# Patient Record
Sex: FEMALE | Race: WHITE | NOT HISPANIC OR LATINO | Employment: OTHER | URBAN - METROPOLITAN AREA
[De-identification: names, ages, dates, MRNs, and addresses within clinical notes are randomized per-mention and may not be internally consistent; named-entity substitution may affect disease eponyms.]

---

## 2017-01-30 RX ORDER — FENOFIBRATE 130 MG/1
CAPSULE ORAL
Qty: 90 CAPSULE | Refills: 1 | Status: SHIPPED | OUTPATIENT
Start: 2017-01-30 | End: 2017-04-12 | Stop reason: SDUPTHER

## 2017-01-30 RX ORDER — VILAZODONE HYDROCHLORIDE 40 MG/1
TABLET ORAL
Qty: 90 TABLET | Refills: 1 | Status: SHIPPED | OUTPATIENT
Start: 2017-01-30 | End: 2017-04-12 | Stop reason: SDUPTHER

## 2017-01-30 RX ORDER — LEVOTHYROXINE SODIUM 25 MCG
TABLET ORAL
Qty: 90 TABLET | Refills: 1 | Status: SHIPPED | OUTPATIENT
Start: 2017-01-30 | End: 2017-04-12 | Stop reason: SDUPTHER

## 2017-01-30 RX ORDER — ESOMEPRAZOLE MAGNESIUM 40 MG/1
CAPSULE, DELAYED RELEASE ORAL
Qty: 90 CAPSULE | Refills: 1 | Status: SHIPPED | OUTPATIENT
Start: 2017-01-30 | End: 2017-04-12 | Stop reason: SDUPTHER

## 2017-01-30 RX ORDER — ROSUVASTATIN CALCIUM 20 MG/1
TABLET, COATED ORAL
Qty: 90 TABLET | Refills: 1 | Status: SHIPPED | OUTPATIENT
Start: 2017-01-30 | End: 2017-04-12 | Stop reason: SDUPTHER

## 2017-02-20 ENCOUNTER — RESULTS ENCOUNTER (OUTPATIENT)
Dept: INTERNAL MEDICINE | Facility: CLINIC | Age: 67
End: 2017-02-20

## 2017-02-20 DIAGNOSIS — E78.5 HYPERLIPIDEMIA: ICD-10-CM

## 2017-02-20 DIAGNOSIS — I10 ESSENTIAL HYPERTENSION: ICD-10-CM

## 2017-02-21 LAB
ALBUMIN SERPL-MCNC: 5 G/DL (ref 3.5–5.2)
ALBUMIN/GLOB SERPL: 2.5 G/DL
ALP SERPL-CCNC: 57 U/L (ref 39–117)
ALT SERPL-CCNC: 36 U/L (ref 1–33)
AST SERPL-CCNC: 29 U/L (ref 1–32)
BILIRUB SERPL-MCNC: 0.2 MG/DL (ref 0.1–1.2)
BUN SERPL-MCNC: 22 MG/DL (ref 8–23)
BUN/CREAT SERPL: 18.5 (ref 7–25)
CALCIUM SERPL-MCNC: 9.5 MG/DL (ref 8.6–10.5)
CHLORIDE SERPL-SCNC: 105 MMOL/L (ref 98–107)
CHOLEST SERPL-MCNC: 232 MG/DL (ref 0–200)
CO2 SERPL-SCNC: 21.9 MMOL/L (ref 22–29)
CREAT SERPL-MCNC: 1.19 MG/DL (ref 0.57–1)
GLOBULIN SER CALC-MCNC: 2 GM/DL
GLUCOSE SERPL-MCNC: 157 MG/DL (ref 65–99)
HDLC SERPL-MCNC: 40 MG/DL (ref 40–60)
LDLC SERPL CALC-MCNC: 129 MG/DL (ref 0–100)
LDLC/HDLC SERPL: 3.23 {RATIO}
POTASSIUM SERPL-SCNC: 4.8 MMOL/L (ref 3.5–5.2)
PROT SERPL-MCNC: 7 G/DL (ref 6–8.5)
SODIUM SERPL-SCNC: 144 MMOL/L (ref 136–145)
TRIGL SERPL-MCNC: 315 MG/DL (ref 0–150)
TSH SERPL DL<=0.005 MIU/L-ACNC: 3.27 MIU/ML (ref 0.27–4.2)
VLDLC SERPL CALC-MCNC: 63 MG/DL (ref 5–40)

## 2017-02-23 ENCOUNTER — OFFICE VISIT (OUTPATIENT)
Dept: INTERNAL MEDICINE | Facility: CLINIC | Age: 67
End: 2017-02-23

## 2017-02-23 VITALS
DIASTOLIC BLOOD PRESSURE: 88 MMHG | SYSTOLIC BLOOD PRESSURE: 128 MMHG | WEIGHT: 207.5 LBS | BODY MASS INDEX: 34.57 KG/M2 | OXYGEN SATURATION: 96 % | HEART RATE: 73 BPM | HEIGHT: 65 IN

## 2017-02-23 DIAGNOSIS — M54.5 LOW BACK PAIN, UNSPECIFIED BACK PAIN LATERALITY, UNSPECIFIED CHRONICITY, WITH SCIATICA PRESENCE UNSPECIFIED: ICD-10-CM

## 2017-02-23 DIAGNOSIS — I10 ESSENTIAL HYPERTENSION: ICD-10-CM

## 2017-02-23 DIAGNOSIS — E03.9 ACQUIRED HYPOTHYROIDISM: Primary | ICD-10-CM

## 2017-02-23 DIAGNOSIS — E78.5 HYPERLIPIDEMIA, UNSPECIFIED HYPERLIPIDEMIA TYPE: ICD-10-CM

## 2017-02-23 LAB
BILIRUB BLD-MCNC: NEGATIVE MG/DL
CLARITY, POC: CLEAR
COLOR UR: YELLOW
GLUCOSE UR STRIP-MCNC: NEGATIVE MG/DL
KETONES UR QL: NEGATIVE
LEUKOCYTE EST, POC: NEGATIVE
NITRITE UR-MCNC: NEGATIVE MG/ML
PH UR: 5 [PH] (ref 5–8)
PROT UR STRIP-MCNC: NEGATIVE MG/DL
RBC # UR STRIP: ABNORMAL /UL
SP GR UR: 1.03 (ref 1–1.03)
UROBILINOGEN UR QL: NORMAL

## 2017-02-23 PROCEDURE — 99214 OFFICE O/P EST MOD 30 MIN: CPT | Performed by: INTERNAL MEDICINE

## 2017-02-23 PROCEDURE — 81003 URINALYSIS AUTO W/O SCOPE: CPT | Performed by: INTERNAL MEDICINE

## 2017-02-23 RX ORDER — ESTRADIOL 10 UG/1
1 TABLET VAGINAL 2 TIMES WEEKLY
Refills: 12 | COMMUNITY
Start: 2016-12-23 | End: 2017-04-12 | Stop reason: SDUPTHER

## 2017-02-23 RX ORDER — CLINDAMYCIN PHOSPHATE 10 UG/ML
1 LOTION TOPICAL 2 TIMES DAILY
Refills: 1 | COMMUNITY
Start: 2016-12-23 | End: 2017-04-12 | Stop reason: SDUPTHER

## 2017-02-23 RX ORDER — MELOXICAM 7.5 MG/1
7.5 TABLET ORAL DAILY
Qty: 30 TABLET | Refills: 2 | Status: SHIPPED | OUTPATIENT
Start: 2017-02-23 | End: 2017-10-19 | Stop reason: SDUPTHER

## 2017-02-23 RX ORDER — LISINOPRIL 10 MG/1
10 TABLET ORAL DAILY
Qty: 30 TABLET | Refills: 2 | Status: SHIPPED | OUTPATIENT
Start: 2017-02-23 | End: 2017-03-10 | Stop reason: SDUPTHER

## 2017-02-23 NOTE — PROGRESS NOTES
Subjective   Dian Jefferson is a 67 y.o. female here today to f/u on HTN, hyperlipidemia and hypothyroidism.  Pt c/o low back pain.     History of Present Illness   Pt has been doing well with thyroid meds.  Taking as perscribed without any complications  Pt has been taking BP meds as prescribed without any problems.  No HA  No episodes of orthostasis.  She needs to change meds due to cost  She has not been on anything else  Pt has been taking cholesterol meds as prescribed.  No difficulties with myalgias.   She has been LBP. No radicular sx.  On the right side.  Better lying down.  Aleve gave some relief  No dysuria or hematuria    The following portions of the patient's history were reviewed and updated as appropriate: allergies, current medications, past medical history, past social history and problem list.  No sig change in diet  She tries to eat a low fat diet and she does not exercise reg    Review of Systems   Musculoskeletal: Positive for back pain.   All other systems reviewed and are negative.      Objective   Physical Exam   Constitutional: She is oriented to person, place, and time. She appears well-developed and well-nourished.   HENT:   Head: Normocephalic and atraumatic.   Right Ear: External ear normal.   Left Ear: External ear normal.   Mouth/Throat: Oropharynx is clear and moist.   Eyes: Conjunctivae and EOM are normal. Pupils are equal, round, and reactive to light.   Neck: Normal range of motion. No tracheal deviation present. No thyromegaly present.   Cardiovascular: Normal rate, regular rhythm, normal heart sounds and intact distal pulses.    Pulmonary/Chest: Effort normal and breath sounds normal.   Abdominal: Soft. Bowel sounds are normal. She exhibits no distension. There is no tenderness.   Musculoskeletal: Normal range of motion. She exhibits no edema or deformity.   Neurological: She is alert and oriented to person, place, and time.   Skin: Skin is warm and dry.   Psychiatric: She  has a normal mood and affect. Her behavior is normal. Judgment and thought content normal.   Vitals reviewed.      Vitals:    02/23/17 1002   BP: 128/88   Pulse: 73   SpO2: 96%        Results Encounter on 02/20/2017   Component Date Value Ref Range Status   • Glucose 02/21/2017 157* 65 - 99 mg/dL Final   • BUN 02/21/2017 22  8 - 23 mg/dL Final   • Creatinine 02/21/2017 1.19* 0.57 - 1.00 mg/dL Final   • eGFR Non  Am 02/21/2017 46* >60 mL/min/1.73 Final   • eGFR African Am 02/21/2017 55* >60 mL/min/1.73 Final   • BUN/Creatinine Ratio 02/21/2017 18.5  7.0 - 25.0 Final   • Sodium 02/21/2017 144  136 - 145 mmol/L Final   • Potassium 02/21/2017 4.8  3.5 - 5.2 mmol/L Final   • Chloride 02/21/2017 105  98 - 107 mmol/L Final   • Total CO2 02/21/2017 21.9* 22.0 - 29.0 mmol/L Final   • Calcium 02/21/2017 9.5  8.6 - 10.5 mg/dL Final   • Total Protein 02/21/2017 7.0  6.0 - 8.5 g/dL Final   • Albumin 02/21/2017 5.00  3.50 - 5.20 g/dL Final   • Globulin 02/21/2017 2.0  gm/dL Final   • A/G Ratio 02/21/2017 2.5  g/dL Final   • Total Bilirubin 02/21/2017 0.2  0.1 - 1.2 mg/dL Final   • Alkaline Phosphatase 02/21/2017 57  39 - 117 U/L Final   • AST (SGOT) 02/21/2017 29  1 - 32 U/L Final   • ALT (SGPT) 02/21/2017 36* 1 - 33 U/L Final   • Total Cholesterol 02/21/2017 232* 0 - 200 mg/dL Final   • Triglycerides 02/21/2017 315* 0 - 150 mg/dL Final   • HDL Cholesterol 02/21/2017 40  40 - 60 mg/dL Final   • VLDL Cholesterol 02/21/2017 63* 5 - 40 mg/dL Final   • LDL Cholesterol  02/21/2017 129* 0 - 100 mg/dL Final   • LDL/HDL Ratio 02/21/2017 3.23   Final   • TSH 02/21/2017 3.27  0.27 - 4.2 mIU/mL Final       Current Outpatient Prescriptions:   •  aspirin 81 MG tablet, Take 1 tablet by mouth daily., Disp: , Rfl:   •  cetirizine (ZyrTEC) 10 MG tablet, Take 1 tablet by mouth daily., Disp: , Rfl:   •  Cholecalciferol (VITAMIN D3) 2000 UNITS capsule, Take 1 capsule by mouth daily., Disp: , Rfl:   •  clindamycin (CLEOCIN T) 1 % lotion, Apply  1 application topically 2 (Two) Times a Day., Disp: , Rfl: 1  •  esomeprazole (nexIUM) 40 MG capsule, Take 1 capsule by mouth  daily, Disp: 90 capsule, Rfl: 1  •  fenofibrate micronized (ANTARA) 130 MG capsule, Take 1 capsule by mouth  daily, Disp: 90 capsule, Rfl: 1  •  nebivolol (BYSTOLIC) 10 MG tablet, Take 1 tablet by mouth daily., Disp: 90 tablet, Rfl: 3  •  rosuvastatin (CRESTOR) 20 MG tablet, Take 1 tablet by mouth  daily, Disp: 90 tablet, Rfl: 1  •  SYNTHROID 25 MCG tablet, Take 1 tablet by mouth  daily, Disp: 90 tablet, Rfl: 1  •  VIIBRYD 40 MG tablet tablet, Take 1 tablet by mouth  daily, Disp: 90 tablet, Rfl: 1  •  YUVAFEM 10 MCG tablet vaginal tablet, Insert 1 tablet into the vagina 2 (Two) Times a Week., Disp: , Rfl: 12    UA- neg    Assessment/Plan   Diagnoses and all orders for this visit:    Acquired hypothyroidism    Essential hypertension    Hyperlipidemia, unspecified hyperlipidemia type    Low back pain, unspecified back pain laterality, unspecified chronicity, with sciatica presence unspecified        1.  HTN: Blood pressure relatively well controlled but she needs to change meds due to cost  WE will wean bystolic and add ace 10mg daily  2.  Hypothyroidism: Patient is doing well with current dose of Synthroid.  We will continue this  3.  Hyperlipidemia: no change in meds or diet  Recheck in 6 week  4.  Low back pain-She is going to do PT and try meloxicam daily with prn tylenol

## 2017-03-10 RX ORDER — LISINOPRIL 10 MG/1
10 TABLET ORAL DAILY
Qty: 90 TABLET | Refills: 1 | Status: SHIPPED | OUTPATIENT
Start: 2017-03-10 | End: 2017-04-12 | Stop reason: SDUPTHER

## 2017-03-27 DIAGNOSIS — E78.5 HYPERLIPIDEMIA, UNSPECIFIED HYPERLIPIDEMIA TYPE: ICD-10-CM

## 2017-03-27 DIAGNOSIS — I10 ESSENTIAL HYPERTENSION: ICD-10-CM

## 2017-04-07 ENCOUNTER — APPOINTMENT (OUTPATIENT)
Dept: WOMENS IMAGING | Facility: HOSPITAL | Age: 67
End: 2017-04-07

## 2017-04-07 PROCEDURE — 77067 SCR MAMMO BI INCL CAD: CPT | Performed by: RADIOLOGY

## 2017-04-07 PROCEDURE — 77063 BREAST TOMOSYNTHESIS BI: CPT | Performed by: RADIOLOGY

## 2017-04-07 PROCEDURE — G0202 SCR MAMMO BI INCL CAD: HCPCS | Performed by: RADIOLOGY

## 2017-04-10 LAB
ALBUMIN SERPL-MCNC: 4.6 G/DL (ref 3.5–5.2)
ALBUMIN/GLOB SERPL: 1.9 G/DL
ALP SERPL-CCNC: 50 U/L (ref 39–117)
ALT SERPL-CCNC: 39 U/L (ref 1–33)
AST SERPL-CCNC: 34 U/L (ref 1–32)
BILIRUB SERPL-MCNC: 0.2 MG/DL (ref 0.1–1.2)
BUN SERPL-MCNC: 17 MG/DL (ref 8–23)
BUN/CREAT SERPL: 15.5 (ref 7–25)
CALCIUM SERPL-MCNC: 10.1 MG/DL (ref 8.6–10.5)
CHLORIDE SERPL-SCNC: 106 MMOL/L (ref 98–107)
CHOLEST SERPL-MCNC: 229 MG/DL (ref 0–200)
CO2 SERPL-SCNC: 21 MMOL/L (ref 22–29)
CREAT SERPL-MCNC: 1.1 MG/DL (ref 0.57–1)
GLOBULIN SER CALC-MCNC: 2.4 GM/DL
GLUCOSE SERPL-MCNC: 113 MG/DL (ref 65–99)
HDLC SERPL-MCNC: 48 MG/DL (ref 40–60)
LDLC SERPL CALC-MCNC: 147 MG/DL (ref 0–100)
LDLC/HDLC SERPL: 3.07 {RATIO}
POTASSIUM SERPL-SCNC: 4.7 MMOL/L (ref 3.5–5.2)
PROT SERPL-MCNC: 7 G/DL (ref 6–8.5)
SODIUM SERPL-SCNC: 143 MMOL/L (ref 136–145)
TRIGL SERPL-MCNC: 168 MG/DL (ref 0–150)
VLDLC SERPL CALC-MCNC: 33.6 MG/DL (ref 5–40)

## 2017-04-12 ENCOUNTER — OFFICE VISIT (OUTPATIENT)
Dept: INTERNAL MEDICINE | Facility: CLINIC | Age: 67
End: 2017-04-12

## 2017-04-12 VITALS
OXYGEN SATURATION: 98 % | HEIGHT: 65 IN | BODY MASS INDEX: 34.54 KG/M2 | HEART RATE: 92 BPM | SYSTOLIC BLOOD PRESSURE: 148 MMHG | WEIGHT: 207.3 LBS | DIASTOLIC BLOOD PRESSURE: 86 MMHG

## 2017-04-12 DIAGNOSIS — I10 ESSENTIAL HYPERTENSION: Primary | ICD-10-CM

## 2017-04-12 DIAGNOSIS — E78.5 HYPERLIPIDEMIA, UNSPECIFIED HYPERLIPIDEMIA TYPE: ICD-10-CM

## 2017-04-12 DIAGNOSIS — K21.9 GASTROESOPHAGEAL REFLUX DISEASE, ESOPHAGITIS PRESENCE NOT SPECIFIED: ICD-10-CM

## 2017-04-12 DIAGNOSIS — Z12.11 SCREENING FOR COLON CANCER: ICD-10-CM

## 2017-04-12 DIAGNOSIS — E78.00 ELEVATED CHOLESTEROL: ICD-10-CM

## 2017-04-12 PROCEDURE — 99214 OFFICE O/P EST MOD 30 MIN: CPT | Performed by: INTERNAL MEDICINE

## 2017-04-12 RX ORDER — ESOMEPRAZOLE MAGNESIUM 40 MG/1
40 CAPSULE, DELAYED RELEASE ORAL
Qty: 90 CAPSULE | Refills: 3 | Status: SHIPPED | OUTPATIENT
Start: 2017-04-12 | End: 2017-09-11 | Stop reason: SDUPTHER

## 2017-04-12 RX ORDER — ROSUVASTATIN CALCIUM 20 MG/1
20 TABLET, COATED ORAL NIGHTLY
Qty: 90 TABLET | Refills: 1 | Status: SHIPPED | OUTPATIENT
Start: 2017-04-12 | End: 2017-07-13 | Stop reason: SDUPTHER

## 2017-04-12 RX ORDER — LEVOTHYROXINE SODIUM 25 MCG
25 TABLET ORAL DAILY
Qty: 90 TABLET | Refills: 1 | Status: SHIPPED | OUTPATIENT
Start: 2017-04-12 | End: 2017-09-11 | Stop reason: SDUPTHER

## 2017-04-12 RX ORDER — FENOFIBRATE 130 MG/1
130 CAPSULE ORAL
Qty: 90 CAPSULE | Refills: 3 | Status: SHIPPED | OUTPATIENT
Start: 2017-04-12 | End: 2017-09-11 | Stop reason: SDUPTHER

## 2017-04-12 RX ORDER — ESTRADIOL 10 UG/1
1 TABLET VAGINAL 2 TIMES WEEKLY
Qty: 24 TABLET | Refills: 1 | Status: SHIPPED | OUTPATIENT
Start: 2017-04-13 | End: 2019-01-22 | Stop reason: SDUPTHER

## 2017-04-12 RX ORDER — LISINOPRIL 20 MG/1
20 TABLET ORAL DAILY
Qty: 90 TABLET | Refills: 1 | Status: SHIPPED | OUTPATIENT
Start: 2017-04-12 | End: 2017-07-29 | Stop reason: SDUPTHER

## 2017-04-12 RX ORDER — VILAZODONE HYDROCHLORIDE 40 MG/1
40 TABLET ORAL DAILY
Qty: 90 TABLET | Refills: 1 | Status: SHIPPED | OUTPATIENT
Start: 2017-04-12 | End: 2018-08-13 | Stop reason: SDUPTHER

## 2017-04-12 RX ORDER — CLINDAMYCIN PHOSPHATE 10 UG/ML
1 LOTION TOPICAL 2 TIMES DAILY
Qty: 60 ML | Refills: 1 | Status: SHIPPED | OUTPATIENT
Start: 2017-04-12 | End: 2019-09-26 | Stop reason: ALTCHOICE

## 2017-04-12 NOTE — PROGRESS NOTES
Subjective   Dian Jefferson is a 67 y.o. female here today to f/u on HTN and hyperlipidemia.      History of Present Illness   Pt has been taking BP meds as prescribed without any problems.  No HA  No episodes of orthostasis  Pt has been taking cholesterol meds as prescribed.  No difficulties with myalgias.   Pt has been compliant with meds for GERD.  No sx as long as pt takes medicine as prescribed.  No epigastric pain or reflux sx  SHe has been under stress with work and not eating very well lately    The following portions of the patient's history were reviewed and updated as appropriate: allergies, current medications, past medical history, past social history and problem list.  She is retiring in 12 days and plans to start walking more and eating a healthier diet    Review of Systems   All other systems reviewed and are negative.      Objective   Physical Exam   Constitutional: She is oriented to person, place, and time. She appears well-developed and well-nourished.   HENT:   Head: Normocephalic and atraumatic.   Right Ear: External ear normal.   Left Ear: External ear normal.   Mouth/Throat: Oropharynx is clear and moist.   Eyes: Conjunctivae and EOM are normal. Pupils are equal, round, and reactive to light.   Neck: Normal range of motion. No tracheal deviation present. No thyromegaly present.   Cardiovascular: Normal rate, regular rhythm, normal heart sounds and intact distal pulses.    Pulmonary/Chest: Effort normal and breath sounds normal.   Musculoskeletal: Normal range of motion. She exhibits no edema or deformity.   Neurological: She is alert and oriented to person, place, and time.   Skin: Skin is warm and dry.   Psychiatric: She has a normal mood and affect. Her behavior is normal. Judgment and thought content normal.   Vitals reviewed.      Vitals:    04/12/17 1031   BP: 148/86   Pulse: 92   SpO2: 98%        Orders Only on 03/27/2017   Component Date Value Ref Range Status   • Glucose  04/10/2017 113* 65 - 99 mg/dL Final   • BUN 04/10/2017 17  8 - 23 mg/dL Final   • Creatinine 04/10/2017 1.10* 0.57 - 1.00 mg/dL Final   • eGFR Non African Am 04/10/2017 50* >60 mL/min/1.73 Final   • eGFR African Am 04/10/2017 60* >60 mL/min/1.73 Final   • BUN/Creatinine Ratio 04/10/2017 15.5  7.0 - 25.0 Final   • Sodium 04/10/2017 143  136 - 145 mmol/L Final   • Potassium 04/10/2017 4.7  3.5 - 5.2 mmol/L Final   • Chloride 04/10/2017 106  98 - 107 mmol/L Final   • Total CO2 04/10/2017 21.0* 22.0 - 29.0 mmol/L Final   • Calcium 04/10/2017 10.1  8.6 - 10.5 mg/dL Final   • Total Protein 04/10/2017 7.0  6.0 - 8.5 g/dL Final   • Albumin 04/10/2017 4.60  3.50 - 5.20 g/dL Final   • Globulin 04/10/2017 2.4  gm/dL Final   • A/G Ratio 04/10/2017 1.9  g/dL Final   • Total Bilirubin 04/10/2017 0.2  0.1 - 1.2 mg/dL Final   • Alkaline Phosphatase 04/10/2017 50  39 - 117 U/L Final   • AST (SGOT) 04/10/2017 34* 1 - 32 U/L Final   • ALT (SGPT) 04/10/2017 39* 1 - 33 U/L Final   • Total Cholesterol 04/10/2017 229* 0 - 200 mg/dL Final   • Triglycerides 04/10/2017 168* 0 - 150 mg/dL Final   • HDL Cholesterol 04/10/2017 48  40 - 60 mg/dL Final   • VLDL Cholesterol 04/10/2017 33.6  5 - 40 mg/dL Final   • LDL Cholesterol  04/10/2017 147* 0 - 100 mg/dL Final   • LDL/HDL Ratio 04/10/2017 3.07   Final       Current Outpatient Prescriptions:   •  aspirin 81 MG tablet, Take 1 tablet by mouth daily., Disp: , Rfl:   •  cetirizine (ZyrTEC) 10 MG tablet, Take 1 tablet by mouth daily., Disp: , Rfl:   •  Cholecalciferol (VITAMIN D3) 2000 UNITS capsule, Take 1 capsule by mouth daily., Disp: , Rfl:   •  clindamycin (CLEOCIN T) 1 % lotion, Apply 1 application topically 2 (Two) Times a Day., Disp: 60 mL, Rfl: 1  •  esomeprazole (nexIUM) 40 MG capsule, Take 1 capsule by mouth Every Morning Before Breakfast., Disp: 90 capsule, Rfl: 3  •  fenofibrate micronized (ANTARA) 130 MG capsule, Take 1 capsule by mouth Every Morning Before Breakfast., Disp: 90  capsule, Rfl: 3  •  lisinopril (PRINIVIL,ZESTRIL) 20 MG tablet, Take 1 tablet by mouth Daily., Disp: 90 tablet, Rfl: 1  •  meloxicam (MOBIC) 7.5 MG tablet, Take 1 tablet by mouth Daily. Take with food, Disp: 30 tablet, Rfl: 2  •  rosuvastatin (CRESTOR) 20 MG tablet, Take 1 tablet by mouth Every Night., Disp: 90 tablet, Rfl: 1  •  SYNTHROID 25 MCG tablet, Take 1 tablet by mouth Daily., Disp: 90 tablet, Rfl: 1  •  vilazodone (VIIBRYD) 40 MG tablet tablet, Take 1 tablet by mouth Daily., Disp: 90 tablet, Rfl: 1  •  [START ON 4/13/2017] YUVAFEM 10 MCG tablet vaginal tablet, Insert 1 tablet into the vagina 2 (Two) Times a Week., Disp: 24 tablet, Rfl: 1      Assessment/Plan   Diagnoses and all orders for this visit:    Essential hypertension  -     Comprehensive Metabolic Panel; Future  -     Hemoglobin A1c; Future  -     LP+LDL / HDL Ratio ([facility]); Future  -     TSH Rfx On Abnormal To Free T4; Future  -     Hepatitis C Antibody; Future    Hyperlipidemia, unspecified hyperlipidemia type  -     Comprehensive Metabolic Panel; Future  -     Hemoglobin A1c; Future  -     LP+LDL / HDL Ratio ([facility]); Future  -     TSH Rfx On Abnormal To Free T4; Future    Gastroesophageal reflux disease, esophagitis presence not specified    Elevated cholesterol  -     Hemoglobin A1c; Future    Screening for colon cancer  -     Ambulatory Referral For Screening Colonoscopy    Other orders  -     rosuvastatin (CRESTOR) 20 MG tablet; Take 1 tablet by mouth Every Night.  -     fenofibrate micronized (ANTARA) 130 MG capsule; Take 1 capsule by mouth Every Morning Before Breakfast.  -     lisinopril (PRINIVIL,ZESTRIL) 20 MG tablet; Take 1 tablet by mouth Daily.  -     esomeprazole (nexIUM) 40 MG capsule; Take 1 capsule by mouth Every Morning Before Breakfast.  -     clindamycin (CLEOCIN T) 1 % lotion; Apply 1 application topically 2 (Two) Times a Day.  -     SYNTHROID 25 MCG tablet; Take 1 tablet by mouth Daily.  -     vilazodone (VIIBRYD) 40 MG  tablet tablet; Take 1 tablet by mouth Daily.  -     YUVAFEM 10 MCG tablet vaginal tablet; Insert 1 tablet into the vagina 2 (Two) Times a Week.    1. HTN- Increase to 20 mg daily  2. HPL- elevated- discussed increasing meds but pt wants to work on diet and exercise  3. Elevated glucose- pt is going to work hard on diet and exercise and we will rechek in 3 months  4. GERD- ok with nexium

## 2017-04-13 ENCOUNTER — TELEPHONE (OUTPATIENT)
Dept: GASTROENTEROLOGY | Facility: CLINIC | Age: 67
End: 2017-04-13

## 2017-04-13 NOTE — TELEPHONE ENCOUNTER
Reviewed - okay to schedule.  Does she have a family history of colon cancer and colon polyps or only colon polyps.  Please confirm.

## 2017-04-24 ENCOUNTER — PREP FOR SURGERY (OUTPATIENT)
Dept: SURGERY | Facility: CLINIC | Age: 67
End: 2017-04-24

## 2017-04-24 DIAGNOSIS — Z83.71 FAMILY HISTORY OF POLYPS IN THE COLON: ICD-10-CM

## 2017-04-24 DIAGNOSIS — Z12.11 ENCOUNTER FOR SCREENING COLONOSCOPY: Primary | ICD-10-CM

## 2017-05-22 ENCOUNTER — OUTSIDE FACILITY SERVICE (OUTPATIENT)
Dept: SURGERY | Facility: CLINIC | Age: 67
End: 2017-05-22

## 2017-05-22 PROCEDURE — 45380 COLONOSCOPY AND BIOPSY: CPT | Performed by: SURGERY

## 2017-05-22 PROCEDURE — 88305 TISSUE EXAM BY PATHOLOGIST: CPT | Performed by: SURGERY

## 2017-05-23 ENCOUNTER — LAB REQUISITION (OUTPATIENT)
Dept: LAB | Facility: HOSPITAL | Age: 67
End: 2017-05-23

## 2017-05-23 DIAGNOSIS — Z12.11 ENCOUNTER FOR SCREENING FOR MALIGNANT NEOPLASM OF COLON: ICD-10-CM

## 2017-05-24 LAB
CYTO UR: NORMAL
LAB AP CASE REPORT: NORMAL
LAB AP CLINICAL INFORMATION: NORMAL
Lab: NORMAL
PATH REPORT.FINAL DX SPEC: NORMAL
PATH REPORT.GROSS SPEC: NORMAL

## 2017-06-09 PROBLEM — K64.8 INTERNAL HEMORRHOIDS: Status: ACTIVE | Noted: 2017-06-09

## 2017-06-09 PROBLEM — D12.6 TUBULAR ADENOMA OF COLON: Status: ACTIVE | Noted: 2017-06-09

## 2017-06-09 PROBLEM — K57.30 SIGMOID DIVERTICULOSIS: Status: ACTIVE | Noted: 2017-06-09

## 2017-07-12 ENCOUNTER — RESULTS ENCOUNTER (OUTPATIENT)
Dept: INTERNAL MEDICINE | Facility: CLINIC | Age: 67
End: 2017-07-12

## 2017-07-12 DIAGNOSIS — E78.00 ELEVATED CHOLESTEROL: ICD-10-CM

## 2017-07-12 DIAGNOSIS — E78.5 HYPERLIPIDEMIA, UNSPECIFIED HYPERLIPIDEMIA TYPE: ICD-10-CM

## 2017-07-12 DIAGNOSIS — I10 ESSENTIAL HYPERTENSION: ICD-10-CM

## 2017-07-12 LAB
ALBUMIN SERPL-MCNC: 5 G/DL (ref 3.5–5.2)
ALBUMIN/GLOB SERPL: 2.2 G/DL
ALP SERPL-CCNC: 51 U/L (ref 39–117)
ALT SERPL-CCNC: 33 U/L (ref 1–33)
AST SERPL-CCNC: 29 U/L (ref 1–32)
BILIRUB SERPL-MCNC: 0.3 MG/DL (ref 0.1–1.2)
BUN SERPL-MCNC: 18 MG/DL (ref 8–23)
BUN/CREAT SERPL: 15.3 (ref 7–25)
CALCIUM SERPL-MCNC: 9.9 MG/DL (ref 8.6–10.5)
CHLORIDE SERPL-SCNC: 106 MMOL/L (ref 98–107)
CHOLEST SERPL-MCNC: 226 MG/DL (ref 0–200)
CO2 SERPL-SCNC: 22.9 MMOL/L (ref 22–29)
CREAT SERPL-MCNC: 1.18 MG/DL (ref 0.57–1)
GLOBULIN SER CALC-MCNC: 2.3 GM/DL
GLUCOSE SERPL-MCNC: 105 MG/DL (ref 65–99)
HBA1C MFR BLD: 6.01 % (ref 4.8–5.6)
HCV AB S/CO SERPL IA: 0.1 S/CO RATIO (ref 0–0.9)
HDLC SERPL-MCNC: 47 MG/DL (ref 40–60)
LDLC SERPL CALC-MCNC: 152 MG/DL (ref 0–100)
LDLC/HDLC SERPL: 3.23 {RATIO}
POTASSIUM SERPL-SCNC: 5.1 MMOL/L (ref 3.5–5.2)
PROT SERPL-MCNC: 7.3 G/DL (ref 6–8.5)
SODIUM SERPL-SCNC: 145 MMOL/L (ref 136–145)
TRIGL SERPL-MCNC: 136 MG/DL (ref 0–150)
TSH SERPL DL<=0.005 MIU/L-ACNC: 2.08 MIU/ML (ref 0.27–4.2)
VLDLC SERPL CALC-MCNC: 27.2 MG/DL (ref 5–40)

## 2017-07-13 ENCOUNTER — OFFICE VISIT (OUTPATIENT)
Dept: INTERNAL MEDICINE | Facility: CLINIC | Age: 67
End: 2017-07-13

## 2017-07-13 VITALS
SYSTOLIC BLOOD PRESSURE: 126 MMHG | HEART RATE: 78 BPM | DIASTOLIC BLOOD PRESSURE: 84 MMHG | WEIGHT: 203.3 LBS | BODY MASS INDEX: 33.87 KG/M2 | HEIGHT: 65 IN | OXYGEN SATURATION: 96 %

## 2017-07-13 DIAGNOSIS — I10 ESSENTIAL HYPERTENSION: ICD-10-CM

## 2017-07-13 DIAGNOSIS — E03.9 ACQUIRED HYPOTHYROIDISM: ICD-10-CM

## 2017-07-13 DIAGNOSIS — K21.9 GASTROESOPHAGEAL REFLUX DISEASE, ESOPHAGITIS PRESENCE NOT SPECIFIED: ICD-10-CM

## 2017-07-13 DIAGNOSIS — E78.5 HYPERLIPIDEMIA, UNSPECIFIED HYPERLIPIDEMIA TYPE: Primary | ICD-10-CM

## 2017-07-13 DIAGNOSIS — Z00.00 WELCOME TO MEDICARE PREVENTIVE VISIT: ICD-10-CM

## 2017-07-13 PROCEDURE — G0402 INITIAL PREVENTIVE EXAM: HCPCS | Performed by: INTERNAL MEDICINE

## 2017-07-13 PROCEDURE — 99213 OFFICE O/P EST LOW 20 MIN: CPT | Performed by: INTERNAL MEDICINE

## 2017-07-13 RX ORDER — ROSUVASTATIN CALCIUM 40 MG/1
40 TABLET, COATED ORAL NIGHTLY
Qty: 30 TABLET | Refills: 3 | Status: SHIPPED | OUTPATIENT
Start: 2017-07-13 | End: 2017-09-11 | Stop reason: SDUPTHER

## 2017-07-13 NOTE — PROGRESS NOTES
QUICK REFERENCE INFORMATION:  The ABCs of the Annual Wellness Visit    [facility]    HEALTH RISK ASSESSMENT    1950    Recent Hospitalizations:  No hospitalization(s) within the last year..      Current Medical Providers:  Patient Care Team:  Mallika Stewart MD as PCP - General (Internal Medicine)  Britney Cole MD as Consulting Physician (Obstetrics and Gynecology)      Smoking Status:  History   Smoking Status   • Never Smoker   Smokeless Tobacco   • Never Used       Alcohol Consumption:  History   Alcohol Use   • Yes     Comment: OCC. USE       Depression Screen:   PHQ-9 Depression Screening 7/13/2017   Little interest or pleasure in doing things 0   Feeling down, depressed, or hopeless 0   Trouble falling or staying asleep, or sleeping too much 0   Feeling tired or having little energy 0   Poor appetite or overeating 0   Feeling bad about yourself - or that you are a failure or have let yourself or your family down 0   Trouble concentrating on things, such as reading the newspaper or watching television 0   Moving or speaking so slowly that other people could have noticed. Or the opposite - being so fidgety or restless that you have been moving around a lot more than usual 0   Thoughts that you would be better off dead, or of hurting yourself in some way 0   PHQ-9 Total Score 0       Health Habits and Functional and Cognitive Screening:  Functional & Cognitive Status 7/13/2017   Do you have difficulty preparing food and eating? No   Do you have difficulty bathing yourself? No   Do you have difficulty getting dressed? No   Do you have difficulty using the toilet? No   Do you have difficulty moving around from place to place? No   In the past year have you fallen or experienced a near fall? No   Do you need help using the phone?  No   Are you deaf or do you have serious difficulty hearing?  No   Do you need help with transportation? No   Do you need help shopping? No   Do you need help preparing  meals?  No   Do you need help with housework?  No   Do you need help with laundry? No   Do you need help taking your medications? No   Do you need help managing money? No   Do you have difficulty concentrating, remembering or making decisions? No                Does the patient have evidence of cognitive impairment? No    Aspirin use counseling? Does not need AS but is currently taking (discussed benefits vs risks and patient elects to stay on ASA)      Recent Lab Results:  CMP:  Lab Results   Component Value Date     (H) 07/11/2017    BUN 18 07/11/2017    CREATININE 1.18 (H) 07/11/2017    EGFRIFNONA 46 (L) 07/11/2017    EGFRIFAFRI 56 (L) 07/11/2017    BCR 15.3 07/11/2017     07/11/2017    K 5.1 07/11/2017    CO2 22.9 07/11/2017    CALCIUM 9.9 07/11/2017    PROTENTOTREF 7.3 07/11/2017    ALBUMIN 5.00 07/11/2017    LABGLOBREF 2.3 07/11/2017    LABIL2 2.2 07/11/2017    BILITOT 0.3 07/11/2017    ALKPHOS 51 07/11/2017    AST 29 07/11/2017    ALT 33 07/11/2017     Lipid Panel:  Lab Results   Component Value Date    TRIG 136 07/11/2017    HDL 47 07/11/2017    VLDL 27.2 07/11/2017    LDLHDL 3.23 07/11/2017     HbA1c:  Lab Results   Component Value Date    HGBA1C 6.01 (H) 07/11/2017       Visual Acuity:   Visual Acuity Screening    Right eye Left eye Both eyes   Without correction:      With correction: 20/20 20/5020/25    Comments: PT WEARS MONO VISION CONTACTS, LEFT EYE IS FOR READING      Age-appropriate Screening Schedule:  Refer to the list below for future screening recommendations based on patient's age, sex and/or medical conditions. Orders for these recommended tests are listed in the plan section. The patient has been provided with a written plan.    Health Maintenance   Topic Date Due   • INFLUENZA VACCINE  08/01/2017   • LIPID PANEL  07/11/2018   • PNEUMOCOCCAL VACCINES (65+ LOW/MEDIUM RISK) (2 of 2 - PPSV23) 07/13/2018   • PAP SMEAR  12/01/2018   • MAMMOGRAM  04/11/2019   • COLONOSCOPY  05/22/2020    • TDAP/TD VACCINES (2 - Td) 07/13/2027   • ZOSTER VACCINE  Addressed        Subjective   History of Present Illness    Dian Jefferson is a 67 y.o. female an established patient presenting for a Welcome to Medicare Visit.     The following portions of the patient's history were reviewed and updated as appropriate: allergies, current medications, past family history, past medical history, past social history, past surgical history and problem list.    Outpatient Medications Prior to Visit   Medication Sig Dispense Refill   • aspirin 81 MG tablet Take 1 tablet by mouth daily.     • cetirizine (ZyrTEC) 10 MG tablet Take 1 tablet by mouth daily.     • Cholecalciferol (VITAMIN D3) 2000 UNITS capsule Take 1 capsule by mouth daily.     • clindamycin (CLEOCIN T) 1 % lotion Apply 1 application topically 2 (Two) Times a Day. 60 mL 1   • esomeprazole (nexIUM) 40 MG capsule Take 1 capsule by mouth Every Morning Before Breakfast. 90 capsule 3   • fenofibrate micronized (ANTARA) 130 MG capsule Take 1 capsule by mouth Every Morning Before Breakfast. 90 capsule 3   • lisinopril (PRINIVIL,ZESTRIL) 20 MG tablet Take 1 tablet by mouth Daily. 90 tablet 1   • SYNTHROID 25 MCG tablet Take 1 tablet by mouth Daily. 90 tablet 1   • vilazodone (VIIBRYD) 40 MG tablet tablet Take 1 tablet by mouth Daily. 90 tablet 1   • YUVAFEM 10 MCG tablet vaginal tablet Insert 1 tablet into the vagina 2 (Two) Times a Week. 24 tablet 1   • rosuvastatin (CRESTOR) 20 MG tablet Take 1 tablet by mouth Every Night. 90 tablet 1   • meloxicam (MOBIC) 7.5 MG tablet Take 1 tablet by mouth Daily. Take with food 30 tablet 2     No facility-administered medications prior to visit.        Patient Active Problem List   Diagnosis   • Anxiety   • Gastroesophageal reflux disease   • Menopausal flushing   • Essential hypertension   • Hyperlipidemia   • Hypothyroidism   • Abnormal mammogram   • Muscle pain   • Breast implant rupture   • Vitamin D deficiency   • Tubular  "adenoma of colon   • Sigmoid diverticulosis   • Internal hemorrhoids       Advance Care Planning:  has NO advance directive - information provided to the patient today    Identification of Risk Factors:  Risk factors include: weight , unhealthy diet, cardiovascular risk, inactivity and increased fall risk.    Review of Systems    Compared to one year ago, the patient feels her physical health is the same.  Compared to one year ago, the patient feels her mental health is the same.    Objective    Physical Exam    Vitals:    07/13/17 1337   BP: 126/84   BP Location: Left arm   Patient Position: Sitting   Pulse: 78   SpO2: 96%   Weight: 203 lb 4.8 oz (92.2 kg)   Height: 65\" (165.1 cm)   PainSc:   1       Body mass index is 33.83 kg/(m^2).  Discussed the patient's BMI with her. The BMI is above average; BMI management plan is completed.    Procedure   Procedures       Assessment/Plan   Patient Self-Management and Personalized Health Advice  The patient has been provided with information about: diet, exercise, weight management, the relationship between weight and GERD and fall prevention and preventive services including:   · Exercise counseling provided, Fall Risk plan of care done, Nutrition counseling provided.    Visit Diagnoses:    ICD-10-CM ICD-9-CM   1. Hyperlipidemia, unspecified hyperlipidemia type E78.5 272.4   2. Essential hypertension I10 401.9   3. Gastroesophageal reflux disease, esophagitis presence not specified K21.9 530.81   4. Welcome to Medicare preventive visit Z00.00 V70.0   5. Acquired hypothyroidism E03.9 244.9       Orders Placed This Encounter   Procedures   • Comprehensive Metabolic Panel     Standing Status:   Future   • LP+LDL / HDL Ratio ([facility])     Standing Status:   Future       Outpatient Encounter Prescriptions as of 7/13/2017   Medication Sig Dispense Refill   • aspirin 81 MG tablet Take 1 tablet by mouth daily.     • cetirizine (ZyrTEC) 10 MG tablet Take 1 tablet by mouth daily.   "   • Cholecalciferol (VITAMIN D3) 2000 UNITS capsule Take 1 capsule by mouth daily.     • clindamycin (CLEOCIN T) 1 % lotion Apply 1 application topically 2 (Two) Times a Day. 60 mL 1   • esomeprazole (nexIUM) 40 MG capsule Take 1 capsule by mouth Every Morning Before Breakfast. 90 capsule 3   • fenofibrate micronized (ANTARA) 130 MG capsule Take 1 capsule by mouth Every Morning Before Breakfast. 90 capsule 3   • lisinopril (PRINIVIL,ZESTRIL) 20 MG tablet Take 1 tablet by mouth Daily. 90 tablet 1   • rosuvastatin (CRESTOR) 40 MG tablet Take 1 tablet by mouth Every Night. 30 tablet 3   • SYNTHROID 25 MCG tablet Take 1 tablet by mouth Daily. 90 tablet 1   • vilazodone (VIIBRYD) 40 MG tablet tablet Take 1 tablet by mouth Daily. 90 tablet 1   • YUVAFEM 10 MCG tablet vaginal tablet Insert 1 tablet into the vagina 2 (Two) Times a Week. 24 tablet 1   • [DISCONTINUED] rosuvastatin (CRESTOR) 20 MG tablet Take 1 tablet by mouth Every Night. 90 tablet 1   • meloxicam (MOBIC) 7.5 MG tablet Take 1 tablet by mouth Daily. Take with food 30 tablet 2     No facility-administered encounter medications on file as of 7/13/2017.        Reviewed use of high risk medication in the elderly: yes  Reviewed for potential of harmful drug interactions in the elderly: yes    Follow Up:  Return in about 3 months (around 10/13/2017) for fu hpl with [place].     An After Visit Summary and PPPS with all of these plans were given to the patient.   She does not want any vaccines  She understands the risks with this  She is cautious on steps and is working on more reg exercise  She is trying to limit eating out and working limiting sugars

## 2017-07-13 NOTE — PROGRESS NOTES
QUICK REFERENCE INFORMATION:  The ABCs of the Annual Wellness Visit    Initial Medicare Wellness Visit    HEALTH RISK ASSESSMENT    1950    Recent Hospitalizations:  No hospitalization(s) within the last year..        Current Medical Providers:  Patient Care Team:  Mallika Stewart MD as PCP - General (Internal Medicine)  Britney Cole MD as Consulting Physician (Obstetrics and Gynecology)        Smoking Status:  History   Smoking Status   • Never Smoker   Smokeless Tobacco   • Never Used       Alcohol Consumption:  History   Alcohol Use   • Yes     Comment: OCC. USE       Depression Screen:   PHQ-9 Depression Screening 7/13/2017   Little interest or pleasure in doing things 0   Feeling down, depressed, or hopeless 0   Trouble falling or staying asleep, or sleeping too much 0   Feeling tired or having little energy 0   Poor appetite or overeating 0   Feeling bad about yourself - or that you are a failure or have let yourself or your family down 0   Trouble concentrating on things, such as reading the newspaper or watching television 0   Moving or speaking so slowly that other people could have noticed. Or the opposite - being so fidgety or restless that you have been moving around a lot more than usual 0   Thoughts that you would be better off dead, or of hurting yourself in some way 0   PHQ-9 Total Score 0       Health Habits and Functional and Cognitive Screening:  Functional & Cognitive Status 7/13/2017   Do you have difficulty preparing food and eating? No   Do you have difficulty bathing yourself? No   Do you have difficulty getting dressed? No   Do you have difficulty using the toilet? No   Do you have difficulty moving around from place to place? No   In the past year have you fallen or experienced a near fall? No   Do you need help using the phone?  No   Are you deaf or do you have serious difficulty hearing?  No   Do you need help with transportation? No   Do you need help shopping? No   Do you need help  preparing meals?  No   Do you need help with housework?  No   Do you need help with laundry? No   Do you need help taking your medications? No   Do you need help managing money? No   Do you have difficulty concentrating, remembering or making decisions? No                  Does the patient have evidence of cognitive impairment? No    Asiprin use counseling: Does not need AS but is currently taking (discussed benefits vs risks and patient elects to stay on ASA)      Recent Lab Results:    Visual Acuity:  No exam data present    Age-appropriate Screening Schedule:  Refer to the list below for future screening recommendations based on patient's age, sex and/or medical conditions. Orders for these recommended tests are listed in the plan section. The patient has been provided with a written plan.    Health Maintenance   Topic Date Due   • TDAP/TD VACCINES (1 - Tdap) 01/20/1971   • ZOSTER VACCINE  02/25/2016   • PNEUMOCOCCAL VACCINES (65+ LOW/MEDIUM RISK) (1 of 2 - PCV13) 01/20/2017   • INFLUENZA VACCINE  08/01/2017   • LIPID PANEL  07/11/2018   • PAP SMEAR  12/01/2018   • MAMMOGRAM  04/11/2019   • COLONOSCOPY  05/22/2020        Subjective   History of Present Illness    Dian Jefferson is a 67 y.o. female who presents for an Annual Wellness Visit.    The following portions of the patient's history were reviewed and updated as appropriate: allergies, current medications, past family history, past medical history, past social history, past surgical history and problem list.    Outpatient Medications Prior to Visit   Medication Sig Dispense Refill   • aspirin 81 MG tablet Take 1 tablet by mouth daily.     • cetirizine (ZyrTEC) 10 MG tablet Take 1 tablet by mouth daily.     • Cholecalciferol (VITAMIN D3) 2000 UNITS capsule Take 1 capsule by mouth daily.     • clindamycin (CLEOCIN T) 1 % lotion Apply 1 application topically 2 (Two) Times a Day. 60 mL 1   • esomeprazole (nexIUM) 40 MG capsule Take 1 capsule by mouth  "Every Morning Before Breakfast. 90 capsule 3   • fenofibrate micronized (ANTARA) 130 MG capsule Take 1 capsule by mouth Every Morning Before Breakfast. 90 capsule 3   • lisinopril (PRINIVIL,ZESTRIL) 20 MG tablet Take 1 tablet by mouth Daily. 90 tablet 1   • rosuvastatin (CRESTOR) 20 MG tablet Take 1 tablet by mouth Every Night. 90 tablet 1   • SYNTHROID 25 MCG tablet Take 1 tablet by mouth Daily. 90 tablet 1   • vilazodone (VIIBRYD) 40 MG tablet tablet Take 1 tablet by mouth Daily. 90 tablet 1   • YUVAFEM 10 MCG tablet vaginal tablet Insert 1 tablet into the vagina 2 (Two) Times a Week. 24 tablet 1   • meloxicam (MOBIC) 7.5 MG tablet Take 1 tablet by mouth Daily. Take with food 30 tablet 2     No facility-administered medications prior to visit.        Patient Active Problem List   Diagnosis   • Anxiety   • Gastroesophageal reflux disease   • Menopausal flushing   • Essential hypertension   • Hyperlipidemia   • Hypothyroidism   • Abnormal mammogram   • Muscle pain   • Breast implant rupture   • Vitamin D deficiency   • Tubular adenoma of colon   • Sigmoid diverticulosis   • Internal hemorrhoids       Advance Care Planning:  has NO advance directive - information provided to the patient today    Identification of Risk Factors:  Risk factors include: {MC; WELLNESS RISK FACTORS:23904}.    Review of Systems    Compared to one year ago, the patient feels her physical health is {better worse same:35311}.  Compared to one year ago, the patient feels her mental health is {better worse same:65596}.    Objective     Physical Exam    Vitals:    07/13/17 1337   BP: 126/84   BP Location: Left arm   Patient Position: Sitting   Pulse: 78   SpO2: 96%   Weight: 203 lb 4.8 oz (92.2 kg)   Height: 65\" (165.1 cm)   PainSc:   1       Body mass index is 33.83 kg/(m^2).  Discussed the patient's BMI with her. The BMI {BMI plan (MU F measure 421):05035}.    Assessment/Plan   Patient Self-Management and Personalized Health Advice  The " patient has been provided with information about: {; PERSONALIZED HEALTH ADVICE:59212} and preventive services including:   · {plan:95241}.    Visit Diagnoses:  No diagnosis found.    No orders of the defined types were placed in this encounter.      Outpatient Encounter Prescriptions as of 7/13/2017   Medication Sig Dispense Refill   • aspirin 81 MG tablet Take 1 tablet by mouth daily.     • cetirizine (ZyrTEC) 10 MG tablet Take 1 tablet by mouth daily.     • Cholecalciferol (VITAMIN D3) 2000 UNITS capsule Take 1 capsule by mouth daily.     • clindamycin (CLEOCIN T) 1 % lotion Apply 1 application topically 2 (Two) Times a Day. 60 mL 1   • esomeprazole (nexIUM) 40 MG capsule Take 1 capsule by mouth Every Morning Before Breakfast. 90 capsule 3   • fenofibrate micronized (ANTARA) 130 MG capsule Take 1 capsule by mouth Every Morning Before Breakfast. 90 capsule 3   • lisinopril (PRINIVIL,ZESTRIL) 20 MG tablet Take 1 tablet by mouth Daily. 90 tablet 1   • rosuvastatin (CRESTOR) 20 MG tablet Take 1 tablet by mouth Every Night. 90 tablet 1   • SYNTHROID 25 MCG tablet Take 1 tablet by mouth Daily. 90 tablet 1   • vilazodone (VIIBRYD) 40 MG tablet tablet Take 1 tablet by mouth Daily. 90 tablet 1   • YUVAFEM 10 MCG tablet vaginal tablet Insert 1 tablet into the vagina 2 (Two) Times a Week. 24 tablet 1   • meloxicam (MOBIC) 7.5 MG tablet Take 1 tablet by mouth Daily. Take with food 30 tablet 2     No facility-administered encounter medications on file as of 7/13/2017.        Reviewed use of high risk medication in the elderly: {Response; yes/no/na:63}  Reviewed for potential of harmful drug interactions in the elderly: {Response; yes/no/na:63}    Follow Up:  No Follow-up on file.     An After Visit Summary and PPPS with all of these plans were given to the patient.

## 2017-07-13 NOTE — PATIENT INSTRUCTIONS
Fall Prevention in the Home   Falls can cause injuries. They can happen to people of all ages. There are many things you can do to make your home safe and to help prevent falls.   WHAT CAN I DO ON THE OUTSIDE OF MY HOME?  · Regularly fix the edges of walkways and driveways and fix any cracks.  · Remove anything that might make you trip as you walk through a door, such as a raised step or threshold.  · Trim any bushes or trees on the path to your home.  · Use bright outdoor lighting.  · Clear any walking paths of anything that might make someone trip, such as rocks or tools.  · Regularly check to see if handrails are loose or broken. Make sure that both sides of any steps have handrails.  · Any raised decks and porches should have guardrails on the edges.  · Have any leaves, snow, or ice cleared regularly.  · Use sand or salt on walking paths during winter.  · Clean up any spills in your garage right away. This includes oil or grease spills.  WHAT CAN I DO IN THE BATHROOM?   · Use night lights.  · Install grab bars by the toilet and in the tub and shower. Do not use towel bars as grab bars.  · Use non-skid mats or decals in the tub or shower.  · If you need to sit down in the shower, use a plastic, non-slip stool.  · Keep the floor dry. Clean up any water that spills on the floor as soon as it happens.  · Remove soap buildup in the tub or shower regularly.  · Attach bath mats securely with double-sided non-slip rug tape.  · Do not have throw rugs and other things on the floor that can make you trip.  WHAT CAN I DO IN THE BEDROOM?  · Use night lights.  · Make sure that you have a light by your bed that is easy to reach.  · Do not use any sheets or blankets that are too big for your bed. They should not hang down onto the floor.  · Have a firm chair that has side arms. You can use this for support while you get dressed.  · Do not have throw rugs and other things on the floor that can make you trip.  WHAT CAN I DO IN  THE KITCHEN?  · Clean up any spills right away.  · Avoid walking on wet floors.  · Keep items that you use a lot in easy-to-reach places.  · If you need to reach something above you, use a strong step stool that has a grab bar.  · Keep electrical cords out of the way.  · Do not use floor polish or wax that makes floors slippery. If you must use wax, use non-skid floor wax.  · Do not have throw rugs and other things on the floor that can make you trip.  WHAT CAN I DO WITH MY STAIRS?  · Do not leave any items on the stairs.  · Make sure that there are handrails on both sides of the stairs and use them. Fix handrails that are broken or loose. Make sure that handrails are as long as the stairways.  · Check any carpeting to make sure that it is firmly attached to the stairs. Fix any carpet that is loose or worn.  · Avoid having throw rugs at the top or bottom of the stairs. If you do have throw rugs, attach them to the floor with carpet tape.  · Make sure that you have a light switch at the top of the stairs and the bottom of the stairs. If you do not have them, ask someone to add them for you.  WHAT ELSE CAN I DO TO HELP PREVENT FALLS?  · Wear shoes that:    Do not have high heels.    Have rubber bottoms.    Are comfortable and fit you well.    Are closed at the toe. Do not wear sandals.  · If you use a stepladder:    Make sure that it is fully opened. Do not climb a closed stepladder.    Make sure that both sides of the stepladder are locked into place.    Ask someone to hold it for you, if possible.  · Clearly mason and make sure that you can see:    Any grab bars or handrails.    First and last steps.    Where the edge of each step is.  · Use tools that help you move around (mobility aids) if they are needed. These include:    Canes.    Walkers.    Scooters.    Crutches.  · Turn on the lights when you go into a dark area. Replace any light bulbs as soon as they burn out.  · Set up your furniture so you have a clear  path. Avoid moving your furniture around.  · If any of your floors are uneven, fix them.  · If there are any pets around you, be aware of where they are.  · Review your medicines with your doctor. Some medicines can make you feel dizzy. This can increase your chance of falling.  Ask your doctor what other things that you can do to help prevent falls.     This information is not intended to replace advice given to you by your health care provider. Make sure you discuss any questions you have with your health care provider.     Document Released: 10/14/2010 Document Revised: 05/03/2016 Document Reviewed: 01/22/2016  [employer] Interactive Patient Education ©2017 [employer]  Exercising to Lose Weight  Exercising can help you to lose weight. In order to lose weight through exercise, you need to do vigorous-intensity exercise. You can tell that you are exercising with vigorous intensity if you are breathing very hard and fast and cannot hold a conversation while exercising.  Moderate-intensity exercise helps to maintain your current weight. You can tell that you are exercising at a moderate level if you have a higher heart rate and faster breathing, but you are still able to hold a conversation.  HOW OFTEN SHOULD I EXERCISE?  Choose an activity that you enjoy and set realistic goals. Your health care provider can help you to make an activity plan that works for you. Exercise regularly as directed by your health care provider. This may include:  · Doing resistance training twice each week, such as:    Push-ups.    Sit-ups.    Lifting weights.    Using resistance bands.  · Doing a given intensity of exercise for a given amount of time. Choose from these options:    150 minutes of moderate-intensity exercise every week.    75 minutes of vigorous-intensity exercise every week.    A mix of moderate-intensity and vigorous-intensity exercise every week.  Children, pregnant women, people who are out of shape, people who are  overweight, and older adults may need to consult a health care provider for individual recommendations. If you have any sort of medical condition, be sure to consult your health care provider before starting a new exercise program.  WHAT ARE SOME ACTIVITIES THAT CAN HELP ME TO LOSE WEIGHT?   · Walking at a rate of at least 4.5 miles an hour.  · Jogging or running at a rate of 5 miles per hour.  · Biking at a rate of at least 10 miles per hour.  · Lap swimming.  · Roller-skating or in-line skating.  · Cross-country skiing.  · Vigorous competitive sports, such as football, basketball, and soccer.  · Jumping rope.  · Aerobic dancing.  HOW CAN I BE MORE ACTIVE IN MY DAY-TO-DAY ACTIVITIES?  · Use the stairs instead of the elevator.  · Take a walk during your lunch break.  · If you drive, park your car farther away from work or school.  · If you take public transportation, get off one stop early and walk the rest of the way.  · Make all of your phone calls while standing up and walking around.  · Get up, stretch, and walk around every 30 minutes throughout the day.  WHAT GUIDELINES SHOULD I FOLLOW WHILE EXERCISING?  · Do not exercise so much that you hurt yourself, feel dizzy, or get very short of breath.  · Consult your health care provider prior to starting a new exercise program.  · Wear comfortable clothes and shoes with good support.  · Drink plenty of water while you exercise to prevent dehydration or heat stroke. Body water is lost during exercise and must be replaced.  · Work out until you breathe faster and your heart beats faster.     This information is not intended to replace advice given to you by your health care provider. Make sure you discuss any questions you have with your health care provider.     Document Released: 01/20/2012 Document Revised: 01/08/2016 Document Reviewed: 05/21/2015  [employer] Interactive Patient Education ©2017 [employer]  Fat and Cholesterol Restricted Diet  Getting too much fat  "and cholesterol in your diet may cause health problems. Following this diet helps keep your fat and cholesterol at normal levels. This can keep you from getting sick.  WHAT TYPES OF FAT SHOULD I CHOOSE?  · Choose monosaturated and polyunsaturated fats. These are found in foods such as olive oil, canola oil, flaxseeds, walnuts, almonds, and seeds.  · Eat more omega-3 fats. Good choices include salmon, mackerel, sardines, tuna, flaxseed oil, and ground flaxseeds.  · Limit saturated fats. These are in animal products such as meats, butter, and cream. They can also be in plant products such as palm oil, palm kernel oil, and coconut oil.  ·  Avoid foods with partially hydrogenated oils in them. These contain trans fats. Examples of foods that have trans fats are stick margarine, some tub margarines, cookies, crackers, and other baked goods.  WHAT GENERAL GUIDELINES DO I NEED TO FOLLOW?   · Check food labels. Look for the words \"trans fat\" and \"saturated fat.\"  · When preparing a meal:    Fill half of your plate with vegetables and green salads.    Fill one fourth of your plate with whole grains. Look for the word \"whole\" as the first word in the ingredient list.    Fill one fourth of your plate with lean protein foods.  · Limit fruit to two servings a day. Choose fruit instead of juice.  · Eat more foods with soluble fiber. Examples of foods with this type of fiber are apples, broccoli, carrots, beans, peas, and barley. Try to get 20-30 g (grams) of fiber per day.  · Eat more home-cooked foods. Eat less at restaurants and buffets.  · Limit or avoid alcohol.  · Limit foods high in starch and sugar.  · Limit fried foods.  · Cook foods without frying them. Baking, boiling, grilling, and broiling are all great options.  · Lose weight if you are overweight. Losing even a small amount of weight can help your overall health. It can also help prevent diseases such as diabetes and heart disease.  WHAT FOODS CAN I " EAT?  Grains  Whole grains, such as whole wheat or whole grain breads, crackers, cereals, and pasta. Unsweetened oatmeal, bulgur, barley, quinoa, or brown rice. Corn or whole wheat flour tortillas.  Vegetables  Fresh or frozen vegetables (raw, steamed, roasted, or grilled). Green salads.  Fruits  All fresh, canned (in natural juice), or frozen fruits.  Meat and Other Protein Products  Ground beef (85% or leaner), grass-fed beef, or beef trimmed of fat. Skinless chicken or turkey. Ground chicken or turkey. Pork trimmed of fat. All fish and seafood. Eggs. Dried beans, peas, or lentils. Unsalted nuts or seeds. Unsalted canned or dry beans.  Dairy  Low-fat dairy products, such as skim or 1% milk, 2% or reduced-fat cheeses, low-fat ricotta or cottage cheese, or plain low-fat yogurt.  Fats and Oils  Tub margarines without trans fats. Light or reduced-fat mayonnaise and salad dressings. Avocado. Olive, canola, sesame, or safflower oils. Natural peanut or almond butter (choose ones without added sugar and oil).  The items listed above may not be a complete list of recommended foods or beverages. Contact your dietitian for more options.  WHAT FOODS ARE NOT RECOMMENDED?  Grains  White bread. White pasta. White rice. Cornbread. Bagels, pastries, and croissants. Crackers that contain trans fat.  Vegetables  White potatoes. Corn. Creamed or fried vegetables. Vegetables in a cheese sauce.  Fruits  Dried fruits. Canned fruit in light or heavy syrup. Fruit juice.  Meat and Other Protein Products  Fatty cuts of meat. Ribs, chicken wings, petersen, sausage, bologna, salami, chitterlings, fatback, hot dogs, bratwurst, and packaged luncheon meats. Liver and organ meats.  Dairy  Whole or 2% milk, cream, half-and-half, and cream cheese. Whole milk cheeses. Whole-fat or sweetened yogurt. Full-fat cheeses. Nondairy creamers and whipped toppings. Processed cheese, cheese spreads, or cheese curds.  Sweets and Desserts  Corn syrup, sugars,  honey, and molasses. Candy. Jam and jelly. Syrup. Sweetened cereals. Cookies, pies, cakes, donuts, muffins, and ice cream.  Fats and Oils  Butter, stick margarine, lard, shortening, ghee, or pteersen fat. Coconut, palm kernel, or palm oils.  Beverages  Alcohol. Sweetened drinks (such as sodas, lemonade, and fruit drinks or punches).  The items listed above may not be a complete list of foods and beverages to avoid. Contact your dietitian for more information.     This information is not intended to replace advice given to you by your health care provider. Make sure you discuss any questions you have with your health care provider.     Document Released: 2013 Document Revised: 2016 Document Reviewed: 2015  [employer] Interactive Patient Education © [employer]    Medicare Wellness  Personal Prevention Plan of Service     Date of Office Visit:  2017  Encounter Provider:  Mallika Stewart MD  Place of Service:  [facility]  Patient Name: Dian Jefferson  :  1950    As part of the Medicare Wellness portion of your visit today, we are providing you with this personalized preventive plan of services (PPPS). This plan is based upon recommendations of the [place] Preventive Services Task Force (USPSTF) and the Advisory Committee on Immunization Practices (ACIP).    This lists the preventive care services that should be considered, and provides dates of when you are due. Items listed as completed are up-to-date and do not require any further intervention.    Health Maintenance   Topic Date Due   • TDAP/TD VACCINES (1 - Tdap) 1971   • ZOSTER VACCINE  2016   • PNEUMOCOCCAL VACCINES (65+ LOW/MEDIUM RISK) (1 of 2 - PCV13) 2017   • INFLUENZA VACCINE  2017   • LIPID PANEL  2018   • MEDICARE ANNUAL WELLNESS  2018   • PAP SMEAR  2018   • MAMMOGRAM  2019   • COLONOSCOPY  2020   • HEPATITIS C SCREENING   Completed       Orders Placed This Encounter   Procedures   • Comprehensive Metabolic Panel     Standing Status:   Future   • LP+LDL / HDL Ratio ([facility])     Standing Status:   Future       No Follow-up on file.

## 2017-07-13 NOTE — PROGRESS NOTES
Subjective   Dian Jefferson is a 67 y.o. female here today to f/u on HTN, hyperlipidemia, elevated glucose and hypothyroidism.      History of Present Illness   She has been having right hip pain on and off nd has been seeing a chiro  It is worse with a lot of walking    Pt has been taking BP meds as prescribed without any problems.  No HA  No episodes of orthostasis  Pt has been taking cholesterol meds as prescribed.  No difficulties with myalgias.   Pt has been compliant with meds for GERD.  No sx as long as pt takes medicine as prescribed.  No epigastric pain or reflux sx    The following portions of the patient's history were reviewed and updated as appropriate: allergies, current medications, past medical history, past social history and problem list.  She has been trying to exercise  She is limiting PO intake    Review of Systems   All other systems reviewed and are negative.      Objective   Physical Exam   Constitutional: She is oriented to person, place, and time. She appears well-developed and well-nourished.   HENT:   Head: Normocephalic and atraumatic.   Right Ear: External ear normal.   Left Ear: External ear normal.   Mouth/Throat: Oropharynx is clear and moist.   Eyes: Conjunctivae and EOM are normal. Pupils are equal, round, and reactive to light.   Neck: Normal range of motion. No tracheal deviation present. No thyromegaly present.   Cardiovascular: Normal rate, regular rhythm, normal heart sounds and intact distal pulses.    Pulmonary/Chest: Effort normal and breath sounds normal.   Abdominal: Soft. Bowel sounds are normal. She exhibits no distension. There is no tenderness.   Musculoskeletal: Normal range of motion. She exhibits no edema or deformity.   Neurological: She is alert and oriented to person, place, and time.   Skin: Skin is warm and dry.   Psychiatric: She has a normal mood and affect. Her behavior is normal. Judgment and thought content normal.   Vitals reviewed.      Vitals:     07/13/17 1337   BP: 126/84   Pulse: 78   SpO2: 96%     Results Encounter on 07/12/2017   Component Date Value Ref Range Status   • Glucose 07/11/2017 105* 65 - 99 mg/dL Final   • BUN 07/11/2017 18  8 - 23 mg/dL Final   • Creatinine 07/11/2017 1.18* 0.57 - 1.00 mg/dL Final   • eGFR Non African Am 07/11/2017 46* >60 mL/min/1.73 Final   • eGFR African Am 07/11/2017 56* >60 mL/min/1.73 Final   • BUN/Creatinine Ratio 07/11/2017 15.3  7.0 - 25.0 Final   • Sodium 07/11/2017 145  136 - 145 mmol/L Final   • Potassium 07/11/2017 5.1  3.5 - 5.2 mmol/L Final   • Chloride 07/11/2017 106  98 - 107 mmol/L Final   • Total CO2 07/11/2017 22.9  22.0 - 29.0 mmol/L Final   • Calcium 07/11/2017 9.9  8.6 - 10.5 mg/dL Final   • Total Protein 07/11/2017 7.3  6.0 - 8.5 g/dL Final   • Albumin 07/11/2017 5.00  3.50 - 5.20 g/dL Final   • Globulin 07/11/2017 2.3  gm/dL Final   • A/G Ratio 07/11/2017 2.2  g/dL Final   • Total Bilirubin 07/11/2017 0.3  0.1 - 1.2 mg/dL Final   • Alkaline Phosphatase 07/11/2017 51  39 - 117 U/L Final   • AST (SGOT) 07/11/2017 29  1 - 32 U/L Final   • ALT (SGPT) 07/11/2017 33  1 - 33 U/L Final   • Hemoglobin A1C 07/11/2017 6.01* 4.80 - 5.60 % Final    Comment: Hemoglobin A1C Ranges:  Increased Risk for Diabetes  5.7% to 6.4%  Diabetes                     >= 6.5%  Diabetic Goal                < 7.0%     • Total Cholesterol 07/11/2017 226* 0 - 200 mg/dL Final   • Triglycerides 07/11/2017 136  0 - 150 mg/dL Final   • HDL Cholesterol 07/11/2017 47  40 - 60 mg/dL Final   • VLDL Cholesterol 07/11/2017 27.2  5 - 40 mg/dL Final   • LDL Cholesterol  07/11/2017 152* 0 - 100 mg/dL Final   • LDL/HDL Ratio 07/11/2017 3.23   Final   • TSH 07/11/2017 2.08  0.27 - 4.2 mIU/mL Final   • Hep C Virus Ab 07/11/2017 0.1  0.0 - 0.9 s/co ratio Final    Comment:                                   Negative:     < 0.8                               Indeterminate: 0.8 - 0.9                                    Positive:     > 0.9   The CDC  recommends that a positive HCV antibody result   be followed up with a HCV Nucleic Acid Amplification   test ([id number]).          Current Outpatient Prescriptions:   •  aspirin 81 MG tablet, Take 1 tablet by mouth daily., Disp: , Rfl:   •  cetirizine (ZyrTEC) 10 MG tablet, Take 1 tablet by mouth daily., Disp: , Rfl:   •  Cholecalciferol (VITAMIN D3) 2000 UNITS capsule, Take 1 capsule by mouth daily., Disp: , Rfl:   •  clindamycin (CLEOCIN T) 1 % lotion, Apply 1 application topically 2 (Two) Times a Day., Disp: 60 mL, Rfl: 1  •  esomeprazole (nexIUM) 40 MG capsule, Take 1 capsule by mouth Every Morning Before Breakfast., Disp: 90 capsule, Rfl: 3  •  fenofibrate micronized (ANTARA) 130 MG capsule, Take 1 capsule by mouth Every Morning Before Breakfast., Disp: 90 capsule, Rfl: 3  •  lisinopril (PRINIVIL,ZESTRIL) 20 MG tablet, Take 1 tablet by mouth Daily., Disp: 90 tablet, Rfl: 1  •  rosuvastatin (CRESTOR) 40 MG tablet, Take 1 tablet by mouth Every Night., Disp: 30 tablet, Rfl: 3  •  SYNTHROID 25 MCG tablet, Take 1 tablet by mouth Daily., Disp: 90 tablet, Rfl: 1  •  vilazodone (VIIBRYD) 40 MG tablet tablet, Take 1 tablet by mouth Daily., Disp: 90 tablet, Rfl: 1  •  YUVAFEM 10 MCG tablet vaginal tablet, Insert 1 tablet into the vagina 2 (Two) Times a Week., Disp: 24 tablet, Rfl: 1  •  meloxicam (MOBIC) 7.5 MG tablet, Take 1 tablet by mouth Daily. Take with food, Disp: 30 tablet, Rfl: 2      Assessment/Plan   Diagnoses and all orders for this visit:    Hyperlipidemia, unspecified hyperlipidemia type  -     rosuvastatin (CRESTOR) 40 MG tablet; Take 1 tablet by mouth Every Night.  -     Comprehensive Metabolic Panel; Future  -     LP+LDL / HDL Ratio ([facility]); Future    Essential hypertension    Gastroesophageal reflux disease, esophagitis presence not specified    Welcome to Medicare preventive visit    Acquired hypothyroidism      1. Right hip pain-  She will cont with chiro  I have rec PT  She does not want any meds right  now  2. Hypothyroidism-  She is stable with synthroid  3. GERD- she is doing well with meds  4. HPL- ok with crestor but she is still running high.  I want to increase her to 40mg  5. HTN- She is doing well with lisinopril

## 2017-07-31 RX ORDER — LISINOPRIL 20 MG/1
TABLET ORAL
Qty: 90 TABLET | Refills: 1 | Status: SHIPPED | OUTPATIENT
Start: 2017-07-31 | End: 2017-09-11 | Stop reason: SDUPTHER

## 2017-08-08 ENCOUNTER — TELEPHONE (OUTPATIENT)
Dept: INTERNAL MEDICINE | Facility: CLINIC | Age: 67
End: 2017-08-08

## 2017-08-08 NOTE — TELEPHONE ENCOUNTER
----- Message from LIZABETH Sales sent at 8/8/2017  1:28 PM EDT -----  Regarding: RE: Patient Call  Contact: [phone]  She can try some oral benadryl to help with itching, but if the rash gets worse, she will need to be seen.    ----- Message -----     From: Karmen Solitario MA     Sent: 8/8/2017   1:19 PM       To: LIZABETH Sales  Subject: FW: Patient Call                                 Please advise if there is anything else she can do before scheduling an appt to come in.     ----- Message -----     From: Terry Santiago Rep     Sent: 8/8/2017  12:59 PM       To: Karmen Solitario MA  Subject: FW: Patient Call                                 Below is the patient we just talked about.    ----- Message -----     From: Terry Santiago Rep     Sent: 8/8/2017  12:52 PM       To: Charo Ibarra MA  Subject: Patient Call                                     I offered patient appointment, but she wanted to talk to you first.  On Saturday, she sustained a bug bite (doesn't know what kind of bite), to the right side of her right eye.  It now is becoming more and more itchy.  She got some hydrocortisone cream and Children's Benadryl for that.  The area is slightly red and swollen, but not warm to the touch.  She was wondering if Dr. Stewart could recommend anything else to help with the intense itching.

## 2017-08-08 NOTE — TELEPHONE ENCOUNTER
Pt aware that Brianne recommended oral Benadryl. Pt asked if it were beneficial to just schedule an appt. Dr. Stewart is booked for tomorrow but Felisha did have an opening. Pt is scheduled for 1:20pm tomorrow.

## 2017-08-09 ENCOUNTER — OFFICE VISIT (OUTPATIENT)
Dept: INTERNAL MEDICINE | Facility: CLINIC | Age: 67
End: 2017-08-09

## 2017-08-09 VITALS
HEART RATE: 86 BPM | OXYGEN SATURATION: 97 % | DIASTOLIC BLOOD PRESSURE: 80 MMHG | WEIGHT: 200.5 LBS | SYSTOLIC BLOOD PRESSURE: 130 MMHG | HEIGHT: 65 IN | BODY MASS INDEX: 33.41 KG/M2

## 2017-08-09 DIAGNOSIS — L03.211 CELLULITIS OF FACE: ICD-10-CM
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
## 2017-08-09 PROCEDURE — 99213 OFFICE O/P EST LOW 20 MIN: CPT | Performed by: NURSE PRACTITIONER

## 2017-08-09 RX ORDER — METHYLPREDNISOLONE 4 MG/1
TABLET ORAL
Qty: 21 TABLET | Refills: 0 | Status: SHIPPED | OUTPATIENT
Start: 2017-08-09 | End: 2017-10-19

## 2017-08-09 RX ORDER — CEPHALEXIN 500 MG/1
500 CAPSULE ORAL 2 TIMES DAILY
Qty: 20 CAPSULE | Refills: 0 | Status: SHIPPED | OUTPATIENT
Start: 2017-08-09 | End: 2017-10-19

## 2017-08-09 NOTE — PROGRESS NOTES
Subjective   Dian Jefferson is a 67 y.o. female who states that she she thinks she has a bug bite on her face beside her right eye. The area is itchy, has been slightly swollen, and started on Sat.     History of Present Illness   The patient is here today with c/o a bug bite by her right eye. She states the area appeared Saturday, since then it has gotten bigger and is very itchy. She feels that she also has pressure around her eye as well. She tried hydrocortisone cream, zyrtec and benadryl with some relief.   She is very sensitive to mosquito bites.   The following portions of the patient's history were reviewed and updated as appropriate: allergies, current medications, past family history, past medical history, past social history, past surgical history and problem list.    Review of Systems   Constitutional: Negative.    Respiratory: Negative.    Cardiovascular: Negative.    All other systems reviewed and are negative.      Objective   Physical Exam   Constitutional: She appears well-developed and well-nourished.   Neck: Normal range of motion. Neck supple. No thyromegaly present.   Cardiovascular: Normal rate, regular rhythm, normal heart sounds and intact distal pulses.    Pulmonary/Chest: Effort normal and breath sounds normal.   Skin: Skin is warm and dry.   Multiple pink papules present right forehead, corner of right eye with pink papule, 2 scabs present  Soreness when eyebrow palpated  Area is not hot and surrounding with out erythema   Psychiatric: She has a normal mood and affect. Her behavior is normal. Judgment and thought content normal.       Assessment/Plan   There are no diagnoses linked to this encounter.    1. Insect bite/cellulitis- keflex BID for 10 days and also will give medrol dose pack for itching, yogurt daily

## 2017-09-11 ENCOUNTER — TELEPHONE (OUTPATIENT)
Dept: INTERNAL MEDICINE | Facility: CLINIC | Age: 67
End: 2017-09-11

## 2017-09-11 DIAGNOSIS — E78.5 HYPERLIPIDEMIA, UNSPECIFIED HYPERLIPIDEMIA TYPE: ICD-10-CM

## 2017-09-11 RX ORDER — LEVOTHYROXINE SODIUM 25 MCG
25 TABLET ORAL DAILY
Qty: 90 TABLET | Refills: 3 | Status: SHIPPED | OUTPATIENT
Start: 2017-09-11 | End: 2018-10-02 | Stop reason: SDUPTHER

## 2017-09-11 RX ORDER — LISINOPRIL 20 MG/1
20 TABLET ORAL DAILY
Qty: 90 TABLET | Refills: 3 | Status: SHIPPED | OUTPATIENT
Start: 2017-09-11 | End: 2018-10-12 | Stop reason: SDUPTHER

## 2017-09-11 RX ORDER — FENOFIBRATE 130 MG/1
130 CAPSULE ORAL
Qty: 90 CAPSULE | Refills: 3 | Status: SHIPPED | OUTPATIENT
Start: 2017-09-11 | End: 2018-09-23 | Stop reason: SDUPTHER

## 2017-09-11 RX ORDER — ESOMEPRAZOLE MAGNESIUM 40 MG/1
40 CAPSULE, DELAYED RELEASE ORAL
Qty: 90 CAPSULE | Refills: 3 | Status: SHIPPED | OUTPATIENT
Start: 2017-09-11 | End: 2017-12-04

## 2017-09-11 RX ORDER — ROSUVASTATIN CALCIUM 40 MG/1
40 TABLET, COATED ORAL NIGHTLY
Qty: 30 TABLET | Refills: 3 | Status: SHIPPED | OUTPATIENT
Start: 2017-09-11 | End: 2017-11-07 | Stop reason: SDUPTHER

## 2017-09-11 NOTE — TELEPHONE ENCOUNTER
RX SENT TO PHARMACY    ----- Message from Fely Moya sent at 9/11/2017  3:17 PM EDT -----  PT NEEDS RF ON ESOMEPRAZOLE AND FENOFIBRATE 90 SUPPLY TO [facility]. SHE WILL ALSO NEED LISINOPRIL, SYNTHROID AND ROSUVASTATIN SENT TO [facility] FOR 90 DAYS IN A COUPLE OF WEEKS AND THEY DONT HAVE IT ON FILE AT THE PHARMACY

## 2017-10-13 ENCOUNTER — RESULTS ENCOUNTER (OUTPATIENT)
Dept: INTERNAL MEDICINE | Facility: CLINIC | Age: 67
End: 2017-10-13

## 2017-10-13 DIAGNOSIS — E78.5 HYPERLIPIDEMIA, UNSPECIFIED HYPERLIPIDEMIA TYPE: ICD-10-CM

## 2017-10-18 LAB
ALBUMIN SERPL-MCNC: 5.1 G/DL (ref 3.5–5.2)
ALBUMIN/GLOB SERPL: 2.3 G/DL
ALP SERPL-CCNC: 50 U/L (ref 39–117)
ALT SERPL-CCNC: 27 U/L (ref 1–33)
AST SERPL-CCNC: 26 U/L (ref 1–32)
BILIRUB SERPL-MCNC: 0.4 MG/DL (ref 0.1–1.2)
BUN SERPL-MCNC: 18 MG/DL (ref 8–23)
BUN/CREAT SERPL: 16.1 (ref 7–25)
CALCIUM SERPL-MCNC: 10.4 MG/DL (ref 8.6–10.5)
CHLORIDE SERPL-SCNC: 102 MMOL/L (ref 98–107)
CHOLEST SERPL-MCNC: 213 MG/DL (ref 0–200)
CO2 SERPL-SCNC: 25.6 MMOL/L (ref 22–29)
CREAT SERPL-MCNC: 1.12 MG/DL (ref 0.57–1)
GLOBULIN SER CALC-MCNC: 2.2 GM/DL
GLUCOSE SERPL-MCNC: 107 MG/DL (ref 65–99)
HDLC SERPL-MCNC: 48 MG/DL (ref 40–60)
LDLC SERPL CALC-MCNC: 133 MG/DL (ref 0–100)
LDLC/HDLC SERPL: 2.78 {RATIO}
POTASSIUM SERPL-SCNC: 4.8 MMOL/L (ref 3.5–5.2)
PROT SERPL-MCNC: 7.3 G/DL (ref 6–8.5)
SODIUM SERPL-SCNC: 142 MMOL/L (ref 136–145)
TRIGL SERPL-MCNC: 158 MG/DL (ref 0–150)
VLDLC SERPL CALC-MCNC: 31.6 MG/DL (ref 5–40)

## 2017-10-19 ENCOUNTER — HOSPITAL ENCOUNTER (OUTPATIENT)
Dept: GENERAL RADIOLOGY | Facility: HOSPITAL | Age: 67
Discharge: HOME | End: 2017-10-19
Admitting: INTERNAL MEDICINE

## 2017-10-19 ENCOUNTER — OFFICE VISIT (OUTPATIENT)
Dept: INTERNAL MEDICINE | Facility: CLINIC | Age: 67
End: 2017-10-19

## 2017-10-19 VITALS
WEIGHT: 195 LBS | HEART RATE: 83 BPM | HEIGHT: 65 IN | SYSTOLIC BLOOD PRESSURE: 120 MMHG | BODY MASS INDEX: 32.49 KG/M2 | DIASTOLIC BLOOD PRESSURE: 68 MMHG | OXYGEN SATURATION: 97 %

## 2017-10-19 DIAGNOSIS — R06.09 DOE (DYSPNEA ON EXERTION): ICD-10-CM

## 2017-10-19 DIAGNOSIS — I10 ESSENTIAL HYPERTENSION: ICD-10-CM

## 2017-10-19 DIAGNOSIS — M25.552 BILATERAL HIP PAIN: ICD-10-CM

## 2017-10-19 DIAGNOSIS — E78.5 HYPERLIPIDEMIA, UNSPECIFIED HYPERLIPIDEMIA TYPE: Primary | ICD-10-CM

## 2017-10-19 DIAGNOSIS — M25.551 BILATERAL HIP PAIN: ICD-10-CM

## 2017-10-19 PROCEDURE — 93000 ELECTROCARDIOGRAM COMPLETE: CPT | Performed by: INTERNAL MEDICINE

## 2017-10-19 PROCEDURE — 99214 OFFICE O/P EST MOD 30 MIN: CPT | Performed by: INTERNAL MEDICINE

## 2017-10-19 PROCEDURE — 72170 X-RAY EXAM OF PELVIS: CPT

## 2017-10-19 RX ORDER — MELOXICAM 7.5 MG/1
7.5 TABLET ORAL DAILY
Qty: 30 TABLET | Refills: 2 | Status: SHIPPED | OUTPATIENT
Start: 2017-10-19 | End: 2018-01-22 | Stop reason: SDUPTHER

## 2017-10-19 NOTE — PROGRESS NOTES
Subjective   Dian Jefferson is a 67 y.o. female who is here to follow up on Hypothyroidism, HTN, and HPL. She has bilat. Hip pain.     History of Present Illness   Pt has been taking cholesterol meds as prescribed.  No difficulties with myalgias.   Pt has been taking BP meds as prescribed without any problems.  No HA  No episodes of orthostasis  Pt has lost some weight  She has been walking a lot more  She has to stop walking after about 1/2 mile.  She says it has been bothering her for awhile but is getting worse now that she is walking more.   She denies any trauma  She has also been having some worsening VASUQEZ with her new exercise.  She denies any chest pain recently  But about 1  Month ago she had an episode of chest tightness that radiated in her back  She says it lasted about 10 minutes and then resolved    The following portions of the patient's history were reviewed and updated as appropriate: allergies, current medications, past medical history, past social history and problem list.  Father with CAD 50s    Review of Systems   All other systems reviewed and are negative.      Objective   Physical Exam   Constitutional: She is oriented to person, place, and time. She appears well-developed and well-nourished.   HENT:   Head: Normocephalic and atraumatic.   Right Ear: External ear normal.   Left Ear: External ear normal.   Mouth/Throat: Oropharynx is clear and moist.   Eyes: Conjunctivae and EOM are normal. Pupils are equal, round, and reactive to light.   Neck: Normal range of motion. No tracheal deviation present. No thyromegaly present.   Cardiovascular: Normal rate, regular rhythm, normal heart sounds and intact distal pulses.    Pulmonary/Chest: Effort normal and breath sounds normal.   Abdominal: Soft. Bowel sounds are normal. She exhibits no distension. There is no tenderness.   Musculoskeletal: Normal range of motion. She exhibits no edema or deformity.   Neurological: She is alert and oriented  to person, place, and time.   Skin: Skin is warm and dry.   Psychiatric: She has a normal mood and affect. Her behavior is normal. Judgment and thought content normal.   Vitals reviewed.    Vitals:    10/19/17 1419   BP: 120/68   Pulse: 83   SpO2: 97%     Results Encounter on 10/13/2017   Component Date Value Ref Range Status   • Glucose 10/17/2017 107* 65 - 99 mg/dL Final   • BUN 10/17/2017 18  8 - 23 mg/dL Final   • Creatinine 10/17/2017 1.12* 0.57 - 1.00 mg/dL Final   • eGFR Non African Am 10/17/2017 49* >60 mL/min/1.73 Final   • eGFR African Am 10/17/2017 59* >60 mL/min/1.73 Final   • BUN/Creatinine Ratio 10/17/2017 16.1  7.0 - 25.0 Final   • Sodium 10/17/2017 142  136 - 145 mmol/L Final   • Potassium 10/17/2017 4.8  3.5 - 5.2 mmol/L Final   • Chloride 10/17/2017 102  98 - 107 mmol/L Final   • Total CO2 10/17/2017 25.6  22.0 - 29.0 mmol/L Final   • Calcium 10/17/2017 10.4  8.6 - 10.5 mg/dL Final   • Total Protein 10/17/2017 7.3  6.0 - 8.5 g/dL Final   • Albumin 10/17/2017 5.10  3.50 - 5.20 g/dL Final   • Globulin 10/17/2017 2.2  gm/dL Final   • A/G Ratio 10/17/2017 2.3  g/dL Final   • Total Bilirubin 10/17/2017 0.4  0.1 - 1.2 mg/dL Final   • Alkaline Phosphatase 10/17/2017 50  39 - 117 U/L Final   • AST (SGOT) 10/17/2017 26  1 - 32 U/L Final   • ALT (SGPT) 10/17/2017 27  1 - 33 U/L Final   • Total Cholesterol 10/17/2017 213* 0 - 200 mg/dL Final   • Triglycerides 10/17/2017 158* 0 - 150 mg/dL Final   • HDL Cholesterol 10/17/2017 48  40 - 60 mg/dL Final   • VLDL Cholesterol 10/17/2017 31.6  5 - 40 mg/dL Final   • LDL Cholesterol  10/17/2017 133* 0 - 100 mg/dL Final   • LDL/HDL Ratio 10/17/2017 2.78   Final     Current Outpatient Prescriptions:   •  aspirin 81 MG tablet, Take 1 tablet by mouth daily., Disp: , Rfl:   •  cetirizine (ZyrTEC) 10 MG tablet, Take 1 tablet by mouth daily., Disp: , Rfl:   •  Cholecalciferol (VITAMIN D3) 2000 UNITS capsule, Take 1 capsule by mouth daily., Disp: , Rfl:   •  clindamycin  (CLEOCIN T) 1 % lotion, Apply 1 application topically 2 (Two) Times a Day., Disp: 60 mL, Rfl: 1  •  esomeprazole (nexIUM) 40 MG capsule, Take 1 capsule by mouth Every Morning Before Breakfast., Disp: 90 capsule, Rfl: 3  •  fenofibrate micronized (ANTARA) 130 MG capsule, Take 1 capsule by mouth Every Morning Before Breakfast., Disp: 90 capsule, Rfl: 3  •  lisinopril (PRINIVIL,ZESTRIL) 20 MG tablet, Take 1 tablet by mouth Daily., Disp: 90 tablet, Rfl: 3  •  meloxicam (MOBIC) 7.5 MG tablet, Take 1 tablet by mouth Daily. Take with food, Disp: 30 tablet, Rfl: 2  •  rosuvastatin (CRESTOR) 40 MG tablet, Take 1 tablet by mouth Every Night., Disp: 30 tablet, Rfl: 3  •  SYNTHROID 25 MCG tablet, Take 1 tablet by mouth Daily., Disp: 90 tablet, Rfl: 3  •  vilazodone (VIIBRYD) 40 MG tablet tablet, Take 1 tablet by mouth Daily., Disp: 90 tablet, Rfl: 1  •  YUVAFEM 10 MCG tablet vaginal tablet, Insert 1 tablet into the vagina 2 (Two) Times a Week., Disp: 24 tablet, Rfl: 1     Assessment/Plan   Dian Mendoza was seen today for hypertension and hypothyroidism.    Diagnoses and all orders for this visit:    Hyperlipidemia, unspecified hyperlipidemia type    Essential hypertension    VASQUEZ (dyspnea on exertion)    Bilateral hip pain      1.  Dyspnea on exertion: Patient also had an episode of chest tightness earlier this month.  Her EKG appears normal today but I do not have a baseline for comparison.  Given the fact that she does have some classical symptoms and a strong family history I am going to send her to see the cardiologist.  She likely needs a stress test.  2.  Hyperlipidemia: Patient's cholesterol is still a little bit high but much better on the Crestor  3.  Hypertension: Blood pressure is well-controlled with the lisinopril  4.  Bilateral hip pain: I'm going to go ahead and get some x-rays on her hips.  She may need to see the orthopedist but we will have to have her heart evaluated first

## 2017-11-07 DIAGNOSIS — E78.5 HYPERLIPIDEMIA, UNSPECIFIED HYPERLIPIDEMIA TYPE: ICD-10-CM

## 2017-11-07 RX ORDER — ROSUVASTATIN CALCIUM 40 MG/1
TABLET, COATED ORAL
Qty: 30 TABLET | Refills: 5 | Status: SHIPPED | OUTPATIENT
Start: 2017-11-07 | End: 2018-04-20 | Stop reason: SDUPTHER

## 2017-11-16 ENCOUNTER — OFFICE VISIT (OUTPATIENT)
Dept: CARDIOLOGY | Facility: CLINIC | Age: 67
End: 2017-11-16

## 2017-11-16 VITALS
HEIGHT: 65 IN | HEART RATE: 75 BPM | WEIGHT: 199 LBS | DIASTOLIC BLOOD PRESSURE: 80 MMHG | BODY MASS INDEX: 33.15 KG/M2 | SYSTOLIC BLOOD PRESSURE: 118 MMHG

## 2017-11-16 DIAGNOSIS — R07.2 PRECORDIAL PAIN: Primary | ICD-10-CM

## 2017-11-16 DIAGNOSIS — E78.5 HYPERLIPIDEMIA, UNSPECIFIED HYPERLIPIDEMIA TYPE: ICD-10-CM

## 2017-11-16 DIAGNOSIS — I10 ESSENTIAL HYPERTENSION: ICD-10-CM

## 2017-11-16 PROCEDURE — 99203 OFFICE O/P NEW LOW 30 MIN: CPT | Performed by: INTERNAL MEDICINE

## 2017-11-16 PROCEDURE — 93000 ELECTROCARDIOGRAM COMPLETE: CPT | Performed by: INTERNAL MEDICINE

## 2017-11-16 NOTE — PROGRESS NOTES
Date of Office Visit: 2017  Encounter Provider: Bernard Oh MD  Place of Service: [facility] CARDIOLOGY  Patient Name: Dian Jefferson  :1950  Mallika Stewart MD    Chief Complaint   Patient presents with   • Chest Pain     History of Present Illness    The patient is a 67-year-old white female that was referred by Dr. Stewart for evaluation of chest discomfort.    The patient reports one episode of chest discomfort in October.  She was on vacation and just lounging around the hotel when she described an aching sensation that was associated with shortness of breath and radiation to the back.  The patient had not been doing any physical activity prior to the episode.  It lasted for about 5-6 minutes and then dissipated.  Since that time there have not been any further episodes.    The patient has been evaluated over the past few years for chest pain.  More than 10 years ago she had a stress test for similar episode and it was normal.  She was actually seen at [facility] in  with a similar episode and again had a follow-up stress test that was normal.    With respect to her daily activities she reports no chest discomfort at all.  She does have some shortness of breath with exertion but her exertion has only increased since April.  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  Since April and her group home she has actually lost about 16 pounds and is now starting to walk on a regular basis.  It is toward the end of her walk that she has had chest discomfort.    Past Medical History:   Diagnosis Date   • Anxiety    • Colon polyp    • GERD (gastroesophageal reflux disease)    • HLD (hyperlipidemia)    • Hot flash, menopausal    • HTN (hypertension)    • Hypothyroidism    • Mammogram abnormal    • Myalgia    • Vitamin D deficiency          Past Surgical History:   Procedure Laterality Date   • BREAST SURGERY   1980    ENLARGEMENT   • COLONOSCOPY     • TONSILLECTOMY     • TUBAL ABDOMINAL LIGATION             Current Outpatient Prescriptions:   •  aspirin 81 MG tablet, Take 1 tablet by mouth daily., Disp: , Rfl:   •  cetirizine (ZyrTEC) 10 MG tablet, Take 1 tablet by mouth daily., Disp: , Rfl:   •  Cholecalciferol (VITAMIN D3) 2000 UNITS capsule, Take 1 capsule by mouth daily., Disp: , Rfl:   •  clindamycin (CLEOCIN T) 1 % lotion, Apply 1 application topically 2 (Two) Times a Day., Disp: 60 mL, Rfl: 1  •  esomeprazole (nexIUM) 40 MG capsule, Take 1 capsule by mouth Every Morning Before Breakfast., Disp: 90 capsule, Rfl: 3  •  fenofibrate micronized (ANTARA) 130 MG capsule, Take 1 capsule by mouth Every Morning Before Breakfast., Disp: 90 capsule, Rfl: 3  •  lisinopril (PRINIVIL,ZESTRIL) 20 MG tablet, Take 1 tablet by mouth Daily., Disp: 90 tablet, Rfl: 3  •  meloxicam (MOBIC) 7.5 MG tablet, Take 1 tablet by mouth Daily. Take with food, Disp: 30 tablet, Rfl: 2  •  rosuvastatin (CRESTOR) 40 MG tablet, TAKE 1 TABLET BY MOUTH EVERY NIGHT, Disp: 30 tablet, Rfl: 5  •  SYNTHROID 25 MCG tablet, Take 1 tablet by mouth Daily., Disp: 90 tablet, Rfl: 3  •  vilazodone (VIIBRYD) 40 MG tablet tablet, Take 1 tablet by mouth Daily., Disp: 90 tablet, Rfl: 1  •  YUVAFEM 10 MCG tablet vaginal tablet, Insert 1 tablet into the vagina 2 (Two) Times a Week., Disp: 24 tablet, Rfl: 1      Social History     Social History   • Marital status:      Spouse name: Modesto Jefferson   • Number of children: 2   • Years of education: N/A     Occupational History   • , retired      Social History Main Topics   • Smoking status: Never Smoker   • Smokeless tobacco: Never Used   • Alcohol use Yes      Comment: OCC. USE   • Drug use: No   • Sexual activity: Not on file     Other Topics Concern   • Not on file     Social History Narrative         Review of Systems   Constitution: Negative.   HENT: Negative.    Eyes: Negative.    Cardiovascular:  "Positive for chest pain and dyspnea on exertion.   Respiratory: Negative.    Endocrine: Negative.    Skin: Negative.    Musculoskeletal: Negative.    Gastrointestinal: Negative.    Neurological: Negative.    Psychiatric/Behavioral: Negative.        Procedures      ECG 12 Lead  Date/Time: 11/16/2017 2:32 PM  Performed by: BOOGIE UNGER  Authorized by: BOOGIE UNGER   Comparison: compared with previous ECG from 6/1/2011  Similar to previous ECG  Rhythm: sinus rhythm  Rate: normal  QRS axis: normal  Comments: Nonspecific ST-T wave flattening               Objective:    /80  Pulse 75  Ht 65\" (165.1 cm)  Wt 199 lb (90.3 kg)  BMI 33.12 kg/m2        Physical Exam   Constitutional: She is oriented to person, place, and time. She appears well-developed and well-nourished.   HENT:   Head: Normocephalic.   Eyes: Pupils are equal, round, and reactive to light.   Neck: Normal range of motion. No JVD present. Carotid bruit is not present. No thyromegaly present.   Cardiovascular: Normal rate, regular rhythm, S1 normal, S2 normal, normal heart sounds and intact distal pulses.  Exam reveals no gallop and no friction rub.    No murmur heard.  Pulmonary/Chest: Effort normal and breath sounds normal.   Abdominal: Soft. Bowel sounds are normal.   Musculoskeletal: She exhibits no edema.   Neurological: She is alert and oriented to person, place, and time.   Skin: Skin is warm, dry and intact. No erythema.   Psychiatric: She has a normal mood and affect.   Vitals reviewed.          Assessment:       Diagnosis Plan   1. Precordial pain     2. Essential hypertension     3. Hyperlipidemia, unspecified hyperlipidemia type         The precordial pain certainly has some features of angina pectoris.  However the patient had only one episode and it was at rest.  She has been evaluated on several occasions over the past 10-12 years for similar events that have proven to be unremarkable.  At this point since she has not had " any recurrence and is walking on a regular basis without symptoms other than some shortness of breath at the end of her activity I don't think any further testing is indicated.  There is no change in her electrocardiogram from 2011.  I did remark to her that if her symptoms occur with walking or she starts to have more episodes and of course she should not ignore this and bring it to our attention.     Plan:       I appreciate the opportunity to evaluate this patient in consultation.

## 2017-12-04 ENCOUNTER — TELEPHONE (OUTPATIENT)
Dept: INTERNAL MEDICINE | Facility: CLINIC | Age: 67
End: 2017-12-04

## 2017-12-04 RX ORDER — OMEPRAZOLE 40 MG/1
40 CAPSULE, DELAYED RELEASE ORAL DAILY
Qty: 90 CAPSULE | Refills: 0 | Status: SHIPPED | OUTPATIENT
Start: 2017-12-04 | End: 2018-01-31 | Stop reason: SDUPTHER

## 2017-12-04 NOTE — TELEPHONE ENCOUNTER
----- Message from Mallika Stewart MD sent at 12/4/2017  7:21 AM EST -----  Ok  omeprazole 40  ----- Message -----     From: Charo Ibarra MA     Sent: 12/1/2017   2:02 PM       To: Mallika Stewart MD        ----- Message -----     From: Fely Moya     Sent: 12/1/2017   1:58 PM       To: Charo Ibarra MA    Patients new insurance will not pay for nexium starting at the first of the year and she wants to know if Dr. Stewart would change it to omeprazole. Patient would like for you to call her when a decision is made

## 2018-01-22 RX ORDER — MELOXICAM 7.5 MG/1
7.5 TABLET ORAL DAILY
Qty: 30 TABLET | Refills: 0 | Status: SHIPPED | OUTPATIENT
Start: 2018-01-22 | End: 2018-02-15 | Stop reason: SDUPTHER

## 2018-01-25 ENCOUNTER — OFFICE VISIT (OUTPATIENT)
Dept: INTERNAL MEDICINE | Facility: CLINIC | Age: 68
End: 2018-01-25

## 2018-01-25 VITALS
DIASTOLIC BLOOD PRESSURE: 82 MMHG | HEIGHT: 66 IN | BODY MASS INDEX: 31.5 KG/M2 | SYSTOLIC BLOOD PRESSURE: 130 MMHG | WEIGHT: 196 LBS | OXYGEN SATURATION: 97 % | TEMPERATURE: 98.9 F | HEART RATE: 80 BPM

## 2018-01-25 DIAGNOSIS — N64.4 BREAST PAIN, LEFT: Primary | ICD-10-CM

## 2018-01-25 PROCEDURE — 99213 OFFICE O/P EST LOW 20 MIN: CPT | Performed by: INTERNAL MEDICINE

## 2018-01-25 NOTE — PROGRESS NOTES
Subjective   Dian Jefferson is a 68 y.o. female. Patient complains of numbness and tingling in left breast.     History of Present Illness   Pt has been having some left breast tenderness for the past few months.  She has not felt any masses    The following portions of the patient's history were reviewed and updated as appropriate: allergies, current medications, past medical history, past social history and problem list.    Review of Systems   All other systems reviewed and are negative.      Objective   Physical Exam   Constitutional: She is oriented to person, place, and time. She appears well-developed and well-nourished.   HENT:   Head: Normocephalic and atraumatic.   Right Ear: External ear normal.   Left Ear: External ear normal.   Mouth/Throat: Oropharynx is clear and moist.   Eyes: Conjunctivae and EOM are normal. Pupils are equal, round, and reactive to light.   Neck: Normal range of motion. No tracheal deviation present. No thyromegaly present.   Cardiovascular: Normal rate, regular rhythm, normal heart sounds and intact distal pulses.    Pulmonary/Chest: Effort normal and breath sounds normal.   Abdominal: Soft. Bowel sounds are normal. She exhibits no distension. There is no tenderness.   Musculoskeletal: Normal range of motion. She exhibits no edema or deformity.   Neurological: She is alert and oriented to person, place, and time.   Skin: Skin is warm and dry.   Psychiatric: She has a normal mood and affect. Her behavior is normal. Judgment and thought content normal.   Vitals reviewed.      Assessment/Plan   Dian Mendoza was seen today for breast pain.    Diagnoses and all orders for this visit:    Breast pain, left  -     US breast left complete    1.  Left breast pain-  Pt is concerned she has a rupture.  We will get an US and further eval then likely refer to breast/plastic surgeon

## 2018-01-29 ENCOUNTER — TELEPHONE (OUTPATIENT)
Dept: INTERNAL MEDICINE | Facility: CLINIC | Age: 68
End: 2018-01-29

## 2018-01-29 DIAGNOSIS — N64.4 PAIN OF LEFT BREAST: Primary | ICD-10-CM

## 2018-01-29 PROCEDURE — 77065 DX MAMMO INCL CAD UNI: CPT | Performed by: INTERNAL MEDICINE

## 2018-01-29 NOTE — TELEPHONE ENCOUNTER
----- Message from Fely Moya sent at 1/26/2018  9:31 AM EST -----  HOSPITAL IS CALLING STATING PATIENT NEEDS AN ORDER FOR A LEFT SIDE DIAGNOSTIC MAMMOGRAM

## 2018-01-31 RX ORDER — OMEPRAZOLE 40 MG/1
40 CAPSULE, DELAYED RELEASE ORAL DAILY
Qty: 90 CAPSULE | Refills: 0 | Status: SHIPPED | OUTPATIENT
Start: 2018-01-31 | End: 2018-06-25 | Stop reason: SDUPTHER

## 2018-02-01 ENCOUNTER — APPOINTMENT (OUTPATIENT)
Dept: WOMENS IMAGING | Facility: HOSPITAL | Age: 68
End: 2018-02-01

## 2018-02-01 PROCEDURE — MDREVIEWSP: Performed by: RADIOLOGY

## 2018-02-01 PROCEDURE — 76641 ULTRASOUND BREAST COMPLETE: CPT | Performed by: RADIOLOGY

## 2018-02-01 PROCEDURE — G0279 TOMOSYNTHESIS, MAMMO: HCPCS | Performed by: RADIOLOGY

## 2018-02-01 PROCEDURE — 77065 DX MAMMO INCL CAD UNI: CPT | Performed by: RADIOLOGY

## 2018-02-15 RX ORDER — MELOXICAM 7.5 MG/1
TABLET ORAL
Qty: 30 TABLET | Refills: 0 | Status: SHIPPED | OUTPATIENT
Start: 2018-02-15 | End: 2018-04-20 | Stop reason: SDUPTHER

## 2018-04-18 LAB
ALBUMIN SERPL-MCNC: 4.8 G/DL (ref 3.5–5.2)
ALBUMIN/GLOB SERPL: 1.9 G/DL
ALP SERPL-CCNC: 43 U/L (ref 39–117)
ALT SERPL-CCNC: 29 U/L (ref 1–33)
AST SERPL-CCNC: 25 U/L (ref 1–32)
BILIRUB SERPL-MCNC: 0.4 MG/DL (ref 0.1–1.2)
BUN SERPL-MCNC: 19 MG/DL (ref 8–23)
BUN/CREAT SERPL: 18.8 (ref 7–25)
CALCIUM SERPL-MCNC: 9.7 MG/DL (ref 8.6–10.5)
CHLORIDE SERPL-SCNC: 103 MMOL/L (ref 98–107)
CHOLEST SERPL-MCNC: 265 MG/DL (ref 0–200)
CO2 SERPL-SCNC: 23.4 MMOL/L (ref 22–29)
CREAT SERPL-MCNC: 1.01 MG/DL (ref 0.57–1)
GFR SERPLBLD CREATININE-BSD FMLA CKD-EPI: 55 ML/MIN/1.73
GFR SERPLBLD CREATININE-BSD FMLA CKD-EPI: 66 ML/MIN/1.73
GLOBULIN SER CALC-MCNC: 2.5 GM/DL
GLUCOSE SERPL-MCNC: 99 MG/DL (ref 65–99)
HDLC SERPL-MCNC: 54 MG/DL (ref 40–60)
LDLC SERPL CALC-MCNC: 179 MG/DL (ref 0–100)
LDLC/HDLC SERPL: 3.32 {RATIO}
POTASSIUM SERPL-SCNC: 4.3 MMOL/L (ref 3.5–5.2)
PROT SERPL-MCNC: 7.3 G/DL (ref 6–8.5)
SODIUM SERPL-SCNC: 142 MMOL/L (ref 136–145)
TRIGL SERPL-MCNC: 159 MG/DL (ref 0–150)
TSH SERPL DL<=0.005 MIU/L-ACNC: 2.53 MIU/ML (ref 0.27–4.2)
VLDLC SERPL CALC-MCNC: 31.8 MG/DL (ref 5–40)

## 2018-04-19 ENCOUNTER — OFFICE VISIT (OUTPATIENT)
Dept: INTERNAL MEDICINE | Facility: CLINIC | Age: 68
End: 2018-04-19

## 2018-04-19 ENCOUNTER — RESULTS ENCOUNTER (OUTPATIENT)
Dept: INTERNAL MEDICINE | Facility: CLINIC | Age: 68
End: 2018-04-19

## 2018-04-19 VITALS
BODY MASS INDEX: 32.09 KG/M2 | OXYGEN SATURATION: 97 % | HEIGHT: 65 IN | WEIGHT: 192.6 LBS | HEART RATE: 83 BPM | SYSTOLIC BLOOD PRESSURE: 112 MMHG | DIASTOLIC BLOOD PRESSURE: 76 MMHG | TEMPERATURE: 98.1 F

## 2018-04-19 DIAGNOSIS — K21.9 GASTROESOPHAGEAL REFLUX DISEASE, ESOPHAGITIS PRESENCE NOT SPECIFIED: ICD-10-CM

## 2018-04-19 DIAGNOSIS — I10 ESSENTIAL HYPERTENSION: ICD-10-CM

## 2018-04-19 DIAGNOSIS — I10 ESSENTIAL HYPERTENSION: Primary | ICD-10-CM

## 2018-04-19 DIAGNOSIS — E78.5 HYPERLIPIDEMIA, UNSPECIFIED HYPERLIPIDEMIA TYPE: ICD-10-CM

## 2018-04-19 PROCEDURE — 99213 OFFICE O/P EST LOW 20 MIN: CPT | Performed by: INTERNAL MEDICINE

## 2018-04-19 NOTE — PROGRESS NOTES
Subjective   Dian Jefferson is a 68 y.o. female here today to f/u on hyperlipidemia and HTN.      History of Present Illness   Pt has been taking cholesterol meds as prescribed.  No difficulties with myalgias.   Pt has been taking BP meds as prescribed without any problems.  No HA  No episodes of orthostasis  Pt has been compliant with meds for GERD.  No sx as long as pt takes medicine as prescribed.  No epigastric pain or reflux sx      The following portions of the patient's history were reviewed and updated as appropriate: allergies, current medications, past medical history, past social history and problem list.    Review of Systems    Objective   Physical Exam   Constitutional: She is oriented to person, place, and time. She appears well-developed and well-nourished.   HENT:   Head: Normocephalic and atraumatic.   Right Ear: External ear normal.   Left Ear: External ear normal.   Mouth/Throat: Oropharynx is clear and moist.   Eyes: Conjunctivae and EOM are normal. Pupils are equal, round, and reactive to light.   Neck: Normal range of motion. No tracheal deviation present. No thyromegaly present.   Cardiovascular: Normal rate, regular rhythm, normal heart sounds and intact distal pulses.    Pulmonary/Chest: Effort normal and breath sounds normal.   Abdominal: Soft. Bowel sounds are normal. She exhibits no distension. There is no tenderness.   Musculoskeletal: Normal range of motion. She exhibits no edema or deformity.   Neurological: She is alert and oriented to person, place, and time.   Skin: Skin is warm and dry.   Psychiatric: She has a normal mood and affect. Her behavior is normal. Judgment and thought content normal.   Vitals reviewed.      Vitals:    04/19/18 1545   BP: 112/76   Pulse: 83   Temp: 98.1 °F (36.7 °C)   SpO2: 97%       Current Outpatient Prescriptions:   •  aspirin 81 MG tablet, Take 1 tablet by mouth daily., Disp: , Rfl:   •  cetirizine (ZyrTEC) 10 MG tablet, Take 1 tablet by mouth  daily., Disp: , Rfl:   •  Cholecalciferol (VITAMIN D3) 2000 UNITS capsule, Take 1 capsule by mouth daily., Disp: , Rfl:   •  clindamycin (CLEOCIN T) 1 % lotion, Apply 1 application topically 2 (Two) Times a Day., Disp: 60 mL, Rfl: 1  •  fenofibrate micronized (ANTARA) 130 MG capsule, Take 1 capsule by mouth Every Morning Before Breakfast., Disp: 90 capsule, Rfl: 3  •  lisinopril (PRINIVIL,ZESTRIL) 20 MG tablet, Take 1 tablet by mouth Daily., Disp: 90 tablet, Rfl: 3  •  meloxicam (MOBIC) 7.5 MG tablet, TAKE ONE TABLET BY MOUTH DAILY WITH FOOD, Disp: 30 tablet, Rfl: 0  •  omeprazole (priLOSEC) 40 MG capsule, Take 1 capsule by mouth Daily for 90 days., Disp: 90 capsule, Rfl: 0  •  rosuvastatin (CRESTOR) 40 MG tablet, TAKE 1 TABLET BY MOUTH EVERY NIGHT, Disp: 30 tablet, Rfl: 5  •  SYNTHROID 25 MCG tablet, Take 1 tablet by mouth Daily., Disp: 90 tablet, Rfl: 3  •  vilazodone (VIIBRYD) 40 MG tablet tablet, Take 1 tablet by mouth Daily., Disp: 90 tablet, Rfl: 1  •  YUVAFEM 10 MCG tablet vaginal tablet, Insert 1 tablet into the vagina 2 (Two) Times a Week., Disp: 24 tablet, Rfl: 1       Assessment/Plan   Diagnoses and all orders for this visit:    Essential hypertension  -     Comprehensive Metabolic Panel; Future  -     LP+LDL / HDL Ratio ([facility]); Future  -     TSH Rfx On Abnormal To Free T4; Future  -     CBC & Differential; Future    Hyperlipidemia, unspecified hyperlipidemia type  -     Comprehensive Metabolic Panel; Future  -     LP+LDL / HDL Ratio ([facility]); Future  -     TSH Rfx On Abnormal To Free T4; Future  -     CBC & Differential; Future    Gastroesophageal reflux disease, esophagitis presence not specified  -     Comprehensive Metabolic Panel; Future  -     LP+LDL / HDL Ratio ([facility]); Future  -     TSH Rfx On Abnormal To Free T4; Future  -     CBC & Differential; Future      1.  HPL- she thinks the pharmacy gave her the wrong statin  She is going to call back with the medicine on her bottle  2.  HTN- ok  with current  3. GERD-  She is doing well with current meds

## 2018-04-20 DIAGNOSIS — E78.5 HYPERLIPIDEMIA, UNSPECIFIED HYPERLIPIDEMIA TYPE: ICD-10-CM

## 2018-04-20 RX ORDER — MELOXICAM 7.5 MG/1
7.5 TABLET ORAL DAILY
Qty: 90 TABLET | Refills: 1 | Status: SHIPPED | OUTPATIENT
Start: 2018-04-20 | End: 2018-11-21 | Stop reason: SDUPTHER

## 2018-04-20 RX ORDER — ROSUVASTATIN CALCIUM 40 MG/1
40 TABLET, COATED ORAL NIGHTLY
Qty: 90 TABLET | Refills: 1 | Status: SHIPPED | OUTPATIENT
Start: 2018-04-20 | End: 2018-11-21 | Stop reason: SDUPTHER

## 2018-06-25 RX ORDER — OMEPRAZOLE 40 MG/1
CAPSULE, DELAYED RELEASE ORAL
Qty: 90 CAPSULE | Refills: 1 | Status: SHIPPED | OUTPATIENT
Start: 2018-06-25 | End: 2018-10-02 | Stop reason: SDUPTHER

## 2018-07-25 ENCOUNTER — TELEPHONE (OUTPATIENT)
Dept: INTERNAL MEDICINE | Facility: CLINIC | Age: 68
End: 2018-07-25

## 2018-07-25 DIAGNOSIS — M54.30 SCIATICA, UNSPECIFIED LATERALITY: Primary | ICD-10-CM

## 2018-07-25 NOTE — TELEPHONE ENCOUNTER
REFERRAL ORDERED    ----- Message from Mallika Stewart MD sent at 7/25/2018  2:20 PM EDT -----  ok  ----- Message -----  From: Charo Ibarra MA  Sent: 7/25/2018   1:28 PM  To: Mallika Stewart MD    IS THIS OK TO ORDER  ----- Message -----  From: Maria T Chapa  Sent: 7/25/2018   1:20 PM  To: Charo Ibarra MA    Pt is having sciatica for a week now. Her  goes to [facility]. They gave her a free evaluation to day and told her that her L2 and L3 is causing the sciatica. He said he can get her in tomorrow but needs an order.   [facility] Fax [phone]  Patient would like a call back ASAP so she may inform the therapist that she will be there tomorrrow.  Pt phone: [phone]

## 2018-07-27 ENCOUNTER — TELEPHONE (OUTPATIENT)
Dept: INTERNAL MEDICINE | Facility: CLINIC | Age: 68
End: 2018-07-27

## 2018-07-27 NOTE — TELEPHONE ENCOUNTER
----- Message from Terry Santiago sent at 7/27/2018  9:27 AM EDT -----  Sorry just saw this today.  This was faxed on Wednesday.  Hopefully, everything is good.      ----- Message -----  From: Charo Ibarra MA  Sent: 7/25/2018   3:12 PM  To: Terry Santiago    PLEASE LET PT KNOW WHEN THE REFERRAL IS FAXED. THANKS

## 2018-08-13 ENCOUNTER — TELEPHONE (OUTPATIENT)
Dept: INTERNAL MEDICINE | Facility: CLINIC | Age: 68
End: 2018-08-13

## 2018-08-13 RX ORDER — VILAZODONE HYDROCHLORIDE 40 MG/1
40 TABLET ORAL DAILY
Qty: 90 TABLET | Refills: 1 | Status: SHIPPED | OUTPATIENT
Start: 2018-08-13 | End: 2018-11-21 | Stop reason: SDUPTHER

## 2018-08-13 NOTE — TELEPHONE ENCOUNTER
RX SENT TO PHARMACY    ----- Message from Terry Del Castillo Rep sent at 8/10/2018 12:48 PM EDT -----  Pt is requesting a refill on     vilazodone (VIIBRYD) 40 MG tablet tablet    [facility]:[phone] (Phone)

## 2018-09-24 RX ORDER — FENOFIBRATE 130 MG/1
CAPSULE ORAL
Qty: 90 CAPSULE | Refills: 1 | Status: SHIPPED | OUTPATIENT
Start: 2018-09-24 | End: 2018-11-21 | Stop reason: SDUPTHER

## 2018-10-03 RX ORDER — LEVOTHYROXINE SODIUM 25 MCG
TABLET ORAL
Qty: 90 TABLET | Refills: 0 | Status: SHIPPED | OUTPATIENT
Start: 2018-10-03 | End: 2018-11-21 | Stop reason: SDUPTHER

## 2018-10-03 RX ORDER — OMEPRAZOLE 40 MG/1
CAPSULE, DELAYED RELEASE ORAL
Qty: 90 CAPSULE | Refills: 0 | Status: SHIPPED | OUTPATIENT
Start: 2018-10-03 | End: 2018-11-21 | Stop reason: SDUPTHER

## 2018-10-12 ENCOUNTER — TELEPHONE (OUTPATIENT)
Dept: INTERNAL MEDICINE | Facility: CLINIC | Age: 68
End: 2018-10-12

## 2018-10-12 RX ORDER — LISINOPRIL 20 MG/1
20 TABLET ORAL DAILY
Qty: 7 TABLET | Refills: 0 | Status: SHIPPED | OUTPATIENT
Start: 2018-10-12 | End: 2018-11-12 | Stop reason: SDUPTHER

## 2018-10-12 NOTE — TELEPHONE ENCOUNTER
RX SENT TO PHARMACY    ----- Message from Terry Del Castillo Rep sent at 10/12/2018  4:20 PM EDT -----  Pt is requesting 1 week of lisinopril (PRINIVIL,ZESTRIL) 20 MG tablet  While she is on vacation.     Then she will need a new refill of it when she returns     [facility]: [phone]  Pt Phone:[phone]

## 2018-10-19 ENCOUNTER — RESULTS ENCOUNTER (OUTPATIENT)
Dept: INTERNAL MEDICINE | Facility: CLINIC | Age: 68
End: 2018-10-19

## 2018-10-19 DIAGNOSIS — E78.5 HYPERLIPIDEMIA, UNSPECIFIED HYPERLIPIDEMIA TYPE: ICD-10-CM

## 2018-10-19 DIAGNOSIS — I10 ESSENTIAL HYPERTENSION: ICD-10-CM

## 2018-10-19 DIAGNOSIS — K21.9 GASTROESOPHAGEAL REFLUX DISEASE, ESOPHAGITIS PRESENCE NOT SPECIFIED: ICD-10-CM

## 2018-10-27 LAB
ALBUMIN SERPL-MCNC: 5.1 G/DL (ref 3.5–5.2)
ALBUMIN/GLOB SERPL: 2.2 G/DL
ALP SERPL-CCNC: 45 U/L (ref 39–117)
ALT SERPL-CCNC: 20 U/L (ref 1–33)
AST SERPL-CCNC: 20 U/L (ref 1–32)
BASOPHILS # BLD AUTO: 0.02 10*3/MM3 (ref 0–0.2)
BASOPHILS NFR BLD AUTO: 0.4 % (ref 0–1.5)
BILIRUB SERPL-MCNC: 0.4 MG/DL (ref 0.1–1.2)
BUN SERPL-MCNC: 16 MG/DL (ref 8–23)
BUN/CREAT SERPL: 14.5 (ref 7–25)
CALCIUM SERPL-MCNC: 10.2 MG/DL (ref 8.6–10.5)
CHLORIDE SERPL-SCNC: 106 MMOL/L (ref 98–107)
CHOLEST SERPL-MCNC: 194 MG/DL (ref 0–200)
CO2 SERPL-SCNC: 25.6 MMOL/L (ref 22–29)
CREAT SERPL-MCNC: 1.1 MG/DL (ref 0.57–1)
EOSINOPHIL # BLD AUTO: 0.13 10*3/MM3 (ref 0–0.7)
EOSINOPHIL NFR BLD AUTO: 2.6 % (ref 0.3–6.2)
ERYTHROCYTE [DISTWIDTH] IN BLOOD BY AUTOMATED COUNT: 13.7 % (ref 11.7–13)
GLOBULIN SER CALC-MCNC: 2.3 GM/DL
GLUCOSE SERPL-MCNC: 99 MG/DL (ref 65–99)
HCT VFR BLD AUTO: 43.1 % (ref 35.6–45.5)
HDLC SERPL-MCNC: 51 MG/DL (ref 40–60)
HGB BLD-MCNC: 13.1 G/DL (ref 11.9–15.5)
IMM GRANULOCYTES # BLD: 0 10*3/MM3 (ref 0–0.03)
IMM GRANULOCYTES NFR BLD: 0 % (ref 0–0.5)
LDLC SERPL CALC-MCNC: 116 MG/DL (ref 0–100)
LDLC/HDLC SERPL: 2.27 {RATIO}
LYMPHOCYTES # BLD AUTO: 1.9 10*3/MM3 (ref 0.9–4.8)
LYMPHOCYTES NFR BLD AUTO: 37.9 % (ref 19.6–45.3)
MCH RBC QN AUTO: 28.3 PG (ref 26.9–32)
MCHC RBC AUTO-ENTMCNC: 30.4 G/DL (ref 32.4–36.3)
MCV RBC AUTO: 93.1 FL (ref 80.5–98.2)
MONOCYTES # BLD AUTO: 0.36 10*3/MM3 (ref 0.2–1.2)
MONOCYTES NFR BLD AUTO: 7.2 % (ref 5–12)
NEUTROPHILS # BLD AUTO: 2.6 10*3/MM3 (ref 1.9–8.1)
NEUTROPHILS NFR BLD AUTO: 51.9 % (ref 42.7–76)
PLATELET # BLD AUTO: 385 10*3/MM3 (ref 140–500)
POTASSIUM SERPL-SCNC: 5.3 MMOL/L (ref 3.5–5.2)
PROT SERPL-MCNC: 7.4 G/DL (ref 6–8.5)
RBC # BLD AUTO: 4.63 10*6/MM3 (ref 3.9–5.2)
SODIUM SERPL-SCNC: 144 MMOL/L (ref 136–145)
TRIGL SERPL-MCNC: 135 MG/DL (ref 0–150)
TSH SERPL DL<=0.005 MIU/L-ACNC: 3 MIU/ML (ref 0.27–4.2)
VLDLC SERPL CALC-MCNC: 27 MG/DL (ref 5–40)
WBC # BLD AUTO: 5.01 10*3/MM3 (ref 4.5–10.7)

## 2018-11-12 RX ORDER — LISINOPRIL 20 MG/1
TABLET ORAL
Qty: 90 TABLET | Refills: 1 | Status: SHIPPED | OUTPATIENT
Start: 2018-11-12 | End: 2018-11-21 | Stop reason: SDUPTHER

## 2018-11-21 ENCOUNTER — OFFICE VISIT (OUTPATIENT)
Dept: INTERNAL MEDICINE | Facility: CLINIC | Age: 68
End: 2018-11-21

## 2018-11-21 VITALS
BODY MASS INDEX: 30.44 KG/M2 | TEMPERATURE: 98 F | SYSTOLIC BLOOD PRESSURE: 110 MMHG | OXYGEN SATURATION: 97 % | HEART RATE: 71 BPM | DIASTOLIC BLOOD PRESSURE: 78 MMHG | HEIGHT: 66 IN | WEIGHT: 189.4 LBS

## 2018-11-21 DIAGNOSIS — E78.5 HYPERLIPIDEMIA, UNSPECIFIED HYPERLIPIDEMIA TYPE: Primary | ICD-10-CM

## 2018-11-21 DIAGNOSIS — E03.9 ACQUIRED HYPOTHYROIDISM: ICD-10-CM

## 2018-11-21 DIAGNOSIS — M54.50 CHRONIC MIDLINE LOW BACK PAIN WITHOUT SCIATICA: ICD-10-CM

## 2018-11-21 DIAGNOSIS — I10 ESSENTIAL HYPERTENSION: ICD-10-CM

## 2018-11-21 DIAGNOSIS — Z00.00 MEDICARE ANNUAL WELLNESS VISIT, SUBSEQUENT: ICD-10-CM

## 2018-11-21 DIAGNOSIS — G89.29 CHRONIC MIDLINE LOW BACK PAIN WITHOUT SCIATICA: ICD-10-CM

## 2018-11-21 PROCEDURE — 99213 OFFICE O/P EST LOW 20 MIN: CPT | Performed by: INTERNAL MEDICINE

## 2018-11-21 PROCEDURE — G0438 PPPS, INITIAL VISIT: HCPCS | Performed by: INTERNAL MEDICINE

## 2018-11-21 RX ORDER — LEVOTHYROXINE SODIUM 25 MCG
25 TABLET ORAL DAILY
Qty: 90 TABLET | Refills: 3 | Status: SHIPPED | OUTPATIENT
Start: 2018-11-21 | End: 2019-01-22 | Stop reason: SDUPTHER

## 2018-11-21 RX ORDER — OMEPRAZOLE 40 MG/1
40 CAPSULE, DELAYED RELEASE ORAL DAILY
Qty: 90 CAPSULE | Refills: 3 | Status: SHIPPED | OUTPATIENT
Start: 2018-11-21 | End: 2019-01-22 | Stop reason: SDUPTHER

## 2018-11-21 RX ORDER — VILAZODONE HYDROCHLORIDE 40 MG/1
40 TABLET ORAL DAILY
Qty: 90 TABLET | Refills: 1 | Status: SHIPPED | OUTPATIENT
Start: 2018-11-21 | End: 2019-01-22 | Stop reason: SDUPTHER

## 2018-11-21 RX ORDER — ROSUVASTATIN CALCIUM 40 MG/1
40 TABLET, COATED ORAL NIGHTLY
Qty: 90 TABLET | Refills: 3 | Status: SHIPPED | OUTPATIENT
Start: 2018-11-21 | End: 2019-01-22 | Stop reason: SDUPTHER

## 2018-11-21 RX ORDER — MELOXICAM 7.5 MG/1
7.5 TABLET ORAL DAILY
Qty: 90 TABLET | Refills: 1 | Status: SHIPPED | OUTPATIENT
Start: 2018-11-21 | End: 2019-01-22 | Stop reason: SDUPTHER

## 2018-11-21 RX ORDER — FENOFIBRATE 130 MG/1
130 CAPSULE ORAL
Qty: 90 CAPSULE | Refills: 3 | Status: SHIPPED | OUTPATIENT
Start: 2018-11-21 | End: 2019-01-22 | Stop reason: SDUPTHER

## 2018-11-21 RX ORDER — LISINOPRIL 20 MG/1
20 TABLET ORAL DAILY
Qty: 90 TABLET | Refills: 3 | Status: SHIPPED | OUTPATIENT
Start: 2018-11-21 | End: 2019-01-22 | Stop reason: SDUPTHER

## 2018-11-21 NOTE — PROGRESS NOTES
Subjective   Dian Jefferson is a 68 y.o. female here to follow up on labs and medicare wellness.     History of Present Illness   Pt has been taking BP meds as prescribed without any problems.  No HA  No episodes of orthostasis  Pt has been taking cholesterol meds as prescribed.  No difficulties with myalgias.   Pt has been doing well with thyroid meds.  Taking as perscribed without any complications  She does have some lower back pain  No radicuar sx.  Worse after standing for a long time.  She lalso also has bilat hip pain worse with walking      The following portions of the patient's history were reviewed and updated as appropriate: allergies, current medications, past medical history, past social history and problem list.  occas walking she does eat a healthy diet    Review of Systems   All other systems reviewed and are negative.      Objective   Physical Exam   Constitutional: She is oriented to person, place, and time. She appears well-developed and well-nourished.   HENT:   Head: Normocephalic and atraumatic.   Right Ear: External ear normal.   Left Ear: External ear normal.   Mouth/Throat: Oropharynx is clear and moist.   Eyes: Conjunctivae and EOM are normal. Pupils are equal, round, and reactive to light.   Neck: Normal range of motion. No tracheal deviation present. No thyromegaly present.   Cardiovascular: Normal rate, regular rhythm, normal heart sounds and intact distal pulses.   Pulmonary/Chest: Effort normal and breath sounds normal.   Abdominal: Soft. Bowel sounds are normal. She exhibits no distension. There is no tenderness.   Musculoskeletal: Normal range of motion. She exhibits no edema or deformity.   Neurological: She is alert and oriented to person, place, and time.   Skin: Skin is warm and dry.   Psychiatric: She has a normal mood and affect. Her behavior is normal. Judgment and thought content normal.   Vitals reviewed.      Vitals:    11/21/18 1309   BP: 110/78   Pulse: 71   Temp:  98 °F (36.7 °C)   SpO2: 97%     Results Encounter on 10/19/2018   Component Date Value Ref Range Status   • Glucose 10/26/2018 99  65 - 99 mg/dL Final   • BUN 10/26/2018 16  8 - 23 mg/dL Final   • Creatinine 10/26/2018 1.10* 0.57 - 1.00 mg/dL Final   • eGFR Non  Am 10/26/2018 50* >60 mL/min/1.73 Final   • eGFR African Am 10/26/2018 60* >60 mL/min/1.73 Final   • BUN/Creatinine Ratio 10/26/2018 14.5  7.0 - 25.0 Final   • Sodium 10/26/2018 144  136 - 145 mmol/L Final   • Potassium 10/26/2018 5.3* 3.5 - 5.2 mmol/L Final                      Client Requested Flag   • Chloride 10/26/2018 106  98 - 107 mmol/L Final   • Total CO2 10/26/2018 25.6  22.0 - 29.0 mmol/L Final   • Calcium 10/26/2018 10.2  8.6 - 10.5 mg/dL Final   • Total Protein 10/26/2018 7.4  6.0 - 8.5 g/dL Final   • Albumin 10/26/2018 5.10  3.50 - 5.20 g/dL Final   • Globulin 10/26/2018 2.3  gm/dL Final   • A/G Ratio 10/26/2018 2.2  g/dL Final   • Total Bilirubin 10/26/2018 0.4  0.1 - 1.2 mg/dL Final   • Alkaline Phosphatase 10/26/2018 45  39 - 117 U/L Final   • AST (SGOT) 10/26/2018 20  1 - 32 U/L Final   • ALT (SGPT) 10/26/2018 20  1 - 33 U/L Final   • Total Cholesterol 10/26/2018 194  0 - 200 mg/dL Final   • Triglycerides 10/26/2018 135  0 - 150 mg/dL Final   • HDL Cholesterol 10/26/2018 51  40 - 60 mg/dL Final   • VLDL Cholesterol 10/26/2018 27  5 - 40 mg/dL Final   • LDL Cholesterol  10/26/2018 116* 0 - 100 mg/dL Final   • LDL/HDL Ratio 10/26/2018 2.27   Final   • TSH 10/26/2018 3.00  0.27 - 4.2 mIU/mL Final   • WBC 10/26/2018 5.01  4.50 - 10.70 10*3/mm3 Final   • RBC 10/26/2018 4.63  3.90 - 5.20 10*6/mm3 Final   • Hemoglobin 10/26/2018 13.1  11.9 - 15.5 g/dL Final   • Hematocrit 10/26/2018 43.1  35.6 - 45.5 % Final   • MCV 10/26/2018 93.1  80.5 - 98.2 fL Final   • MCH 10/26/2018 28.3  26.9 - 32.0 pg Final   • MCHC 10/26/2018 30.4* 32.4 - 36.3 g/dL Final   • RDW 10/26/2018 13.7* 11.7 - 13.0 % Final   • Platelets 10/26/2018 385  140 - 500 10*3/mm3  Final   • Neutrophil Rel % 10/26/2018 51.9  42.7 - 76.0 % Final   • Lymphocyte Rel % 10/26/2018 37.9  19.6 - 45.3 % Final   • Monocyte Rel % 10/26/2018 7.2  5.0 - 12.0 % Final   • Eosinophil Rel % 10/26/2018 2.6  0.3 - 6.2 % Final   • Basophil Rel % 10/26/2018 0.4  0.0 - 1.5 % Final   • Neutrophils Absolute 10/26/2018 2.60  1.90 - 8.10 10*3/mm3 Final   • Lymphocytes Absolute 10/26/2018 1.90  0.90 - 4.80 10*3/mm3 Final   • Monocytes Absolute 10/26/2018 0.36  0.20 - 1.20 10*3/mm3 Final   • Eosinophils Absolute 10/26/2018 0.13  0.00 - 0.70 10*3/mm3 Final   • Basophils Absolute 10/26/2018 0.02  0.00 - 0.20 10*3/mm3 Final   • Immature Granulocyte Rel % 10/26/2018 0.0  0.0 - 0.5 % Final   • Immature Grans Absolute 10/26/2018 0.00  0.00 - 0.03 10*3/mm3 Final     Current Outpatient Medications:   •  aspirin 81 MG tablet, Take 1 tablet by mouth daily., Disp: , Rfl:   •  cetirizine (ZyrTEC) 10 MG tablet, Take 1 tablet by mouth daily., Disp: , Rfl:   •  Cholecalciferol (VITAMIN D3) 2000 UNITS capsule, Take 1 capsule by mouth daily., Disp: , Rfl:   •  clindamycin (CLEOCIN T) 1 % lotion, Apply 1 application topically 2 (Two) Times a Day., Disp: 60 mL, Rfl: 1  •  fenofibrate micronized (ANTARA) 130 MG capsule, TAKE ONE CAPSULE BY MOUTH EVERY MORNING BEFORE BREAKFAST, Disp: 90 capsule, Rfl: 1  •  lisinopril (PRINIVIL,ZESTRIL) 20 MG tablet, TAKE ONE TABLET BY MOUTH DAILY, Disp: 90 tablet, Rfl: 1  •  meloxicam (MOBIC) 7.5 MG tablet, Take 1 tablet by mouth Daily. with food., Disp: 90 tablet, Rfl: 1  •  omeprazole (priLOSEC) 40 MG capsule, TAKE ONE CAPSULE BY MOUTH DAILY, Disp: 90 capsule, Rfl: 0  •  rosuvastatin (CRESTOR) 40 MG tablet, Take 1 tablet by mouth Every Night., Disp: 90 tablet, Rfl: 1  •  SYNTHROID 25 MCG tablet, TAKE ONE TABLET BY MOUTH DAILY, Disp: 90 tablet, Rfl: 0  •  vilazodone (VIIBRYD) 40 MG tablet tablet, Take 1 tablet by mouth Daily., Disp: 90 tablet, Rfl: 1  •  YUVAFEM 10 MCG tablet vaginal tablet, Insert 1  tablet into the vagina 2 (Two) Times a Week., Disp: 24 tablet, Rfl: 1         Assessment/Plan   Diagnoses and all orders for this visit:    Hyperlipidemia, unspecified hyperlipidemia type    Essential hypertension    Acquired hypothyroidism    Chronic midline low back pain without sciatica    Medicare annual wellness visit, subsequent      1.  HPL- she is doing well with current meds  2. HTN- ok with current meds  3.  Hypothyrodism- ok with current dose  4. Bilat hip and LBP-  She has seen PT in the past she may do PT she will take mobic prn and makes sure she will drink plenty of water

## 2018-11-21 NOTE — PATIENT INSTRUCTIONS
Fall Prevention in the Home  Falls can cause injuries. They can happen to people of all ages. There are many things you can do to make your home safe and to help prevent falls.  What can I do on the outside of my home?  · Regularly fix the edges of walkways and driveways and fix any cracks.  · Remove anything that might make you trip as you walk through a door, such as a raised step or threshold.  · Trim any bushes or trees on the path to your home.  · Use bright outdoor lighting.  · Clear any walking paths of anything that might make someone trip, such as rocks or tools.  · Regularly check to see if handrails are loose or broken. Make sure that both sides of any steps have handrails.  · Any raised decks and porches should have guardrails on the edges.  · Have any leaves, snow, or ice cleared regularly.  · Use sand or salt on walking paths during winter.  · Clean up any spills in your garage right away. This includes oil or grease spills.  What can I do in the bathroom?  · Use night lights.  · Install grab bars by the toilet and in the tub and shower. Do not use towel bars as grab bars.  · Use non-skid mats or decals in the tub or shower.  · If you need to sit down in the shower, use a plastic, non-slip stool.  · Keep the floor dry. Clean up any water that spills on the floor as soon as it happens.  · Remove soap buildup in the tub or shower regularly.  · Attach bath mats securely with double-sided non-slip rug tape.  · Do not have throw rugs and other things on the floor that can make you trip.  What can I do in the bedroom?  · Use night lights.  · Make sure that you have a light by your bed that is easy to reach.  · Do not use any sheets or blankets that are too big for your bed. They should not hang down onto the floor.  · Have a firm chair that has side arms. You can use this for support while you get dressed.  · Do not have throw rugs and other things on the floor that can make you trip.  What can I do in the  kitchen?  · Clean up any spills right away.  · Avoid walking on wet floors.  · Keep items that you use a lot in easy-to-reach places.  · If you need to reach something above you, use a strong step stool that has a grab bar.  · Keep electrical cords out of the way.  · Do not use floor polish or wax that makes floors slippery. If you must use wax, use non-skid floor wax.  · Do not have throw rugs and other things on the floor that can make you trip.  What can I do with my stairs?  · Do not leave any items on the stairs.  · Make sure that there are handrails on both sides of the stairs and use them. Fix handrails that are broken or loose. Make sure that handrails are as long as the stairways.  · Check any carpeting to make sure that it is firmly attached to the stairs. Fix any carpet that is loose or worn.  · Avoid having throw rugs at the top or bottom of the stairs. If you do have throw rugs, attach them to the floor with carpet tape.  · Make sure that you have a light switch at the top of the stairs and the bottom of the stairs. If you do not have them, ask someone to add them for you.  What else can I do to help prevent falls?  · Wear shoes that:  ? Do not have high heels.  ? Have rubber bottoms.  ? Are comfortable and fit you well.  ? Are closed at the toe. Do not wear sandals.  · If you use a stepladder:  ? Make sure that it is fully opened. Do not climb a closed stepladder.  ? Make sure that both sides of the stepladder are locked into place.  ? Ask someone to hold it for you, if possible.  · Clearly mason and make sure that you can see:  ? Any grab bars or handrails.  ? First and last steps.  ? Where the edge of each step is.  · Use tools that help you move around (mobility aids) if they are needed. These include:  ? Canes.  ? Walkers.  ? Scooters.  ? Crutches.  · Turn on the lights when you go into a dark area. Replace any light bulbs as soon as they burn out.  · Set up your furniture so you have a clear path.  Avoid moving your furniture around.  · If any of your floors are uneven, fix them.  · If there are any pets around you, be aware of where they are.  · Review your medicines with your doctor. Some medicines can make you feel dizzy. This can increase your chance of falling.  Ask your doctor what other things that you can do to help prevent falls.  This information is not intended to replace advice given to you by your health care provider. Make sure you discuss any questions you have with your health care provider.  Document Released: 10/14/2010 Document Revised: 2017 Document Reviewed: 2016  [employer] Patient Education © 2018 [employer]    Medicare Wellness  Personal Prevention Plan of Service     Date of Office Visit:  2018  Encounter Provider:  Mallika Stewart MD  Place of Service:  [facility] INTERNAL MEDICINE  Patient Name: Dian Jefferson  :  1950    As part of the Medicare Wellness portion of your visit today, we are providing you with this personalized preventive plan of services (PPPS). This plan is based upon recommendations of the [place] Preventive Services Task Force (USPSTF) and the Advisory Committee on Immunization Practices (ACIP).    This lists the preventive care services that should be considered, and provides dates of when you are due. Items listed as completed are up-to-date and do not require any further intervention.    Health Maintenance   Topic Date Due   • ZOSTER VACCINE (2 of 2) 2017   • MEDICARE ANNUAL WELLNESS  2018   • PNEUMOCOCCAL VACCINES (65+ LOW/MEDIUM RISK) (2 of 2 - PPSV23) 2018   • INFLUENZA VACCINE  2018   • LIPID PANEL  10/26/2019   • MAMMOGRAM  2020   • COLONOSCOPY  2020   • TDAP/TD VACCINES (2 - Td) 2027   • HEPATITIS C SCREENING  Completed       No orders of the defined types were placed in this encounter.      No Follow-up on file.

## 2018-11-21 NOTE — PROGRESS NOTES
QUICK REFERENCE INFORMATION:  The ABCs of the Annual Wellness Visit    Subsequent Medicare Wellness Visit    HEALTH RISK ASSESSMENT    1950    Recent Hospitalizations:  No hospitalization(s) within the last year..        Current Medical Providers:  Patient Care Team:  Mallika Stewart MD as PCP - General (Internal Medicine)  Mallika Stewart MD as PCP - Claims Attributed  Britney Cole MD as Consulting Physician (Obstetrics and Gynecology)        Smoking Status:  Social History     Tobacco Use   Smoking Status Never Smoker   Smokeless Tobacco Never Used       Alcohol Consumption:  Social History     Substance and Sexual Activity   Alcohol Use Yes    Comment: OCC. USE       Depression Screen:   PHQ-2/PHQ-9 Depression Screening 11/21/2018   Little interest or pleasure in doing things 0   Feeling down, depressed, or hopeless 0   Trouble falling or staying asleep, or sleeping too much 0   Feeling tired or having little energy 0   Poor appetite or overeating 0   Feeling bad about yourself - or that you are a failure or have let yourself or your family down 0   Trouble concentrating on things, such as reading the newspaper or watching television 0   Moving or speaking so slowly that other people could have noticed. Or the opposite - being so fidgety or restless that you have been moving around a lot more than usual 0   Thoughts that you would be better off dead, or of hurting yourself in some way 0   Total Score 0   If you checked off any problems, how difficult have these problems made it for you to do your work, take care of things at home, or get along with other people? Not difficult at all       Health Habits and Functional and Cognitive Screening:  Functional & Cognitive Status 11/21/2018   Do you have difficulty preparing food and eating? No   Do you have difficulty bathing yourself, getting dressed or grooming yourself? No   Do you have difficulty using the toilet? No   Do you have difficulty moving around from  place to place? No   Do you have trouble with steps or getting out of a bed or a chair? No   In the past year have you fallen or experienced a near fall? No   Current Diet Limited Junk Food   Dental Exam Up to date   Eye Exam Up to date   Exercise (times per week) 3 times per week   Current Exercise Activities Include Walking   Do you need help using the phone?  No   Are you deaf or do you have serious difficulty hearing?  No   Do you need help with transportation? No   Do you need help shopping? No   Do you need help preparing meals?  No   Do you need help with housework?  No   Do you need help with laundry? No   Do you need help taking your medications? No   Do you need help managing money? No   Do you ever drive or ride in a car without wearing a seat belt? No   Have you felt unusual stress, anger or loneliness in the last month? No   Who do you live with? Spouse   If you need help, do you have trouble finding someone available to you? No   Have you been bothered in the last four weeks by sexual problems? No   Do you have difficulty concentrating, remembering or making decisions? No           Does the patient have evidence of cognitive impairment? No    Aspirin use counseling: Does not need AS but is currently taking (discussed benefits vs risks and patient elects to stay on ASA)      Recent Lab Results:  CMP:  Lab Results   Component Value Date    GLU 99 10/26/2018    BUN 16 10/26/2018    CREATININE 1.10 (H) 10/26/2018    EGFRIFNONA 50 (L) 10/26/2018    EGFRIFAFRI 60 (L) 10/26/2018    BCR 14.5 10/26/2018     10/26/2018    K 5.3 (H) 10/26/2018    CO2 25.6 10/26/2018    CALCIUM 10.2 10/26/2018    PROTENTOTREF 7.4 10/26/2018    ALBUMIN 5.10 10/26/2018    LABGLOBREF 2.3 10/26/2018    LABIL2 2.2 10/26/2018    BILITOT 0.4 10/26/2018    ALKPHOS 45 10/26/2018    AST 20 10/26/2018    ALT 20 10/26/2018     Lipid Panel:  Lab Results   Component Value Date    TRIG 135 10/26/2018    HDL 51 10/26/2018    VLDL 27  10/26/2018    LDLHDL 2.27 10/26/2018     HbA1c:  Lab Results   Component Value Date    HGBA1C 6.01 (H) 07/11/2017       Visual Acuity:  No exam data present    Age-appropriate Screening Schedule:  Refer to the list below for future screening recommendations based on patient's age, sex and/or medical conditions. Orders for these recommended tests are listed in the plan section. The patient has been provided with a written plan.    Health Maintenance   Topic Date Due   • ZOSTER VACCINE (2 of 2) 09/07/2017   • PNEUMOCOCCAL VACCINES (65+ LOW/MEDIUM RISK) (2 of 2 - PPSV23) 07/13/2018   • INFLUENZA VACCINE  08/01/2018   • LIPID PANEL  10/26/2019   • MAMMOGRAM  02/05/2020   • COLONOSCOPY  05/22/2020   • TDAP/TD VACCINES (2 - Td) 07/13/2027        Subjective   History of Present Illness    Dian Jefferson is a 68 y.o. female who presents for an Subsequent Wellness Visit.    The following portions of the patient's history were reviewed and updated as appropriate: allergies, current medications, past family history, past medical history, past social history, past surgical history and problem list.    Outpatient Medications Prior to Visit   Medication Sig Dispense Refill   • aspirin 81 MG tablet Take 1 tablet by mouth daily.     • cetirizine (ZyrTEC) 10 MG tablet Take 1 tablet by mouth daily.     • Cholecalciferol (VITAMIN D3) 2000 UNITS capsule Take 1 capsule by mouth daily.     • clindamycin (CLEOCIN T) 1 % lotion Apply 1 application topically 2 (Two) Times a Day. 60 mL 1   • fenofibrate micronized (ANTARA) 130 MG capsule TAKE ONE CAPSULE BY MOUTH EVERY MORNING BEFORE BREAKFAST 90 capsule 1   • lisinopril (PRINIVIL,ZESTRIL) 20 MG tablet TAKE ONE TABLET BY MOUTH DAILY 90 tablet 1   • meloxicam (MOBIC) 7.5 MG tablet Take 1 tablet by mouth Daily. with food. 90 tablet 1   • omeprazole (priLOSEC) 40 MG capsule TAKE ONE CAPSULE BY MOUTH DAILY 90 capsule 0   • rosuvastatin (CRESTOR) 40 MG tablet Take 1 tablet by mouth Every  "Night. 90 tablet 1   • SYNTHROID 25 MCG tablet TAKE ONE TABLET BY MOUTH DAILY 90 tablet 0   • vilazodone (VIIBRYD) 40 MG tablet tablet Take 1 tablet by mouth Daily. 90 tablet 1   • YUVAFEM 10 MCG tablet vaginal tablet Insert 1 tablet into the vagina 2 (Two) Times a Week. 24 tablet 1     No facility-administered medications prior to visit.        Patient Active Problem List   Diagnosis   • Anxiety   • Gastroesophageal reflux disease   • Menopausal flushing   • Essential hypertension   • Hyperlipidemia   • Hypothyroidism   • Abnormal mammogram   • Muscle pain   • Breast implant rupture   • Vitamin D deficiency   • Tubular adenoma of colon   • Sigmoid diverticulosis   • Internal hemorrhoids       Advance Care Planning:  has NO advance directive - information provided to the patient today    Identification of Risk Factors:  Risk factors include: cardiovascular risk and increased fall risk.    Review of Systems    Compared to one year ago, the patient feels her physical health is better.  Compared to one year ago, the patient feels her mental health is better.    Objective     Physical Exam    Vitals:    11/21/18 1309   BP: 110/78   BP Location: Right arm   Patient Position: Sitting   Cuff Size: Adult   Pulse: 71   Temp: 98 °F (36.7 °C)   TempSrc: Oral   SpO2: 97%   Weight: 85.9 kg (189 lb 6.4 oz)   Height: 167.6 cm (66\")   PainSc: 0-No pain       Patient's Body mass index is 30.57 kg/m². BMI is within normal parameters. No follow-up required.      Assessment/Plan   Patient Self-Management and Personalized Health Advice  The patient has been provided with information about: diet, exercise, the relationship between weight and GERD and fall prevention and preventive services including:   · Exercise counseling provided, Nutrition counseling provided.    Visit Diagnoses:    ICD-10-CM ICD-9-CM   1. Hyperlipidemia, unspecified hyperlipidemia type E78.5 272.4   2. Essential hypertension I10 401.9   3. Acquired hypothyroidism " E03.9 244.9   4. Chronic midline low back pain without sciatica M54.5 724.2    G89.29 338.29   5. Medicare annual wellness visit, subsequent Z00.00 V70.0       No orders of the defined types were placed in this encounter.      Outpatient Encounter Medications as of 11/21/2018   Medication Sig Dispense Refill   • aspirin 81 MG tablet Take 1 tablet by mouth daily.     • cetirizine (ZyrTEC) 10 MG tablet Take 1 tablet by mouth daily.     • Cholecalciferol (VITAMIN D3) 2000 UNITS capsule Take 1 capsule by mouth daily.     • clindamycin (CLEOCIN T) 1 % lotion Apply 1 application topically 2 (Two) Times a Day. 60 mL 1   • fenofibrate micronized (ANTARA) 130 MG capsule TAKE ONE CAPSULE BY MOUTH EVERY MORNING BEFORE BREAKFAST 90 capsule 1   • lisinopril (PRINIVIL,ZESTRIL) 20 MG tablet TAKE ONE TABLET BY MOUTH DAILY 90 tablet 1   • meloxicam (MOBIC) 7.5 MG tablet Take 1 tablet by mouth Daily. with food. 90 tablet 1   • omeprazole (priLOSEC) 40 MG capsule TAKE ONE CAPSULE BY MOUTH DAILY 90 capsule 0   • rosuvastatin (CRESTOR) 40 MG tablet Take 1 tablet by mouth Every Night. 90 tablet 1   • SYNTHROID 25 MCG tablet TAKE ONE TABLET BY MOUTH DAILY 90 tablet 0   • vilazodone (VIIBRYD) 40 MG tablet tablet Take 1 tablet by mouth Daily. 90 tablet 1   • YUVAFEM 10 MCG tablet vaginal tablet Insert 1 tablet into the vagina 2 (Two) Times a Week. 24 tablet 1     No facility-administered encounter medications on file as of 11/21/2018.        Reviewed use of high risk medication in the elderly: yes  Reviewed for potential of harmful drug interactions in the elderly: yes    Follow Up:  No Follow-up on file.     An After Visit Summary and PPPS with all of these plans were given to the patient.    I have recshingles vaccine  She is cautious on steps  I have rec balance exercise

## 2019-01-02 ENCOUNTER — OFFICE VISIT (OUTPATIENT)
Dept: INTERNAL MEDICINE | Facility: CLINIC | Age: 69
End: 2019-01-02

## 2019-01-02 VITALS
HEIGHT: 66 IN | TEMPERATURE: 98.2 F | DIASTOLIC BLOOD PRESSURE: 80 MMHG | HEART RATE: 92 BPM | BODY MASS INDEX: 30.44 KG/M2 | WEIGHT: 189.4 LBS | SYSTOLIC BLOOD PRESSURE: 118 MMHG | OXYGEN SATURATION: 99 %

## 2019-01-02 DIAGNOSIS — L02.91 ABSCESS: Primary | ICD-10-CM

## 2019-01-02 PROCEDURE — 99213 OFFICE O/P EST LOW 20 MIN: CPT | Performed by: NURSE PRACTITIONER

## 2019-01-02 RX ORDER — CEPHALEXIN 500 MG/1
500 CAPSULE ORAL 2 TIMES DAILY
Qty: 20 CAPSULE | Refills: 0 | Status: SHIPPED | OUTPATIENT
Start: 2019-01-02 | End: 2019-09-26

## 2019-01-02 NOTE — PROGRESS NOTES
Subjective   Dian Jefferson is a 68 y.o. female.     History of Present Illness   The patient is here today with c/o a molina started last Thursday. Left inner thigh. Is draining, white/puss like discharge. No fevers or chills.   The following portions of the patient's history were reviewed and updated as appropriate: allergies, current medications, past family history, past medical history, past social history, past surgical history and problem list.    Review of Systems   Constitutional: Negative.    Respiratory: Negative.    Cardiovascular: Negative.    Skin:        abscess       Objective   Physical Exam   Constitutional: She appears well-developed and well-nourished.   Neck: Normal range of motion. Neck supple. No thyromegaly present.   Cardiovascular: Normal rate, regular rhythm, normal heart sounds and intact distal pulses.   Pulmonary/Chest: Effort normal and breath sounds normal.   Skin: Skin is warm and dry.        Left upper inner thigh with hard abscess, open, draining white/yellow thick   Psychiatric: She has a normal mood and affect. Her behavior is normal. Judgment and thought content normal.       Assessment/Plan   There are no diagnoses linked to this encounter.    1. Abscess- culture sent, area expressed, cleaned with saline, bacitracin ointment applied, antibiotic sent

## 2019-01-03 ENCOUNTER — APPOINTMENT (OUTPATIENT)
Dept: WOMENS IMAGING | Facility: HOSPITAL | Age: 69
End: 2019-01-03

## 2019-01-03 PROCEDURE — 77063 BREAST TOMOSYNTHESIS BI: CPT | Performed by: RADIOLOGY

## 2019-01-03 PROCEDURE — 77067 SCR MAMMO BI INCL CAD: CPT | Performed by: RADIOLOGY

## 2019-01-06 LAB
BACTERIA SPEC AEROBE CULT: NORMAL
BACTERIA SPEC CULT: NORMAL

## 2019-01-11 ENCOUNTER — TELEPHONE (OUTPATIENT)
Dept: INTERNAL MEDICINE | Facility: CLINIC | Age: 69
End: 2019-01-11

## 2019-01-11 NOTE — TELEPHONE ENCOUNTER
No redness or drainage    Just a small hard area left  I discussed with patient what to watch for about return of infection    She will comt in next week if needed    ----- Message from David Khan sent at 1/11/2019  8:41 AM EST -----  Regarding: FW: PATIENT CALL  Contact: [phone]  Patient saw cristina on 1/2 for abcess on thigh. Cristina did a culture which came back with normal bacteria darrian. Please advise.  ----- Message -----  From: Kelly Campos RegSched Rep  Sent: 1/10/2019   2:16 PM  To: David Khan  Subject: PATIENT CALL                                     Patient saw Cristina a few days ago and she prescribed Keflex.  She said the spot is not all the way gone and is wondering if she can get a refill on it to be sure this problem is totally gone.

## 2019-01-22 ENCOUNTER — TELEPHONE (OUTPATIENT)
Dept: INTERNAL MEDICINE | Facility: CLINIC | Age: 69
End: 2019-01-22

## 2019-01-22 DIAGNOSIS — E78.5 HYPERLIPIDEMIA, UNSPECIFIED HYPERLIPIDEMIA TYPE: ICD-10-CM

## 2019-01-22 RX ORDER — MELOXICAM 7.5 MG/1
7.5 TABLET ORAL DAILY
Qty: 90 TABLET | Refills: 1 | Status: SHIPPED | OUTPATIENT
Start: 2019-01-22 | End: 2019-09-07 | Stop reason: SDUPTHER

## 2019-01-22 RX ORDER — OMEPRAZOLE 40 MG/1
40 CAPSULE, DELAYED RELEASE ORAL DAILY
Qty: 90 CAPSULE | Refills: 1 | Status: SHIPPED | OUTPATIENT
Start: 2019-01-22 | End: 2019-10-09 | Stop reason: SDUPTHER

## 2019-01-22 RX ORDER — ROSUVASTATIN CALCIUM 40 MG/1
40 TABLET, COATED ORAL NIGHTLY
Qty: 90 TABLET | Refills: 1 | Status: SHIPPED | OUTPATIENT
Start: 2019-01-22 | End: 2019-08-14 | Stop reason: SDUPTHER

## 2019-01-22 RX ORDER — FENOFIBRATE 130 MG/1
130 CAPSULE ORAL
Qty: 90 CAPSULE | Refills: 1 | Status: SHIPPED | OUTPATIENT
Start: 2019-01-22 | End: 2019-07-12 | Stop reason: SDUPTHER

## 2019-01-22 RX ORDER — VILAZODONE HYDROCHLORIDE 40 MG/1
40 TABLET ORAL DAILY
Qty: 90 TABLET | Refills: 1 | Status: SHIPPED | OUTPATIENT
Start: 2019-01-22 | End: 2019-12-11 | Stop reason: SDUPTHER

## 2019-01-22 RX ORDER — ESTRADIOL 10 UG/1
1 TABLET VAGINAL 2 TIMES WEEKLY
Qty: 24 TABLET | Refills: 1 | Status: SHIPPED | OUTPATIENT
Start: 2019-01-24

## 2019-01-22 RX ORDER — LISINOPRIL 20 MG/1
20 TABLET ORAL DAILY
Qty: 90 TABLET | Refills: 1 | Status: SHIPPED | OUTPATIENT
Start: 2019-01-22 | End: 2019-08-14 | Stop reason: SDUPTHER

## 2019-01-22 RX ORDER — LEVOTHYROXINE SODIUM 25 MCG
25 TABLET ORAL DAILY
Qty: 90 TABLET | Refills: 1 | Status: SHIPPED | OUTPATIENT
Start: 2019-01-22 | End: 2019-09-07 | Stop reason: SDUPTHER

## 2019-01-22 NOTE — TELEPHONE ENCOUNTER
RX SENT TO PHARMACY    ----- Message from Valerie Allred sent at 1/22/2019  2:46 PM EST -----  Regarding: meds refill  Patient needs all her meds sent to [facility] [address]. I have changed the pharmacy in the chart.

## 2019-05-21 ENCOUNTER — RESULTS ENCOUNTER (OUTPATIENT)
Dept: INTERNAL MEDICINE | Facility: CLINIC | Age: 69
End: 2019-05-21

## 2019-05-21 DIAGNOSIS — E03.9 ACQUIRED HYPOTHYROIDISM: ICD-10-CM

## 2019-05-21 DIAGNOSIS — E78.5 HYPERLIPIDEMIA, UNSPECIFIED HYPERLIPIDEMIA TYPE: ICD-10-CM

## 2019-05-21 DIAGNOSIS — I10 ESSENTIAL HYPERTENSION: ICD-10-CM

## 2019-06-01 LAB
ALBUMIN SERPL-MCNC: 4.5 G/DL (ref 3.5–5.2)
ALBUMIN/GLOB SERPL: 2.3 G/DL
ALP SERPL-CCNC: 42 U/L (ref 39–117)
ALT SERPL-CCNC: 18 U/L (ref 1–33)
AST SERPL-CCNC: 22 U/L (ref 1–32)
BASOPHILS # BLD AUTO: 0.04 10*3/MM3 (ref 0–0.2)
BASOPHILS NFR BLD AUTO: 0.8 % (ref 0–1.5)
BILIRUB SERPL-MCNC: 0.3 MG/DL (ref 0.2–1.2)
BUN SERPL-MCNC: 21 MG/DL (ref 8–23)
BUN/CREAT SERPL: 21.4 (ref 7–25)
CALCIUM SERPL-MCNC: 10.1 MG/DL (ref 8.6–10.5)
CHLORIDE SERPL-SCNC: 110 MMOL/L (ref 98–107)
CHOLEST SERPL-MCNC: 168 MG/DL (ref 0–200)
CO2 SERPL-SCNC: 24.1 MMOL/L (ref 22–29)
CREAT SERPL-MCNC: 0.98 MG/DL (ref 0.57–1)
EOSINOPHIL # BLD AUTO: 0.15 10*3/MM3 (ref 0–0.4)
EOSINOPHIL NFR BLD AUTO: 2.9 % (ref 0.3–6.2)
ERYTHROCYTE [DISTWIDTH] IN BLOOD BY AUTOMATED COUNT: 13.6 % (ref 12.3–15.4)
GLOBULIN SER CALC-MCNC: 2 GM/DL
GLUCOSE SERPL-MCNC: 109 MG/DL (ref 65–99)
HCT VFR BLD AUTO: 40.3 % (ref 34–46.6)
HDLC SERPL-MCNC: 52 MG/DL (ref 40–60)
HGB BLD-MCNC: 12.8 G/DL (ref 12–15.9)
IMM GRANULOCYTES # BLD AUTO: 0.02 10*3/MM3 (ref 0–0.05)
IMM GRANULOCYTES NFR BLD AUTO: 0.4 % (ref 0–0.5)
LDLC SERPL CALC-MCNC: 96 MG/DL (ref 0–100)
LDLC/HDLC SERPL: 1.85 {RATIO}
LYMPHOCYTES # BLD AUTO: 1.92 10*3/MM3 (ref 0.7–3.1)
LYMPHOCYTES NFR BLD AUTO: 36.8 % (ref 19.6–45.3)
MCH RBC QN AUTO: 29.2 PG (ref 26.6–33)
MCHC RBC AUTO-ENTMCNC: 31.8 G/DL (ref 31.5–35.7)
MCV RBC AUTO: 92 FL (ref 79–97)
MONOCYTES # BLD AUTO: 0.44 10*3/MM3 (ref 0.1–0.9)
MONOCYTES NFR BLD AUTO: 8.4 % (ref 5–12)
NEUTROPHILS # BLD AUTO: 2.65 10*3/MM3 (ref 1.7–7)
NEUTROPHILS NFR BLD AUTO: 50.7 % (ref 42.7–76)
NRBC BLD AUTO-RTO: 0 /100 WBC (ref 0–0.2)
PLATELET # BLD AUTO: 353 10*3/MM3 (ref 140–450)
POTASSIUM SERPL-SCNC: 4.9 MMOL/L (ref 3.5–5.2)
PROT SERPL-MCNC: 6.5 G/DL (ref 6–8.5)
RBC # BLD AUTO: 4.38 10*6/MM3 (ref 3.77–5.28)
SODIUM SERPL-SCNC: 145 MMOL/L (ref 136–145)
TRIGL SERPL-MCNC: 100 MG/DL (ref 0–150)
TSH SERPL DL<=0.005 MIU/L-ACNC: 2.72 MIU/ML (ref 0.27–4.2)
VLDLC SERPL CALC-MCNC: 20 MG/DL
WBC # BLD AUTO: 5.22 10*3/MM3 (ref 3.4–10.8)

## 2019-06-04 ENCOUNTER — OFFICE VISIT (OUTPATIENT)
Dept: INTERNAL MEDICINE | Facility: CLINIC | Age: 69
End: 2019-06-04

## 2019-06-04 VITALS
OXYGEN SATURATION: 98 % | WEIGHT: 192 LBS | SYSTOLIC BLOOD PRESSURE: 118 MMHG | BODY MASS INDEX: 30.86 KG/M2 | TEMPERATURE: 98.5 F | DIASTOLIC BLOOD PRESSURE: 76 MMHG | HEART RATE: 68 BPM | HEIGHT: 66 IN

## 2019-06-04 DIAGNOSIS — M19.90 OSTEOARTHRITIS, UNSPECIFIED OSTEOARTHRITIS TYPE, UNSPECIFIED SITE: ICD-10-CM

## 2019-06-04 DIAGNOSIS — K21.9 GASTROESOPHAGEAL REFLUX DISEASE, ESOPHAGITIS PRESENCE NOT SPECIFIED: ICD-10-CM

## 2019-06-04 DIAGNOSIS — R73.09 ELEVATED GLUCOSE: ICD-10-CM

## 2019-06-04 DIAGNOSIS — F41.9 ANXIETY: ICD-10-CM

## 2019-06-04 DIAGNOSIS — E78.5 HYPERLIPIDEMIA, UNSPECIFIED HYPERLIPIDEMIA TYPE: Primary | ICD-10-CM

## 2019-06-04 DIAGNOSIS — E03.9 ACQUIRED HYPOTHYROIDISM: ICD-10-CM

## 2019-06-04 DIAGNOSIS — R06.02 SOB (SHORTNESS OF BREATH): ICD-10-CM

## 2019-06-04 DIAGNOSIS — I10 ESSENTIAL HYPERTENSION: ICD-10-CM

## 2019-06-04 PROCEDURE — 99214 OFFICE O/P EST MOD 30 MIN: CPT | Performed by: INTERNAL MEDICINE

## 2019-06-04 NOTE — PROGRESS NOTES
Subjective   Dian Jefferson is a 69 y.o. female here to f/u on htn, hpl, hypothyroidism With labs.  Pt c/o left knee pain and left hip pain.    History of Present Illness   Pt has been taking cholesterol meds as prescribed.  No difficulties with myalgias.   Pt has been taking BP meds as prescribed without any problems.  No HA  No episodes of orthostasis  Pt has been compliant with meds for GERD.  No sx as long as pt takes medicine as prescribed.  No epigastric pain or reflux sx  Pt has been doing well with thyroid meds.  Taking as perscribed without any complications  SHe has been on viibryd for depression  She thinks she is doing  She has been having some left knee and hip pain when she is on her knee feet a lot.  She does take ibuprofen and it helps  She has been having some increasing VASQUEZ   She denies any CP  She says she just gets SOB  No coughing or wheezing    The following portions of the patient's history were reviewed and updated as appropriate: allergies, current medications, past medical history, past social history and problem list.  She did have a a lot of salt  Lately     Review of Systems   Musculoskeletal:        Left knee and hip pain     All other systems reviewed and are negative.      Objective   Physical Exam   Constitutional: She is oriented to person, place, and time. She appears well-developed and well-nourished.   HENT:   Head: Normocephalic and atraumatic.   Right Ear: External ear normal.   Left Ear: External ear normal.   Mouth/Throat: Oropharynx is clear and moist.   Eyes: Conjunctivae and EOM are normal. Pupils are equal, round, and reactive to light.   Neck: Normal range of motion. No tracheal deviation present. No thyromegaly present.   Cardiovascular: Normal rate, regular rhythm, normal heart sounds and intact distal pulses.   Pulmonary/Chest: Effort normal and breath sounds normal.   Abdominal: Soft. Bowel sounds are normal. She exhibits no distension. There is no tenderness.    Musculoskeletal: Normal range of motion. She exhibits no edema or deformity.   Neurological: She is alert and oriented to person, place, and time.   Skin: Skin is warm and dry.   Psychiatric: She has a normal mood and affect. Her behavior is normal. Judgment and thought content normal.   Vitals reviewed.      Vitals:    06/04/19 1407   BP: 118/76   Pulse: 68   Temp: 98.5 °F (36.9 °C)   SpO2: 98%     Results Encounter on 05/21/2019   Component Date Value Ref Range Status   • Glucose 05/31/2019 109* 65 - 99 mg/dL Final   • BUN 05/31/2019 21  8 - 23 mg/dL Final   • Creatinine 05/31/2019 0.98  0.57 - 1.00 mg/dL Final   • eGFR Non African Am 05/31/2019 57* >60 mL/min/1.73 Final   • eGFR African Am 05/31/2019 69  >60 mL/min/1.73 Final   • BUN/Creatinine Ratio 05/31/2019 21.4  7.0 - 25.0 Final   • Sodium 05/31/2019 145  136 - 145 mmol/L Final   • Potassium 05/31/2019 4.9  3.5 - 5.2 mmol/L Final   • Chloride 05/31/2019 110* 98 - 107 mmol/L Final   • Total CO2 05/31/2019 24.1  22.0 - 29.0 mmol/L Final   • Calcium 05/31/2019 10.1  8.6 - 10.5 mg/dL Final   • Total Protein 05/31/2019 6.5  6.0 - 8.5 g/dL Final   • Albumin 05/31/2019 4.50  3.50 - 5.20 g/dL Final   • Globulin 05/31/2019 2.0  gm/dL Final   • A/G Ratio 05/31/2019 2.3  g/dL Final   • Total Bilirubin 05/31/2019 0.3  0.2 - 1.2 mg/dL Final   • Alkaline Phosphatase 05/31/2019 42  39 - 117 U/L Final   • AST (SGOT) 05/31/2019 22  1 - 32 U/L Final   • ALT (SGPT) 05/31/2019 18  1 - 33 U/L Final   • WBC 05/31/2019 5.22  3.40 - 10.80 10*3/mm3 Final   • RBC 05/31/2019 4.38  3.77 - 5.28 10*6/mm3 Final   • Hemoglobin 05/31/2019 12.8  12.0 - 15.9 g/dL Final   • Hematocrit 05/31/2019 40.3  34.0 - 46.6 % Final   • MCV 05/31/2019 92.0  79.0 - 97.0 fL Final   • MCH 05/31/2019 29.2  26.6 - 33.0 pg Final   • MCHC 05/31/2019 31.8  31.5 - 35.7 g/dL Final   • RDW 05/31/2019 13.6  12.3 - 15.4 % Final   • Platelets 05/31/2019 353  140 - 450 10*3/mm3 Final   • Neutrophil Rel % 05/31/2019  50.7  42.7 - 76.0 % Final   • Lymphocyte Rel % 05/31/2019 36.8  19.6 - 45.3 % Final   • Monocyte Rel % 05/31/2019 8.4  5.0 - 12.0 % Final   • Eosinophil Rel % 05/31/2019 2.9  0.3 - 6.2 % Final   • Basophil Rel % 05/31/2019 0.8  0.0 - 1.5 % Final   • Neutrophils Absolute 05/31/2019 2.65  1.70 - 7.00 10*3/mm3 Final   • Lymphocytes Absolute 05/31/2019 1.92  0.70 - 3.10 10*3/mm3 Final   • Monocytes Absolute 05/31/2019 0.44  0.10 - 0.90 10*3/mm3 Final   • Eosinophils Absolute 05/31/2019 0.15  0.00 - 0.40 10*3/mm3 Final   • Basophils Absolute 05/31/2019 0.04  0.00 - 0.20 10*3/mm3 Final   • Immature Granulocyte Rel % 05/31/2019 0.4  0.0 - 0.5 % Final   • Immature Grans Absolute 05/31/2019 0.02  0.00 - 0.05 10*3/mm3 Final   • nRBC 05/31/2019 0.0  0.0 - 0.2 /100 WBC Final   • Total Cholesterol 05/31/2019 168  0 - 200 mg/dL Final   • Triglycerides 05/31/2019 100  0 - 150 mg/dL Final   • HDL Cholesterol 05/31/2019 52  40 - 60 mg/dL Final   • VLDL Cholesterol 05/31/2019 20  mg/dL Final   • LDL Cholesterol  05/31/2019 96  0 - 100 mg/dL Final   • LDL/HDL Ratio 05/31/2019 1.85   Final   • TSH 05/31/2019 2.720  0.270 - 4.200 mIU/mL Final     Current Outpatient Medications:   •  cetirizine (ZyrTEC) 10 MG tablet, Take 1 tablet by mouth daily., Disp: , Rfl:   •  Cholecalciferol (VITAMIN D3) 2000 UNITS capsule, Take 1 capsule by mouth daily., Disp: , Rfl:   •  clindamycin (CLEOCIN T) 1 % lotion, Apply 1 application topically 2 (Two) Times a Day., Disp: 60 mL, Rfl: 1  •  fenofibrate micronized (ANTARA) 130 MG capsule, Take 1 capsule by mouth Every Morning Before Breakfast., Disp: 90 capsule, Rfl: 1  •  lisinopril (PRINIVIL,ZESTRIL) 20 MG tablet, Take 1 tablet by mouth Daily., Disp: 90 tablet, Rfl: 1  •  meloxicam (MOBIC) 7.5 MG tablet, Take 1 tablet by mouth Daily. with food., Disp: 90 tablet, Rfl: 1  •  omeprazole (priLOSEC) 40 MG capsule, Take 1 capsule by mouth Daily., Disp: 90 capsule, Rfl: 1  •  rosuvastatin (CRESTOR) 40 MG tablet,  Take 1 tablet by mouth Every Night., Disp: 90 tablet, Rfl: 1  •  SYNTHROID 25 MCG tablet, Take 1 tablet by mouth Daily., Disp: 90 tablet, Rfl: 1  •  vilazodone (VIIBRYD) 40 MG tablet tablet, Take 1 tablet by mouth Daily., Disp: 90 tablet, Rfl: 1  •  YUVAFEM 10 MCG tablet vaginal tablet, Insert 1 tablet into the vagina 2 (Two) Times a Week., Disp: 24 tablet, Rfl: 1  •  aspirin 81 MG tablet, Take 1 tablet by mouth daily., Disp: , Rfl:   •  cephalexin (KEFLEX) 500 MG capsule, Take 1 capsule by mouth 2 (Two) Times a Day., Disp: 20 capsule, Rfl: 0         Assessment/Plan   Diagnoses and all orders for this visit:    Hyperlipidemia, unspecified hyperlipidemia type  -     Comprehensive Metabolic Panel; Future  -     LP+LDL / HDL Ratio ([facility]); Future  -     TSH Rfx On Abnormal To Free T4; Future  -     CBC & Differential; Future  -     Hemoglobin A1c; Future    Essential hypertension  -     Comprehensive Metabolic Panel; Future  -     LP+LDL / HDL Ratio ([facility]); Future  -     TSH Rfx On Abnormal To Free T4; Future  -     CBC & Differential; Future  -     Hemoglobin A1c; Future    Gastroesophageal reflux disease, esophagitis presence not specified  -     Comprehensive Metabolic Panel; Future  -     LP+LDL / HDL Ratio ([facility]); Future  -     TSH Rfx On Abnormal To Free T4; Future  -     CBC & Differential; Future  -     Hemoglobin A1c; Future    Acquired hypothyroidism    Anxiety    Osteoarthritis, unspecified osteoarthritis type, unspecified site    Elevated glucose  -     Hemoglobin A1c; Future    SOB (shortness of breath)  -     Ambulatory Referral to Pulmonology      1.  HPL- ok with current dose  2. HTN- ok with current   3.  GERD- ok with omeprazole  4.  Hypothyroidism- ok with current dose  5.  Anxiety-  She is doing well with the viibryd  6.  OA-  She is on mobic and will occas hold this and take ibuprofen instead  7.  SOB- VASQUEZ.-we will check a CXR

## 2019-07-12 RX ORDER — FENOFIBRATE 130 MG/1
CAPSULE ORAL
Qty: 90 CAPSULE | Refills: 1 | Status: SHIPPED | OUTPATIENT
Start: 2019-07-12 | End: 2019-12-11 | Stop reason: SDUPTHER

## 2019-07-26 ENCOUNTER — TRANSCRIBE ORDERS (OUTPATIENT)
Dept: ADMINISTRATIVE | Facility: HOSPITAL | Age: 69
End: 2019-07-26

## 2019-07-26 DIAGNOSIS — R06.09 DOE (DYSPNEA ON EXERTION): Primary | ICD-10-CM

## 2019-07-26 DIAGNOSIS — I27.20 PULMONARY HYPERTENSION (HCC): ICD-10-CM

## 2019-08-06 ENCOUNTER — TRANSCRIBE ORDERS (OUTPATIENT)
Dept: ADMINISTRATIVE | Facility: HOSPITAL | Age: 69
End: 2019-08-06

## 2019-08-06 DIAGNOSIS — I27.20 PHT (PULMONARY HYPERTENSION) (HCC): Primary | ICD-10-CM

## 2019-08-08 ENCOUNTER — HOSPITAL ENCOUNTER (OUTPATIENT)
Dept: NUCLEAR MEDICINE | Facility: HOSPITAL | Age: 69
Discharge: HOME | End: 2019-08-08

## 2019-08-08 ENCOUNTER — HOSPITAL ENCOUNTER (OUTPATIENT)
Dept: CARDIOLOGY | Facility: HOSPITAL | Age: 69
Discharge: HOME | End: 2019-08-08
Admitting: INTERNAL MEDICINE

## 2019-08-08 ENCOUNTER — APPOINTMENT (OUTPATIENT)
Dept: CARDIOLOGY | Facility: HOSPITAL | Age: 69
End: 2019-08-08

## 2019-08-08 ENCOUNTER — HOSPITAL ENCOUNTER (OUTPATIENT)
Dept: GENERAL RADIOLOGY | Facility: HOSPITAL | Age: 69
Discharge: HOME | End: 2019-08-08

## 2019-08-08 DIAGNOSIS — R06.09 DOE (DYSPNEA ON EXERTION): ICD-10-CM

## 2019-08-08 DIAGNOSIS — I27.20 PHT (PULMONARY HYPERTENSION) (HCC): ICD-10-CM

## 2019-08-08 DIAGNOSIS — I27.20 PULMONARY HTN (HCC): ICD-10-CM

## 2019-08-08 LAB
AORTIC DIMENSIONLESS INDEX: 0.8 (DI)
BH CV ECHO MEAS - AO MAX PG: 6 MMHG
BH CV ECHO MEAS - AO MEAN PG (FULL): 1 MMHG
BH CV ECHO MEAS - AO MEAN PG: 3 MMHG
BH CV ECHO MEAS - AO ROOT AREA (BSA CORRECTED): 1.4
BH CV ECHO MEAS - AO ROOT AREA: 6.2 CM^2
BH CV ECHO MEAS - AO ROOT DIAM: 2.8 CM
BH CV ECHO MEAS - AO V2 MAX: 121 CM/SEC
BH CV ECHO MEAS - AO V2 MEAN: 83.6 CM/SEC
BH CV ECHO MEAS - AO V2 VTI: 23.6 CM
BH CV ECHO MEAS - AVA(I,A): 2.2 CM^2
BH CV ECHO MEAS - AVA(I,D): 2.2 CM^2
BH CV ECHO MEAS - BSA(HAYCOCK): 2 M^2
BH CV ECHO MEAS - BSA: 1.9 M^2
BH CV ECHO MEAS - BZI_BMI: 32 KILOGRAMS/M^2
BH CV ECHO MEAS - BZI_METRIC_HEIGHT: 165.1 CM
BH CV ECHO MEAS - BZI_METRIC_WEIGHT: 87.1 KG
BH CV ECHO MEAS - EDV(CUBED): 54.9 ML
BH CV ECHO MEAS - EDV(MOD-SP2): 72 ML
BH CV ECHO MEAS - EDV(MOD-SP4): 89 ML
BH CV ECHO MEAS - EDV(TEICH): 62 ML
BH CV ECHO MEAS - EF(CUBED): 50.8 %
BH CV ECHO MEAS - EF(MOD-BP): 63 %
BH CV ECHO MEAS - EF(MOD-SP2): 65.3 %
BH CV ECHO MEAS - EF(MOD-SP4): 60.7 %
BH CV ECHO MEAS - EF(TEICH): 43.5 %
BH CV ECHO MEAS - ESV(CUBED): 27 ML
BH CV ECHO MEAS - ESV(MOD-SP2): 25 ML
BH CV ECHO MEAS - ESV(MOD-SP4): 35 ML
BH CV ECHO MEAS - ESV(TEICH): 35 ML
BH CV ECHO MEAS - FS: 21.1 %
BH CV ECHO MEAS - IVS/LVPW: 1
BH CV ECHO MEAS - IVSD: 1.1 CM
BH CV ECHO MEAS - LAT PEAK E' VEL: 8 CM/SEC
BH CV ECHO MEAS - LV DIASTOLIC VOL/BSA (35-75): 45.8 ML/M^2
BH CV ECHO MEAS - LV MASS(C)D: 134.7 GRAMS
BH CV ECHO MEAS - LV MASS(C)DI: 69.3 GRAMS/M^2
BH CV ECHO MEAS - LV MEAN PG: 2 MMHG
BH CV ECHO MEAS - LV SYSTOLIC VOL/BSA (12-30): 18 ML/M^2
BH CV ECHO MEAS - LV V1 MAX: 93 CM/SEC
BH CV ECHO MEAS - LV V1 MEAN: 63.9 CM/SEC
BH CV ECHO MEAS - LV V1 VTI: 18.7 CM
BH CV ECHO MEAS - LVIDD: 3.8 CM
BH CV ECHO MEAS - LVIDS: 3 CM
BH CV ECHO MEAS - LVLD AP2: 6.7 CM
BH CV ECHO MEAS - LVLD AP4: 6.9 CM
BH CV ECHO MEAS - LVLS AP2: 5.6 CM
BH CV ECHO MEAS - LVLS AP4: 5.5 CM
BH CV ECHO MEAS - LVOT AREA (M): 2.8 CM^2
BH CV ECHO MEAS - LVOT AREA: 2.8 CM^2
BH CV ECHO MEAS - LVOT DIAM: 1.9 CM
BH CV ECHO MEAS - LVPWD: 1.1 CM
BH CV ECHO MEAS - MED PEAK E' VEL: 5 CM/SEC
BH CV ECHO MEAS - MV A DUR: 0.16 SEC
BH CV ECHO MEAS - MV A MAX VEL: 82.4 CM/SEC
BH CV ECHO MEAS - MV DEC SLOPE: 244 CM/SEC^2
BH CV ECHO MEAS - MV DEC TIME: 201 SEC
BH CV ECHO MEAS - MV E MAX VEL: 64.7 CM/SEC
BH CV ECHO MEAS - MV E/A: 0.79
BH CV ECHO MEAS - MV MEAN PG: 1 MMHG
BH CV ECHO MEAS - MV P1/2T MAX VEL: 75.2 CM/SEC
BH CV ECHO MEAS - MV P1/2T: 90.3 MSEC
BH CV ECHO MEAS - MV V2 MEAN: 47.8 CM/SEC
BH CV ECHO MEAS - MV V2 VTI: 26.4 CM
BH CV ECHO MEAS - MVA P1/2T LCG: 2.9 CM^2
BH CV ECHO MEAS - MVA(P1/2T): 2.4 CM^2
BH CV ECHO MEAS - MVA(VTI): 2 CM^2
BH CV ECHO MEAS - PA ACC SLOPE: 1426 CM/SEC^2
BH CV ECHO MEAS - PA ACC TIME: 0.07 SEC
BH CV ECHO MEAS - PA MAX PG (FULL): 2.3 MMHG
BH CV ECHO MEAS - PA MAX PG: 3.8 MMHG
BH CV ECHO MEAS - PA PR(ACCEL): 47.5 MMHG
BH CV ECHO MEAS - PA V2 MAX: 97.7 CM/SEC
BH CV ECHO MEAS - RAP SYSTOLE: 3 MMHG
BH CV ECHO MEAS - RV MAX PG: 1.5 MMHG
BH CV ECHO MEAS - RV MEAN PG: 1 MMHG
BH CV ECHO MEAS - RV V1 MAX: 61.7 CM/SEC
BH CV ECHO MEAS - RV V1 MEAN: 39.6 CM/SEC
BH CV ECHO MEAS - RV V1 VTI: 12.6 CM
BH CV ECHO MEAS - RVSP: 19 MMHG
BH CV ECHO MEAS - SI(AO): 74.7 ML/M^2
BH CV ECHO MEAS - SI(CUBED): 14.3 ML/M^2
BH CV ECHO MEAS - SI(LVOT): 27.3 ML/M^2
BH CV ECHO MEAS - SI(MOD-SP2): 24.2 ML/M^2
BH CV ECHO MEAS - SI(MOD-SP4): 27.8 ML/M^2
BH CV ECHO MEAS - SI(TEICH): 13.9 ML/M^2
BH CV ECHO MEAS - SV(AO): 145.3 ML
BH CV ECHO MEAS - SV(CUBED): 27.9 ML
BH CV ECHO MEAS - SV(LVOT): 53 ML
BH CV ECHO MEAS - SV(MOD-SP2): 47 ML
BH CV ECHO MEAS - SV(MOD-SP4): 54 ML
BH CV ECHO MEAS - SV(TEICH): 27 ML
BH CV ECHO MEAS - TAPSE (>1.6): 1.6 CM2
BH CV ECHO MEAS - TR MAX VEL: 190 CM/SEC
BH CV ECHO MEASUREMENTS AVERAGE E/E' RATIO: 9.95
BH CV XLRA - RV BASE: 3.1 CM
BH CV XLRA - TDI S': 7.5 CM/SEC
LEFT ATRIUM VOLUME INDEX: 23 ML/M2
LV EF 2D ECHO EST: 63 %
MAXIMAL PREDICTED HEART RATE: 153 BPM
STRESS TARGET HR: 130 BPM

## 2019-08-08 PROCEDURE — 93306 TTE W/DOPPLER COMPLETE: CPT

## 2019-08-08 PROCEDURE — 0 TECHNETIUM ALBUMIN AGGREGATED: Performed by: INTERNAL MEDICINE

## 2019-08-08 PROCEDURE — 78582 LUNG VENTILAT&PERFUS IMAGING: CPT

## 2019-08-08 PROCEDURE — A9540 TC99M MAA: HCPCS | Performed by: INTERNAL MEDICINE

## 2019-08-08 PROCEDURE — 71046 X-RAY EXAM CHEST 2 VIEWS: CPT

## 2019-08-08 PROCEDURE — 0 XENON XE 133: Performed by: INTERNAL MEDICINE

## 2019-08-08 PROCEDURE — 93306 TTE W/DOPPLER COMPLETE: CPT | Performed by: INTERNAL MEDICINE

## 2019-08-08 PROCEDURE — A9558 XE133 XENON 10MCI: HCPCS | Performed by: INTERNAL MEDICINE

## 2019-08-08 RX ADMIN — XENON XE-133 8.7 MILLICURIE: 10 GAS RESPIRATORY (INHALATION) at 15:09

## 2019-08-08 RX ADMIN — Medication 1 DOSE: at 15:09

## 2019-08-14 DIAGNOSIS — E78.5 HYPERLIPIDEMIA, UNSPECIFIED HYPERLIPIDEMIA TYPE: ICD-10-CM

## 2019-08-14 RX ORDER — LISINOPRIL 20 MG/1
TABLET ORAL
Qty: 90 TABLET | Refills: 1 | Status: SHIPPED | OUTPATIENT
Start: 2019-08-14 | End: 2020-02-06

## 2019-08-14 RX ORDER — ROSUVASTATIN CALCIUM 40 MG/1
TABLET, COATED ORAL
Qty: 90 TABLET | Refills: 1 | Status: SHIPPED | OUTPATIENT
Start: 2019-08-14 | End: 2020-02-06

## 2019-08-20 ENCOUNTER — OFFICE VISIT (OUTPATIENT)
Dept: INTERNAL MEDICINE | Facility: CLINIC | Age: 69
End: 2019-08-20

## 2019-08-20 VITALS
SYSTOLIC BLOOD PRESSURE: 118 MMHG | DIASTOLIC BLOOD PRESSURE: 86 MMHG | HEIGHT: 66 IN | WEIGHT: 192.2 LBS | BODY MASS INDEX: 30.89 KG/M2 | TEMPERATURE: 97.8 F | OXYGEN SATURATION: 97 % | HEART RATE: 71 BPM

## 2019-08-20 DIAGNOSIS — M54.41 ACUTE RIGHT-SIDED LOW BACK PAIN WITH RIGHT-SIDED SCIATICA: Primary | ICD-10-CM

## 2019-08-20 PROCEDURE — 99213 OFFICE O/P EST LOW 20 MIN: CPT | Performed by: INTERNAL MEDICINE

## 2019-08-20 RX ORDER — PREDNISONE 10 MG/1
TABLET ORAL
Qty: 20 TABLET | Refills: 0 | Status: SHIPPED | OUTPATIENT
Start: 2019-08-20 | End: 2019-09-26

## 2019-08-20 NOTE — PROGRESS NOTES
Subjective   Dian Jefferson is a 69 y.o. female here today for right sided back pain and bilateral hip pain x 6 weeks  Pt was seeing the chiropractor with dry needling.      History of Present Illness   She has beenhaving right flank pain on and off for 6 week  She has seen PT in the past for this on the left and she did well but this is worse.  No bladder or bowell incontinence  She was sen by the chiro and had some dry needling that did help  She has had little help with mobic    The following portions of the patient's history were reviewed and updated as appropriate: allergies, current medications, past medical history, past social history and problem list.  She denies any new PA    Review of Systems   All other systems reviewed and are negative.      Objective   Physical Exam   Constitutional: She is oriented to person, place, and time. She appears well-developed and well-nourished.   HENT:   Head: Normocephalic and atraumatic.   Right Ear: External ear normal.   Left Ear: External ear normal.   Mouth/Throat: Oropharynx is clear and moist.   Eyes: Conjunctivae and EOM are normal. Pupils are equal, round, and reactive to light.   Neck: Normal range of motion. No tracheal deviation present. No thyromegaly present.   Cardiovascular: Normal rate, regular rhythm, normal heart sounds and intact distal pulses.   Pulmonary/Chest: Effort normal and breath sounds normal.   Abdominal: Soft. Bowel sounds are normal. She exhibits no distension. There is no tenderness.   Musculoskeletal: Normal range of motion. She exhibits no edema or deformity.   Neurological: She is alert and oriented to person, place, and time.   Skin: Skin is warm and dry.   Psychiatric: She has a normal mood and affect. Her behavior is normal. Judgment and thought content normal.   Vitals reviewed.      Vitals:    08/20/19 1412   BP: 118/86   Pulse: 71   Temp: 97.8 °F (36.6 °C)   SpO2: 97%       Current Outpatient Medications:   •  cetirizine (ZyrTEC) 10  MG tablet, Take 1 tablet by mouth daily., Disp: , Rfl:   •  Cholecalciferol (VITAMIN D3) 2000 UNITS capsule, Take 1 capsule by mouth daily., Disp: , Rfl:   •  clindamycin (CLEOCIN T) 1 % lotion, Apply 1 application topically 2 (Two) Times a Day., Disp: 60 mL, Rfl: 1  •  fenofibrate micronized (ANTARA) 130 MG capsule, TAKE 1 CAPSULE BY MOUTH EVERY DAY IN THE MORNING BEFORE BREAKFAST, Disp: 90 capsule, Rfl: 1  •  lisinopril (PRINIVIL,ZESTRIL) 20 MG tablet, TAKE 1 TABLET BY MOUTH EVERY DAY, Disp: 90 tablet, Rfl: 1  •  meloxicam (MOBIC) 7.5 MG tablet, Take 1 tablet by mouth Daily. with food., Disp: 90 tablet, Rfl: 1  •  omeprazole (priLOSEC) 40 MG capsule, Take 1 capsule by mouth Daily., Disp: 90 capsule, Rfl: 1  •  rosuvastatin (CRESTOR) 40 MG tablet, TAKE 1 TABLET BY MOUTH EVERY DAY AT NIGHT, Disp: 90 tablet, Rfl: 1  •  SYNTHROID 25 MCG tablet, Take 1 tablet by mouth Daily., Disp: 90 tablet, Rfl: 1  •  vilazodone (VIIBRYD) 40 MG tablet tablet, Take 1 tablet by mouth Daily., Disp: 90 tablet, Rfl: 1  •  YUVAFEM 10 MCG tablet vaginal tablet, Insert 1 tablet into the vagina 2 (Two) Times a Week., Disp: 24 tablet, Rfl: 1  •  cephalexin (KEFLEX) 500 MG capsule, Take 1 capsule by mouth 2 (Two) Times a Day., Disp: 20 capsule, Rfl: 0  •  predniSONE (DELTASONE) 10 MG tablet, 4 tabs daily for 2 days then 3 for 2 days then 2 for 2 days then 1 for 2 days then stop, Disp: 20 tablet, Rfl: 0       Assessment/Plan   Diagnoses and all orders for this visit:    Acute right-sided low back pain with right-sided sciatica  -     MRI Lumbar Spine Without Contrast    Other orders  -     predniSONE (DELTASONE) 10 MG tablet; 4 tabs daily for 2 days then 3 for 2 days then 2 for 2 days then 1 for 2 days then stop    1.  Back pain-  On the right radiates into the gluteal area on the right  It may be a herniated disc.  We will try steroids and if no better get an MRI   We will try steroids and if not better get an MRI

## 2019-08-26 ENCOUNTER — TRANSCRIBE ORDERS (OUTPATIENT)
Dept: ADMINISTRATIVE | Facility: HOSPITAL | Age: 69
End: 2019-08-26

## 2019-08-26 DIAGNOSIS — R06.02 SHORTNESS OF BREATH ON EXERTION: Primary | ICD-10-CM

## 2019-08-27 ENCOUNTER — HOSPITAL ENCOUNTER (OUTPATIENT)
Dept: MRI IMAGING | Facility: HOSPITAL | Age: 69
Discharge: HOME | End: 2019-08-27
Admitting: INTERNAL MEDICINE

## 2019-08-27 PROCEDURE — 72148 MRI LUMBAR SPINE W/O DYE: CPT

## 2019-08-28 ENCOUNTER — HOSPITAL ENCOUNTER (OUTPATIENT)
Dept: CT IMAGING | Facility: HOSPITAL | Age: 69
Discharge: HOME | End: 2019-08-28
Admitting: INTERNAL MEDICINE

## 2019-08-28 DIAGNOSIS — M51.36 DDD (DEGENERATIVE DISC DISEASE), LUMBAR: ICD-10-CM

## 2019-08-28 DIAGNOSIS — M54.41 ACUTE RIGHT-SIDED LOW BACK PAIN WITH RIGHT-SIDED SCIATICA: Primary | ICD-10-CM

## 2019-08-28 DIAGNOSIS — R06.02 SHORTNESS OF BREATH ON EXERTION: ICD-10-CM

## 2019-08-28 PROCEDURE — 71250 CT THORAX DX C-: CPT

## 2019-08-29 ENCOUNTER — TELEPHONE (OUTPATIENT)
Dept: INTERNAL MEDICINE | Facility: CLINIC | Age: 69
End: 2019-08-29

## 2019-08-29 RX ORDER — TRAMADOL HYDROCHLORIDE 50 MG/1
50 TABLET ORAL EVERY 8 HOURS PRN
Qty: 20 TABLET | Refills: 0 | Status: SHIPPED | OUTPATIENT
Start: 2019-08-29 | End: 2019-09-26

## 2019-08-29 NOTE — TELEPHONE ENCOUNTER
Call patient and let her know we can try some tramadol.  This can be sedating so she needs to not drive while she is on it.  Also monitor for any constipation.  She can take Tylenol as needed with this.  I have sent this to her [facility]    ----- Message from Karmen Pham MA sent at 8/29/2019 11:35 AM EDT -----  Pt stated that the prednisone did not help her. She is about to go out of town today @ 3pm & would really like something else sent in for her. She is nervous that Dr. Dubon would take too long to get into. Who would your second choice be for NS?     ----- Message -----  From: Mallika Stewart MD  Sent: 8/29/2019   8:12 AM  To: Karmen Pham MA    Did the prednisone help at all ?  Is so she might benefit from an NSAID  ----- Message -----  From: Charo Ibarra MA  Sent: 8/28/2019   2:25 PM  To: Mallika Stewart MD    PT AWARE. SHE WANTS TO KNOW IF SHE CAN HAVE SOMETHING TO HELP WITH PAIN WHEN HER BACK GOES OUT? SHE IS TAKING TYLENOL 500 MG RIGHT NOW. SHE DOESN'T WANT ANYTHING TOO STRONG. PAIN PILL? MUSCLE RELAXER?

## 2019-09-06 NOTE — PROGRESS NOTES
Subjective   Patient ID: Dian Jefferson is a 69 y.o. female is being seen for consultation today at the request of Mallika Stewart MD for intermittent low back pain that radiates into the right thigh with a burning sensation. Patient reports weakness. Patient reports difficulty walking. Patient has tried ice, heat, dry needling, PT and a lumbar support brace with no relief    Back Pain   This is a new problem. The current episode started more than 1 month ago. The problem occurs constantly. The problem has been gradually worsening since onset. The pain is present in the lumbar spine. The quality of the pain is described as burning. The pain radiates to the right thigh. The pain is at a severity of 10/10. The pain is severe. The pain is the same all the time. The symptoms are aggravated by position. Associated symptoms include leg pain. Pertinent negatives include no bladder incontinence, bowel incontinence, chest pain, numbness, pelvic pain, perianal numbness, tingling or weakness. She has tried heat and ice (Dry needling, PT, Lumbar brace) for the symptoms. The treatment provided no relief.       The following portions of the patient's history were reviewed and updated as appropriate: allergies, current medications, past family history, past medical history, past social history, past surgical history and problem list.    Review of Systems   Respiratory: Negative for chest tightness and shortness of breath.    Cardiovascular: Negative for chest pain.   Gastrointestinal: Negative for bowel incontinence.   Genitourinary: Negative for bladder incontinence and pelvic pain.   Musculoskeletal: Positive for back pain and gait problem.   Neurological: Negative for tingling, weakness and numbness.   All other systems reviewed and are negative.      Objective   Physical Exam   Constitutional: She is oriented to person, place, and time. She appears well-developed and well-nourished.   HENT:   Head: Normocephalic and atraumatic.    Right Ear: External ear normal.   Left Ear: External ear normal.   Eyes: Conjunctivae and EOM are normal. Pupils are equal, round, and reactive to light. Right eye exhibits no discharge. Left eye exhibits no discharge.   Neck: Normal range of motion. Neck supple. No tracheal deviation present.   Cardiovascular: Intact distal pulses.   Pulmonary/Chest: Effort normal. No stridor. No respiratory distress.   Musculoskeletal: Normal range of motion. She exhibits no edema, tenderness or deformity.   Neurological: She is alert and oriented to person, place, and time. She has normal strength and normal reflexes. She displays no atrophy, no tremor and normal reflexes. No cranial nerve deficit or sensory deficit. She exhibits normal muscle tone. She displays a negative Romberg sign. She displays no seizure activity. Coordination and gait normal.   No long tract signs   Skin: Skin is warm and dry.   Psychiatric: She has a normal mood and affect. Her behavior is normal. Judgment and thought content normal.   Nursing note and vitals reviewed.    Neurologic Exam     Mental Status   Oriented to person, place, and time.     Cranial Nerves     CN III, IV, VI   Pupils are equal, round, and reactive to light.  Extraocular motions are normal.     Motor Exam     Strength   Strength 5/5 throughout.       Assessment/Plan   Independent Review of Radiographic Studies:    I did review the lumbar spine MRI from August 21, 2019 which shows multilevel disc degeneration with fairly severe facet arthropathy and degenerative anterolisthesis at both L3-L4 and L4-L5.  There is moderate stenosis at both levels.  There is mild right lateral recess narrowing at L5-S1 as well.  Medical Decision Making:    Ms. Jefferson came to see us today for a 3 to 4-month history of low back pain that is mainly on the right side that radiates into the right buttock.  No leg pain or numbness or tingling or weakness or incontinence.  She had an episode of left-sided  radiculopathy years ago that resolved with therapy.  No other history of previous back issues.  She is gone to physical therapy recently with some relief.  The pain is still described as a fairly constant moderate to severe burning type pain that worsens with any prolonged sitting or standing or walking and improves minimally when laying down flat.  I reviewed the patient's MRI with her and her  and explained that the back pain is more degenerative wear as the buttock pain is likely referred from the stenosis.  We discussed epidurals as well as the associated risks of bleeding, infection and headache and she does wish to proceed.  She will follow-up thereafter and call sooner with questions or concerns.  Diagnoses and all orders for this visit:    Spinal stenosis, lumbar region, with neurogenic claudication  -     Epidural Block    DDD (degenerative disc disease), lumbar  -     Epidural Block      Return in about 2 months (around 11/11/2019).

## 2019-09-09 RX ORDER — MELOXICAM 7.5 MG/1
TABLET ORAL
Qty: 90 TABLET | Refills: 1 | Status: SHIPPED | OUTPATIENT
Start: 2019-09-09 | End: 2019-12-10

## 2019-09-09 RX ORDER — LEVOTHYROXINE SODIUM 25 MCG
TABLET ORAL
Qty: 90 TABLET | Refills: 1 | Status: SHIPPED | OUTPATIENT
Start: 2019-09-09 | End: 2020-03-16

## 2019-09-11 ENCOUNTER — TELEPHONE (OUTPATIENT)
Dept: CARDIOLOGY | Facility: CLINIC | Age: 69
End: 2019-09-11

## 2019-09-11 ENCOUNTER — OFFICE VISIT (OUTPATIENT)
Dept: NEUROSURGERY | Facility: CLINIC | Age: 69
End: 2019-09-11

## 2019-09-11 VITALS
DIASTOLIC BLOOD PRESSURE: 72 MMHG | BODY MASS INDEX: 30.53 KG/M2 | SYSTOLIC BLOOD PRESSURE: 113 MMHG | WEIGHT: 190 LBS | HEIGHT: 66 IN | HEART RATE: 83 BPM

## 2019-09-11 DIAGNOSIS — M48.062 SPINAL STENOSIS, LUMBAR REGION, WITH NEUROGENIC CLAUDICATION: Primary | ICD-10-CM

## 2019-09-11 DIAGNOSIS — M51.36 DDD (DEGENERATIVE DISC DISEASE), LUMBAR: ICD-10-CM

## 2019-09-11 PROBLEM — M51.369 DDD (DEGENERATIVE DISC DISEASE), LUMBAR: Status: ACTIVE | Noted: 2019-09-11

## 2019-09-11 PROCEDURE — 99204 OFFICE O/P NEW MOD 45 MIN: CPT | Performed by: PHYSICIAN ASSISTANT

## 2019-09-20 ENCOUNTER — APPOINTMENT (OUTPATIENT)
Dept: PAIN MEDICINE | Facility: HOSPITAL | Age: 69
End: 2019-09-20

## 2019-09-26 ENCOUNTER — OFFICE VISIT (OUTPATIENT)
Dept: CARDIOLOGY | Facility: CLINIC | Age: 69
End: 2019-09-26

## 2019-09-26 VITALS
DIASTOLIC BLOOD PRESSURE: 80 MMHG | HEART RATE: 74 BPM | HEIGHT: 66 IN | SYSTOLIC BLOOD PRESSURE: 118 MMHG | BODY MASS INDEX: 30.7 KG/M2 | WEIGHT: 191 LBS

## 2019-09-26 DIAGNOSIS — R06.09 DYSPNEA ON EXERTION: ICD-10-CM

## 2019-09-26 DIAGNOSIS — I10 ESSENTIAL HYPERTENSION: Primary | ICD-10-CM

## 2019-09-26 PROCEDURE — 99214 OFFICE O/P EST MOD 30 MIN: CPT | Performed by: INTERNAL MEDICINE

## 2019-09-26 PROCEDURE — 93000 ELECTROCARDIOGRAM COMPLETE: CPT | Performed by: INTERNAL MEDICINE

## 2019-09-26 NOTE — PROGRESS NOTES
Subjective:     Encounter Date:09/26/2019      Patient ID: Dian Jefferson is a 69 y.o. female.    Chief Complaint: Dyspnea  History of Present Illness    This is a 69-year-old female who presents today for evaluation.  She had seen Dr. Oh several years ago and got my name from a neighbor and a friend.  Patient had been seeing Dr. Gonzalez for shortness of breath.  Patient says that the shortness of breath mostly occurs with exertion.  She used to walk a lot at one point did the mini marathon.  Patient has been trying to walk consistently for the last several years.  She can make it about a quarter of mile or maybe half a mile at most and she is just too short of breath to continue.  She is been complaining some back discomfort which is slowed her down some but despite her repetitive efforts of trying to exercise she continues to become more more short of breath.  Work-up from a pulmonary standpoint has been unremarkable so she was referred to cardiology to eliminate a cardiac etiology.      Review of Systems   Respiratory: Positive for shortness of breath.    Musculoskeletal: Positive for joint pain and myalgias.   All other systems reviewed and are negative.        ECG 12 Lead  Date/Time: 9/26/2019 12:52 PM  Performed by: Leon Peter MD  Authorized by: Leon Peter MD   Comparison: compared with previous ECG from 11/16/2017  Rhythm: sinus rhythm  Other findings: T wave abnormality and early transition    Clinical impression: abnormal EKG               Objective:     Physical Exam   Constitutional: She is oriented to person, place, and time. She appears well-developed.   HENT:   Head: Normocephalic.   Eyes: Conjunctivae are normal.   Neck: Normal range of motion.   Cardiovascular: Normal rate, regular rhythm and normal heart sounds.   Pulmonary/Chest: Breath sounds normal.   Abdominal: Soft. Bowel sounds are normal.   Musculoskeletal: Normal range of motion. She exhibits no edema.    Neurological: She is alert and oriented to person, place, and time.   Skin: Skin is warm and dry.   Psychiatric: She has a normal mood and affect. Her behavior is normal.   Vitals reviewed.      Lab Review:       Assessment:          Diagnosis Plan   1. Essential hypertension  Adult Stress Echo W/ Cont or Stress Agent if Necessary Per Protocol   2. Dyspnea on exertion  Adult Stress Echo W/ Cont or Stress Agent if Necessary Per Protocol          Plan:         1.  Exertional shortness of breath.  This is very concerning considering the patient has been exercising and has been trying to push herself but despite doing that now for a prolonged period of time she is not improving.  At first she was wondering if she should undergo a right and left heart cath and I think that we need to start with a stress echo.  Her EKG is unchanged since 2017 but shows some nonspecific changes.  The other thing I would be looking for is a dynamic outflow tract obstruction see what her blood pressure does and how far she walks.  Another possibility would be a metabolic stress test.  At this point I would start with a stress echo and go from there.  She did have a regular echo recently which was normal with the exception of mild mitral regurgitation.  Patient had a high resolution CT scan on 8/28/2019 with no coronary artery calcification identified.

## 2019-10-08 ENCOUNTER — HOSPITAL ENCOUNTER (OUTPATIENT)
Dept: CARDIOLOGY | Facility: HOSPITAL | Age: 69
Discharge: HOME | End: 2019-10-08
Admitting: INTERNAL MEDICINE

## 2019-10-08 VITALS
WEIGHT: 191 LBS | HEIGHT: 65 IN | SYSTOLIC BLOOD PRESSURE: 130 MMHG | HEART RATE: 79 BPM | BODY MASS INDEX: 31.82 KG/M2 | DIASTOLIC BLOOD PRESSURE: 80 MMHG

## 2019-10-08 DIAGNOSIS — R06.09 DYSPNEA ON EXERTION: ICD-10-CM

## 2019-10-08 DIAGNOSIS — I10 ESSENTIAL HYPERTENSION: ICD-10-CM

## 2019-10-08 PROCEDURE — 93350 STRESS TTE ONLY: CPT | Performed by: INTERNAL MEDICINE

## 2019-10-08 PROCEDURE — 93325 DOPPLER ECHO COLOR FLOW MAPG: CPT

## 2019-10-08 PROCEDURE — 93018 CV STRESS TEST I&R ONLY: CPT | Performed by: INTERNAL MEDICINE

## 2019-10-08 PROCEDURE — 93320 DOPPLER ECHO COMPLETE: CPT | Performed by: INTERNAL MEDICINE

## 2019-10-08 PROCEDURE — 93350 STRESS TTE ONLY: CPT

## 2019-10-08 PROCEDURE — 25010000002 PERFLUTREN (DEFINITY) 8.476 MG IN SODIUM CHLORIDE 0.9 % 10 ML INJECTION: Performed by: INTERNAL MEDICINE

## 2019-10-08 PROCEDURE — 93320 DOPPLER ECHO COMPLETE: CPT

## 2019-10-08 PROCEDURE — 93352 ADMIN ECG CONTRAST AGENT: CPT | Performed by: INTERNAL MEDICINE

## 2019-10-08 PROCEDURE — 93325 DOPPLER ECHO COLOR FLOW MAPG: CPT | Performed by: INTERNAL MEDICINE

## 2019-10-08 PROCEDURE — 93017 CV STRESS TEST TRACING ONLY: CPT

## 2019-10-08 PROCEDURE — 93016 CV STRESS TEST SUPVJ ONLY: CPT | Performed by: INTERNAL MEDICINE

## 2019-10-08 RX ADMIN — PERFLUTREN 3 ML: 6.52 INJECTION, SUSPENSION INTRAVENOUS at 15:11

## 2019-10-09 ENCOUNTER — APPOINTMENT (OUTPATIENT)
Dept: PAIN MEDICINE | Facility: HOSPITAL | Age: 69
End: 2019-10-09

## 2019-10-09 LAB
AORTIC ROOT ANNULUS: 1.9 CM
ASCENDING AORTA: 3.1 CM
BH CV ECHO MEAS - ACS: 1.9 CM
BH CV ECHO MEAS - AO MAX PG: 10.4 MMHG
BH CV ECHO MEAS - AO ROOT AREA (BSA CORRECTED): 1.5
BH CV ECHO MEAS - AO ROOT AREA: 6.8 CM^2
BH CV ECHO MEAS - AO ROOT DIAM: 2.9 CM
BH CV ECHO MEAS - AO V2 MAX: 161.5 CM/SEC
BH CV ECHO MEAS - ASC AORTA: 3.1 CM
BH CV ECHO MEAS - BSA(HAYCOCK): 2 M^2
BH CV ECHO MEAS - BSA: 1.9 M^2
BH CV ECHO MEAS - BZI_BMI: 31.8 KILOGRAMS/M^2
BH CV ECHO MEAS - BZI_METRIC_HEIGHT: 165.1 CM
BH CV ECHO MEAS - BZI_METRIC_WEIGHT: 86.6 KG
BH CV ECHO MEAS - EDV(CUBED): 191.3 ML
BH CV ECHO MEAS - EDV(MOD-SP2): 56 ML
BH CV ECHO MEAS - EDV(MOD-SP4): 49 ML
BH CV ECHO MEAS - EDV(TEICH): 164 ML
BH CV ECHO MEAS - EF(CUBED): 63.9 %
BH CV ECHO MEAS - EF(MOD-BP): 67 %
BH CV ECHO MEAS - EF(MOD-SP2): 78.6 %
BH CV ECHO MEAS - EF(MOD-SP4): 77.6 %
BH CV ECHO MEAS - EF(TEICH): 54.7 %
BH CV ECHO MEAS - ESV(MOD-SP2): 12 ML
BH CV ECHO MEAS - ESV(MOD-SP4): 11 ML
BH CV ECHO MEAS - ESV(TEICH): 74.3 ML
BH CV ECHO MEAS - IVS/LVPW: 0.92
BH CV ECHO MEAS - IVSD: 1 CM
BH CV ECHO MEAS - LAT PEAK E' VEL: 13 CM/SEC
BH CV ECHO MEAS - LV DIASTOLIC VOL/BSA (35-75): 25.3 ML/M^2
BH CV ECHO MEAS - LV MASS(C)D: 248.9 GRAMS
BH CV ECHO MEAS - LV MASS(C)DI: 128.3 GRAMS/M^2
BH CV ECHO MEAS - LV SYSTOLIC VOL/BSA (12-30): 5.7 ML/M^2
BH CV ECHO MEAS - LVIDD: 5.8 CM
BH CV ECHO MEAS - LVIDS: 4.1 CM
BH CV ECHO MEAS - LVLD AP2: 7.3 CM
BH CV ECHO MEAS - LVLD AP4: 6.4 CM
BH CV ECHO MEAS - LVLS AP2: 6.1 CM
BH CV ECHO MEAS - LVLS AP4: 5 CM
BH CV ECHO MEAS - LVPWD: 1.1 CM
BH CV ECHO MEAS - MED PEAK E' VEL: 6 CM/SEC
BH CV ECHO MEAS - MV A DUR: 0.13 SEC
BH CV ECHO MEAS - MV A MAX VEL: 82.9 CM/SEC
BH CV ECHO MEAS - MV DEC SLOPE: 451.1 CM/SEC^2
BH CV ECHO MEAS - MV DEC TIME: 0.22 SEC
BH CV ECHO MEAS - MV E MAX VEL: 70.3 CM/SEC
BH CV ECHO MEAS - MV E/A: 0.85
BH CV ECHO MEAS - MV P1/2T MAX VEL: 77.4 CM/SEC
BH CV ECHO MEAS - MV P1/2T: 50.2 MSEC
BH CV ECHO MEAS - MVA P1/2T LCG: 2.8 CM^2
BH CV ECHO MEAS - MVA(P1/2T): 4.4 CM^2
BH CV ECHO MEAS - PULM A REVS DUR: 0.1 SEC
BH CV ECHO MEAS - PULM A REVS VEL: 32.5 CM/SEC
BH CV ECHO MEAS - PULM DIAS VEL: 49 CM/SEC
BH CV ECHO MEAS - PULM S/D: 1.4
BH CV ECHO MEAS - PULM SYS VEL: 67.9 CM/SEC
BH CV ECHO MEAS - RAP SYSTOLE: 3 MMHG
BH CV ECHO MEAS - RVSP: 23 MMHG
BH CV ECHO MEAS - SI(CUBED): 63 ML/M^2
BH CV ECHO MEAS - SI(MOD-SP2): 22.7 ML/M^2
BH CV ECHO MEAS - SI(MOD-SP4): 19.6 ML/M^2
BH CV ECHO MEAS - SI(TEICH): 46.3 ML/M^2
BH CV ECHO MEAS - SV(CUBED): 122.2 ML
BH CV ECHO MEAS - SV(MOD-SP2): 44 ML
BH CV ECHO MEAS - SV(MOD-SP4): 38 ML
BH CV ECHO MEAS - SV(TEICH): 89.7 ML
BH CV ECHO MEAS - TAPSE (>1.6): 1.8 CM2
BH CV ECHO MEAS - TR MAX VEL: 224 CM/SEC
BH CV ECHO MEASUREMENTS AVERAGE E/E' RATIO: 7.4
BH CV STRESS BP STAGE 1: NORMAL
BH CV STRESS BP STAGE 2: NORMAL
BH CV STRESS DURATION MIN STAGE 1: 3
BH CV STRESS DURATION MIN STAGE 2: 3
BH CV STRESS DURATION SEC STAGE 1: 0
BH CV STRESS DURATION SEC STAGE 2: 0
BH CV STRESS ECHO POST STRESS EJECTION FRACTION EF: 78 %
BH CV STRESS GRADE STAGE 1: 10
BH CV STRESS GRADE STAGE 2: 12
BH CV STRESS HR STAGE 1: 115
BH CV STRESS HR STAGE 2: 135
BH CV STRESS METS STAGE 1: 5
BH CV STRESS METS STAGE 2: 7.5
BH CV STRESS PROTOCOL 1: NORMAL
BH CV STRESS SPEED STAGE 1: 1.7
BH CV STRESS SPEED STAGE 2: 2.5
BH CV STRESS STAGE 1: 1
BH CV STRESS STAGE 2: 2
BH CV XLRA - RV BASE: 2.8 CM
BH CV XLRA - TDI S': 7 CM/SEC
LEFT ATRIUM VOLUME INDEX: 18 ML/M2
LV EF 2D ECHO EST: 67 %
MAXIMAL PREDICTED HEART RATE: 153 BPM
PERCENT MAX PREDICTED HR: 88.24 %
SINUS: 2.7 CM
STJ: 2.9 CM
STRESS BASELINE BP: NORMAL MMHG
STRESS BASELINE HR: 79 BPM
STRESS PERCENT HR: 104 %
STRESS POST ESTIMATED WORKLOAD: 7.5 METS
STRESS POST EXERCISE DUR MIN: 6 MIN
STRESS POST EXERCISE DUR SEC: 0 SEC
STRESS POST PEAK BP: NORMAL MMHG
STRESS POST PEAK HR: 135 BPM
STRESS TARGET HR: 130 BPM

## 2019-10-09 RX ORDER — OMEPRAZOLE 40 MG/1
CAPSULE, DELAYED RELEASE ORAL
Qty: 90 CAPSULE | Refills: 1 | Status: SHIPPED | OUTPATIENT
Start: 2019-10-09 | End: 2020-05-11

## 2019-10-10 ENCOUNTER — TELEPHONE (OUTPATIENT)
Dept: CARDIOLOGY | Facility: CLINIC | Age: 69
End: 2019-10-10

## 2019-10-10 NOTE — TELEPHONE ENCOUNTER
Patient called the office today wanting to know her results of her echo that was done on 10/08/19. The results are epic.    Patient can be reached at [phone] thanks

## 2019-12-04 ENCOUNTER — RESULTS ENCOUNTER (OUTPATIENT)
Dept: INTERNAL MEDICINE | Facility: CLINIC | Age: 69
End: 2019-12-04

## 2019-12-04 DIAGNOSIS — I10 ESSENTIAL HYPERTENSION: ICD-10-CM

## 2019-12-04 DIAGNOSIS — R73.09 ELEVATED GLUCOSE: ICD-10-CM

## 2019-12-04 DIAGNOSIS — E78.5 HYPERLIPIDEMIA, UNSPECIFIED HYPERLIPIDEMIA TYPE: ICD-10-CM

## 2019-12-04 DIAGNOSIS — K21.9 GASTROESOPHAGEAL REFLUX DISEASE, ESOPHAGITIS PRESENCE NOT SPECIFIED: ICD-10-CM

## 2019-12-05 LAB
ALBUMIN SERPL-MCNC: 4.9 G/DL (ref 3.5–5.2)
ALBUMIN/GLOB SERPL: 2.6 G/DL
ALP SERPL-CCNC: 40 U/L (ref 39–117)
ALT SERPL-CCNC: 18 U/L (ref 1–33)
AST SERPL-CCNC: 25 U/L (ref 1–32)
BASOPHILS # BLD AUTO: 0.04 10*3/MM3 (ref 0–0.2)
BASOPHILS NFR BLD AUTO: 0.8 % (ref 0–1.5)
BILIRUB SERPL-MCNC: 0.4 MG/DL (ref 0.2–1.2)
BUN SERPL-MCNC: 16 MG/DL (ref 8–23)
BUN/CREAT SERPL: 17.2 (ref 7–25)
CALCIUM SERPL-MCNC: 9.5 MG/DL (ref 8.6–10.5)
CHLORIDE SERPL-SCNC: 103 MMOL/L (ref 98–107)
CHOLEST SERPL-MCNC: 193 MG/DL (ref 0–200)
CO2 SERPL-SCNC: 24.5 MMOL/L (ref 22–29)
CREAT SERPL-MCNC: 0.93 MG/DL (ref 0.57–1)
EOSINOPHIL # BLD AUTO: 0.19 10*3/MM3 (ref 0–0.4)
EOSINOPHIL NFR BLD AUTO: 3.7 % (ref 0.3–6.2)
ERYTHROCYTE [DISTWIDTH] IN BLOOD BY AUTOMATED COUNT: 12.6 % (ref 12.3–15.4)
GLOBULIN SER CALC-MCNC: 1.9 GM/DL
GLUCOSE SERPL-MCNC: 82 MG/DL (ref 65–99)
HBA1C MFR BLD: 5.9 % (ref 4.8–5.6)
HCT VFR BLD AUTO: 38.7 % (ref 34–46.6)
HDLC SERPL-MCNC: 54 MG/DL (ref 40–60)
HGB BLD-MCNC: 12.5 G/DL (ref 12–15.9)
IMM GRANULOCYTES # BLD AUTO: 0.01 10*3/MM3 (ref 0–0.05)
IMM GRANULOCYTES NFR BLD AUTO: 0.2 % (ref 0–0.5)
LDLC SERPL CALC-MCNC: 115 MG/DL (ref 0–100)
LDLC/HDLC SERPL: 2.13 {RATIO}
LYMPHOCYTES # BLD AUTO: 1.65 10*3/MM3 (ref 0.7–3.1)
LYMPHOCYTES NFR BLD AUTO: 31.8 % (ref 19.6–45.3)
MCH RBC QN AUTO: 28.8 PG (ref 26.6–33)
MCHC RBC AUTO-ENTMCNC: 32.3 G/DL (ref 31.5–35.7)
MCV RBC AUTO: 89.2 FL (ref 79–97)
MONOCYTES # BLD AUTO: 0.41 10*3/MM3 (ref 0.1–0.9)
MONOCYTES NFR BLD AUTO: 7.9 % (ref 5–12)
NEUTROPHILS # BLD AUTO: 2.89 10*3/MM3 (ref 1.7–7)
NEUTROPHILS NFR BLD AUTO: 55.6 % (ref 42.7–76)
NRBC BLD AUTO-RTO: 0 /100 WBC (ref 0–0.2)
PLATELET # BLD AUTO: 386 10*3/MM3 (ref 140–450)
POTASSIUM SERPL-SCNC: 4.3 MMOL/L (ref 3.5–5.2)
PROT SERPL-MCNC: 6.8 G/DL (ref 6–8.5)
RBC # BLD AUTO: 4.34 10*6/MM3 (ref 3.77–5.28)
SODIUM SERPL-SCNC: 142 MMOL/L (ref 136–145)
TRIGL SERPL-MCNC: 119 MG/DL (ref 0–150)
TSH SERPL DL<=0.005 MIU/L-ACNC: 2.93 UIU/ML (ref 0.27–4.2)
VLDLC SERPL CALC-MCNC: 23.8 MG/DL
WBC # BLD AUTO: 5.19 10*3/MM3 (ref 3.4–10.8)

## 2019-12-10 ENCOUNTER — OFFICE VISIT (OUTPATIENT)
Dept: INTERNAL MEDICINE | Facility: CLINIC | Age: 69
End: 2019-12-10

## 2019-12-10 VITALS
DIASTOLIC BLOOD PRESSURE: 78 MMHG | TEMPERATURE: 98.5 F | OXYGEN SATURATION: 97 % | WEIGHT: 191.9 LBS | HEIGHT: 65 IN | HEART RATE: 68 BPM | BODY MASS INDEX: 31.97 KG/M2 | SYSTOLIC BLOOD PRESSURE: 116 MMHG

## 2019-12-10 DIAGNOSIS — K21.9 GASTROESOPHAGEAL REFLUX DISEASE, ESOPHAGITIS PRESENCE NOT SPECIFIED: ICD-10-CM

## 2019-12-10 DIAGNOSIS — E55.9 VITAMIN D DEFICIENCY: ICD-10-CM

## 2019-12-10 DIAGNOSIS — E78.5 HYPERLIPIDEMIA, UNSPECIFIED HYPERLIPIDEMIA TYPE: ICD-10-CM

## 2019-12-10 DIAGNOSIS — I10 ESSENTIAL HYPERTENSION: ICD-10-CM

## 2019-12-10 DIAGNOSIS — Z00.00 MEDICARE ANNUAL WELLNESS VISIT, SUBSEQUENT: Primary | ICD-10-CM

## 2019-12-10 PROCEDURE — G0439 PPPS, SUBSEQ VISIT: HCPCS | Performed by: INTERNAL MEDICINE

## 2019-12-10 PROCEDURE — 99214 OFFICE O/P EST MOD 30 MIN: CPT | Performed by: INTERNAL MEDICINE

## 2019-12-10 NOTE — PATIENT INSTRUCTIONS
Medicare Wellness  Personal Prevention Plan of Service     Date of Office Visit:  12/10/2019  Encounter Provider:  Mallika Stewart MD  Place of Service:  [facility] INTERNAL MEDICINE  Patient Name: Dian Jefferson  :  1950    As part of the Medicare Wellness portion of your visit today, we are providing you with this personalized preventive plan of services (PPPS). This plan is based upon recommendations of the [place] Preventive Services Task Force (USPSTF) and the Advisory Committee on Immunization Practices (ACIP).    This lists the preventive care services that should be considered, and provides dates of when you are due. Items listed as completed are up-to-date and do not require any further intervention.    Health Maintenance   Topic Date Due   • PNEUMOCOCCAL VACCINES (65+ LOW/MEDIUM RISK) (2 of 2 - PPSV23) 2018   • MEDICARE ANNUAL WELLNESS  2019   • ZOSTER VACCINE (2 of 2) 2020 (Originally 2017)   • COLONOSCOPY  2020   • LIPID PANEL  2020   • MAMMOGRAM  2021   • TDAP/TD VACCINES (2 - Td) 2027   • HEPATITIS C SCREENING  Completed   • INFLUENZA VACCINE  Addressed       Orders Placed This Encounter   Procedures   • Comprehensive Metabolic Panel     Standing Status:   Future     Standing Expiration Date:   12/10/2020   • LP+LDL / HDL Ratio ([facility])     Standing Status:   Future     Standing Expiration Date:   12/10/2020   • TSH Rfx On Abnormal To Free T4     Standing Status:   Future     Standing Expiration Date:   12/10/2020   • Vitamin D 25 Hydroxy     Standing Status:   Future     Standing Expiration Date:   12/10/2020   • CBC & Differential     Standing Status:   Future     Standing Expiration Date:   12/10/2020     Order Specific Question:   Manual Differential     Answer:   No       No follow-ups on file.          Fall Prevention in the Home, Adult  Falls can cause injuries. They can happen to people of all ages. There are many  things you can do to make your home safe and to help prevent falls. Ask for help when making these changes, if needed.  What actions can I take to prevent falls?  General Instructions  · Use good lighting in all rooms. Replace any light bulbs that burn out.  · Turn on the lights when you go into a dark area. Use night-lights.  · Keep items that you use often in easy-to-reach places. Lower the shelves around your home if necessary.  · Set up your furniture so you have a clear path. Avoid moving your furniture around.  · Do not have throw rugs and other things on the floor that can make you trip.  · Avoid walking on wet floors.  · If any of your floors are uneven, fix them.  · Add color or contrast paint or tape to clearly mason and help you see:  ? Any grab bars or handrails.  ? First and last steps of stairways.  ? Where the edge of each step is.  · If you use a stepladder:  ? Make sure that it is fully opened. Do not climb a closed stepladder.  ? Make sure that both sides of the stepladder are locked into place.  ? Ask someone to hold the stepladder for you while you use it.  · If there are any pets around you, be aware of where they are.  What can I do in the bathroom?         · Keep the floor dry. Clean up any water that spills onto the floor as soon as it happens.  · Remove soap buildup in the tub or shower regularly.  · Use non-skid mats or decals on the floor of the tub or shower.  · Attach bath mats securely with double-sided, non-slip rug tape.  · If you need to sit down in the shower, use a plastic, non-slip stool.  · Install grab bars by the toilet and in the tub and shower. Do not use towel bars as grab bars.  What can I do in the bedroom?  · Make sure that you have a light by your bed that is easy to reach.  · Do not use any sheets or blankets that are too big for your bed. They should not hang down onto the floor.  · Have a firm chair that has side arms. You can use this for support while you get  dressed.  What can I do in the kitchen?  · Clean up any spills right away.  · If you need to reach something above you, use a strong step stool that has a grab bar.  · Keep electrical cords out of the way.  · Do not use floor polish or wax that makes floors slippery. If you must use wax, use non-skid floor wax.  What can I do with my stairs?  · Do not leave any items on the stairs.  · Make sure that you have a light switch at the top of the stairs and the bottom of the stairs. If you do not have them, ask someone to add them for you.  · Make sure that there are handrails on both sides of the stairs, and use them. Fix handrails that are broken or loose. Make sure that handrails are as long as the stairways.  · Install non-slip stair treads on all stairs in your home.  · Avoid having throw rugs at the top or bottom of the stairs. If you do have throw rugs, attach them to the floor with carpet tape.  · Choose a carpet that does not hide the edge of the steps on the stairway.  · Check any carpeting to make sure that it is firmly attached to the stairs. Fix any carpet that is loose or worn.  What can I do on the outside of my home?  · Use bright outdoor lighting.  · Regularly fix the edges of walkways and driveways and fix any cracks.  · Remove anything that might make you trip as you walk through a door, such as a raised step or threshold.  · Trim any bushes or trees on the path to your home.  · Regularly check to see if handrails are loose or broken. Make sure that both sides of any steps have handrails.  · Install guardrails along the edges of any raised decks and porches.  · Clear walking paths of anything that might make someone trip, such as tools or rocks.  · Have any leaves, snow, or ice cleared regularly.  · Use sand or salt on walking paths during winter.  · Clean up any spills in your garage right away. This includes grease or oil spills.  What other actions can I take?  · Wear shoes that:  ? Have a low heel.  Do not wear high heels.  ? Have rubber bottoms.  ? Are comfortable and fit you well.  ? Are closed at the toe. Do not wear open-toe sandals.  · Use tools that help you move around (mobility aids) if they are needed. These include:  ? Canes.  ? Walkers.  ? Scooters.  ? Crutches.  · Review your medicines with your doctor. Some medicines can make you feel dizzy. This can increase your chance of falling.  Ask your doctor what other things you can do to help prevent falls.  Where to find more information  · Centers for Disease Control and Prevention, STEAD[employer]: https://cdc.gov  · National [place] on Aging: https://sy4hpua.ariel.nih.gov  Contact a doctor if:  · You are afraid of falling at home.  · You feel weak, drowsy, or dizzy at home.  · You fall at home.  Summary  · There are many simple things that you can do to make your home safe and to help prevent falls.  · Ways to make your home safe include removing tripping hazards and installing grab bars in the bathroom.  · Ask for help when making these changes in your home.  This information is not intended to replace advice given to you by your health care provider. Make sure you discuss any questions you have with your health care provider.  Document Released: 10/14/2010 Document Revised: 08/02/2018 Document Reviewed: 08/02/2018  [employer]vier Interactive Patient Education © 2019 [employer]

## 2019-12-10 NOTE — PROGRESS NOTES
The ABCs of the Annual Wellness Visit  Subsequent Medicare Wellness Visit    No chief complaint on file.      Subjective   History of Present Illness:  Dian Jefferson is a 69 y.o. female who presents for a Subsequent Medicare Wellness Visit.    HEALTH RISK ASSESSMENT    Recent Hospitalizations:  No hospitalization(s) within the last year.    Current Medical Providers:  Patient Care Team:  Mallika Stewart MD as PCP - General (Internal Medicine)  Mallika Stewart MD as PCP - Claims Attributed  Britney Cole MD as Consulting Physician (Obstetrics and Gynecology)    Smoking Status:  Social History     Tobacco Use   Smoking Status Never Smoker   Smokeless Tobacco Never Used       Alcohol Consumption:  Social History     Substance and Sexual Activity   Alcohol Use Yes    Comment: OCC. USE       Depression Screen:   PHQ-2/PHQ-9 Depression Screening 12/10/2019   Little interest or pleasure in doing things 0   Feeling down, depressed, or hopeless 0   Trouble falling or staying asleep, or sleeping too much 0   Feeling tired or having little energy 0   Poor appetite or overeating 0   Feeling bad about yourself - or that you are a failure or have let yourself or your family down 0   Trouble concentrating on things, such as reading the newspaper or watching television 0   Moving or speaking so slowly that other people could have noticed. Or the opposite - being so fidgety or restless that you have been moving around a lot more than usual 0   Thoughts that you would be better off dead, or of hurting yourself in some way 0   Total Score 0   If you checked off any problems, how difficult have these problems made it for you to do your work, take care of things at home, or get along with other people? -       Fall Risk Screen:  STEADI Fall Risk Assessment was completed, and patient is at LOW risk for falls.Assessment completed on:12/10/2019    Health Habits and Functional and Cognitive Screening:  Functional & Cognitive Status 12/10/2019    Do you have difficulty preparing food and eating? No   Do you have difficulty bathing yourself, getting dressed or grooming yourself? No   Do you have difficulty using the toilet? No   Do you have difficulty moving around from place to place? No   Do you have trouble with steps or getting out of a bed or a chair? No   Current Diet (No Data)   Dental Exam Up to date   Eye Exam Up to date   Exercise (times per week) 5 times per week   Current Exercise Activities Include Walking   Do you need help using the phone?  No   Are you deaf or do you have serious difficulty hearing?  No   Do you need help with transportation? No   Do you need help shopping? No   Do you need help preparing meals?  No   Do you need help with housework?  No   Do you need help with laundry? No   Do you need help taking your medications? No   Do you need help managing money? No   Do you ever drive or ride in a car without wearing a seat belt? No   Have you felt unusual stress, anger or loneliness in the last month? Yes   Who do you live with? Spouse   If you need help, do you have trouble finding someone available to you? No   Have you been bothered in the last four weeks by sexual problems? No   Do you have difficulty concentrating, remembering or making decisions? No         Does the patient have evidence of cognitive impairment? No    Asprin use counseling:Does not need ASA (and currently is not on it)    Age-appropriate Screening Schedule:  Refer to the list below for future screening recommendations based on patient's age, sex and/or medical conditions. Orders for these recommended tests are listed in the plan section. The patient has been provided with a written plan.    Health Maintenance   Topic Date Due   • PNEUMOCOCCAL VACCINES (65+ LOW/MEDIUM RISK) (2 of 2 - PPSV23) 07/13/2018   • ZOSTER VACCINE (2 of 2) 06/12/2020 (Originally 9/7/2017)   • COLONOSCOPY  05/22/2020   • LIPID PANEL  12/04/2020   • MAMMOGRAM  01/03/2021   • TDAP/TD  VACCINES (2 - Td) 07/13/2027   • INFLUENZA VACCINE  Addressed          The following portions of the patient's history were reviewed and updated as appropriate: allergies, current medications, past family history, past medical history, past social history, past surgical history and problem list.    Outpatient Medications Prior to Visit   Medication Sig Dispense Refill   • cetirizine (ZyrTEC) 10 MG tablet Take 1 tablet by mouth daily.     • Cholecalciferol (VITAMIN D3) 2000 UNITS capsule Take 1 capsule by mouth daily.     • fenofibrate micronized (ANTARA) 130 MG capsule TAKE 1 CAPSULE BY MOUTH EVERY DAY IN THE MORNING BEFORE BREAKFAST 90 capsule 1   • lisinopril (PRINIVIL,ZESTRIL) 20 MG tablet TAKE 1 TABLET BY MOUTH EVERY DAY 90 tablet 1   • omeprazole (priLOSEC) 40 MG capsule TAKE 1 CAPSULE BY MOUTH EVERY DAY 90 capsule 1   • rosuvastatin (CRESTOR) 40 MG tablet TAKE 1 TABLET BY MOUTH EVERY DAY AT NIGHT 90 tablet 1   • SYNTHROID 25 MCG tablet TAKE 1 TABLET BY MOUTH EVERY DAY 90 tablet 1   • vilazodone (VIIBRYD) 40 MG tablet tablet Take 1 tablet by mouth Daily. 90 tablet 1   • YUVAFEM 10 MCG tablet vaginal tablet Insert 1 tablet into the vagina 2 (Two) Times a Week. 24 tablet 1   • meloxicam (MOBIC) 7.5 MG tablet TAKE 1 TABLET BY MOUTH EVERY DAY WITH FOOD 90 tablet 1     No facility-administered medications prior to visit.        Patient Active Problem List   Diagnosis   • Anxiety   • Gastroesophageal reflux disease   • Menopausal flushing   • Essential hypertension   • Hyperlipidemia   • Hypothyroidism   • Abnormal mammogram   • Breast implant rupture   • Vitamin D deficiency   • Tubular adenoma of colon   • Sigmoid diverticulosis   • Internal hemorrhoids   • DDD (degenerative disc disease), lumbar   • Spinal stenosis, lumbar region, with neurogenic claudication       Advanced Care Planning:  Patient has an advance directive - a copy has not been provided. Have asked the patient to send this to us to add to  "record    Review of Systems    Compared to one year ago, the patient feels her physical health is the same.except back is worse  Compared to one year ago, the patient feels her mental health is the same.    Reviewed chart for potential of high risk medication in the elderly: yes  Reviewed chart for potential of harmful drug interactions in the elderly:yes    Objective         Vitals:    12/10/19 1429   BP: 116/78   BP Location: Left arm   Patient Position: Sitting   Pulse: 68   Temp: 98.5 °F (36.9 °C)   TempSrc: Oral   SpO2: 97%   Weight: 87 kg (191 lb 14.4 oz)   Height: 165.1 cm (65\")   PainSc: 0-No pain       Body mass index is 31.93 kg/m².  Discussed the patient's BMI with her. The BMI is above average; BMI management plan is completed.    Physical Exam    Lab Results   Component Value Date    GLU 82 12/04/2019    CHLPL 193 12/04/2019    TRIG 119 12/04/2019    HDL 54 12/04/2019     (H) 12/04/2019    VLDL 23.8 12/04/2019    HGBA1C 5.90 (H) 12/04/2019        Assessment/Plan   Medicare Risks and Personalized Health Plan  CMS Preventative Services Quick Reference  Immunizations Discussed/Encouraged (specific immunizations; Prevnar )    The above risks/problems have been discussed with the patient.  Pertinent information has been shared with the patient in the After Visit Summary.  Follow up plans and orders are seen below in the Assessment/Plan Section.    Diagnoses and all orders for this visit:    1. Medicare annual wellness visit, subsequent (Primary)    2. Essential hypertension  -     Comprehensive Metabolic Panel; Future  -     LP+LDL / HDL Ratio ([facility]); Future  -     TSH Rfx On Abnormal To Free T4; Future  -     CBC & Differential; Future  -     Vitamin D 25 Hydroxy; Future    3. Hyperlipidemia, unspecified hyperlipidemia type  -     Comprehensive Metabolic Panel; Future  -     LP+LDL / HDL Ratio ([facility]); Future  -     TSH Rfx On Abnormal To Free T4; Future  -     CBC & Differential; Future  -     " Vitamin D 25 Hydroxy; Future    4. Gastroesophageal reflux disease, esophagitis presence not specified    5. Vitamin D deficiency  -     Vitamin D 25 Hydroxy; Future      Follow Up:  No follow-ups on file.     An After Visit Summary and PPPS were given to the patient.     I have rec some increased PA as tolerated  She is doing better with her back  She has decided not to the epidural

## 2019-12-10 NOTE — PROGRESS NOTES
Subjective   Dian Jefferson is a 69 y.o. female here today to f/u on HTN, hypothyroidism and hyperlipidemia.  Pt would like to discuss the results from Cardiology.  Pt c/o a pain in the right thigh.    History of Present Illness   Pt has been taking BP meds as prescribed without any problems.  No HA  No episodes of orthostasis  Pt has been taking cholesterol meds as prescribed.  No difficulties with myalgias.   She has seen cards and pulm for her SOB.  PUENTE was neg    The following portions of the patient's history were reviewed and updated as appropriate: allergies, current medications, past medical history, past social history and problem list.  She is going to start yoga    Review of Systems   All other systems reviewed and are negative.      Objective   Physical Exam   Constitutional: She is oriented to person, place, and time. She appears well-developed and well-nourished.   HENT:   Head: Normocephalic and atraumatic.   Right Ear: External ear normal.   Left Ear: External ear normal.   Mouth/Throat: Oropharynx is clear and moist.   Eyes: Pupils are equal, round, and reactive to light. Conjunctivae and EOM are normal.   Neck: Normal range of motion. No tracheal deviation present. No thyromegaly present.   Cardiovascular: Normal rate, regular rhythm, normal heart sounds and intact distal pulses.   Pulmonary/Chest: Effort normal and breath sounds normal.   Abdominal: Soft. Bowel sounds are normal. She exhibits no distension. There is no tenderness.   Musculoskeletal: Normal range of motion. She exhibits no edema or deformity.   Neurological: She is alert and oriented to person, place, and time.   Skin: Skin is warm and dry.   Psychiatric: She has a normal mood and affect. Her behavior is normal. Judgment and thought content normal.   Vitals reviewed.      Vitals:    12/10/19 1429   BP: 116/78   Pulse: 68   Temp: 98.5 °F (36.9 °C)   SpO2: 97%     Body mass index is 31.93 kg/m².       Results Encounter on 12/04/2019    Component Date Value Ref Range Status   • Glucose 12/04/2019 82  65 - 99 mg/dL Final   • BUN 12/04/2019 16  8 - 23 mg/dL Final   • Creatinine 12/04/2019 0.93  0.57 - 1.00 mg/dL Final   • eGFR Non African Am 12/04/2019 60* >60 mL/min/1.73 Final   • eGFR African Am 12/04/2019 73  >60 mL/min/1.73 Final   • BUN/Creatinine Ratio 12/04/2019 17.2  7.0 - 25.0 Final   • Sodium 12/04/2019 142  136 - 145 mmol/L Final   • Potassium 12/04/2019 4.3  3.5 - 5.2 mmol/L Final   • Chloride 12/04/2019 103  98 - 107 mmol/L Final   • Total CO2 12/04/2019 24.5  22.0 - 29.0 mmol/L Final   • Calcium 12/04/2019 9.5  8.6 - 10.5 mg/dL Final   • Total Protein 12/04/2019 6.8  6.0 - 8.5 g/dL Final   • Albumin 12/04/2019 4.90  3.50 - 5.20 g/dL Final   • Globulin 12/04/2019 1.9  gm/dL Final   • A/G Ratio 12/04/2019 2.6  g/dL Final   • Total Bilirubin 12/04/2019 0.4  0.2 - 1.2 mg/dL Final   • Alkaline Phosphatase 12/04/2019 40  39 - 117 U/L Final   • AST (SGOT) 12/04/2019 25  1 - 32 U/L Final   • ALT (SGPT) 12/04/2019 18  1 - 33 U/L Final   • Total Cholesterol 12/04/2019 193  0 - 200 mg/dL Final   • Triglycerides 12/04/2019 119  0 - 150 mg/dL Final   • HDL Cholesterol 12/04/2019 54  40 - 60 mg/dL Final   • VLDL Cholesterol 12/04/2019 23.8  mg/dL Final   • LDL Cholesterol  12/04/2019 115* 0 - 100 mg/dL Final   • LDL/HDL Ratio 12/04/2019 2.13   Final   • TSH 12/04/2019 2.930  0.270 - 4.200 uIU/mL Final   • WBC 12/04/2019 5.19  3.40 - 10.80 10*3/mm3 Final   • RBC 12/04/2019 4.34  3.77 - 5.28 10*6/mm3 Final   • Hemoglobin 12/04/2019 12.5  12.0 - 15.9 g/dL Final   • Hematocrit 12/04/2019 38.7  34.0 - 46.6 % Final   • MCV 12/04/2019 89.2  79.0 - 97.0 fL Final   • MCH 12/04/2019 28.8  26.6 - 33.0 pg Final   • MCHC 12/04/2019 32.3  31.5 - 35.7 g/dL Final   • RDW 12/04/2019 12.6  12.3 - 15.4 % Final   • Platelets 12/04/2019 386  140 - 450 10*3/mm3 Final   • Neutrophil Rel % 12/04/2019 55.6  42.7 - 76.0 % Final   • Lymphocyte Rel % 12/04/2019 31.8  19.6 -  45.3 % Final   • Monocyte Rel % 12/04/2019 7.9  5.0 - 12.0 % Final   • Eosinophil Rel % 12/04/2019 3.7  0.3 - 6.2 % Final   • Basophil Rel % 12/04/2019 0.8  0.0 - 1.5 % Final   • Neutrophils Absolute 12/04/2019 2.89  1.70 - 7.00 10*3/mm3 Final   • Lymphocytes Absolute 12/04/2019 1.65  0.70 - 3.10 10*3/mm3 Final   • Monocytes Absolute 12/04/2019 0.41  0.10 - 0.90 10*3/mm3 Final   • Eosinophils Absolute 12/04/2019 0.19  0.00 - 0.40 10*3/mm3 Final   • Basophils Absolute 12/04/2019 0.04  0.00 - 0.20 10*3/mm3 Final   • Immature Granulocyte Rel % 12/04/2019 0.2  0.0 - 0.5 % Final   • Immature Grans Absolute 12/04/2019 0.01  0.00 - 0.05 10*3/mm3 Final   • nRBC 12/04/2019 0.0  0.0 - 0.2 /100 WBC Final   • Hemoglobin A1C 12/04/2019 5.90* 4.80 - 5.60 % Final    Comment: Hemoglobin A1C Ranges:  Increased Risk for Diabetes  5.7% to 6.4%  Diabetes                     >= 6.5%  Diabetic Goal                < 7.0%         Current Outpatient Medications:   •  cetirizine (ZyrTEC) 10 MG tablet, Take 1 tablet by mouth daily., Disp: , Rfl:   •  Cholecalciferol (VITAMIN D3) 2000 UNITS capsule, Take 1 capsule by mouth daily., Disp: , Rfl:   •  fenofibrate micronized (ANTARA) 130 MG capsule, TAKE 1 CAPSULE BY MOUTH EVERY DAY IN THE MORNING BEFORE BREAKFAST, Disp: 90 capsule, Rfl: 1  •  lisinopril (PRINIVIL,ZESTRIL) 20 MG tablet, TAKE 1 TABLET BY MOUTH EVERY DAY, Disp: 90 tablet, Rfl: 1  •  omeprazole (priLOSEC) 40 MG capsule, TAKE 1 CAPSULE BY MOUTH EVERY DAY, Disp: 90 capsule, Rfl: 1  •  rosuvastatin (CRESTOR) 40 MG tablet, TAKE 1 TABLET BY MOUTH EVERY DAY AT NIGHT, Disp: 90 tablet, Rfl: 1  •  SYNTHROID 25 MCG tablet, TAKE 1 TABLET BY MOUTH EVERY DAY, Disp: 90 tablet, Rfl: 1  •  vilazodone (VIIBRYD) 40 MG tablet tablet, Take 1 tablet by mouth Daily., Disp: 90 tablet, Rfl: 1  •  YUVAFEM 10 MCG tablet vaginal tablet, Insert 1 tablet into the vagina 2 (Two) Times a Week., Disp: 24 tablet, Rfl: 1      Assessment/Plan   Diagnoses and all  orders for this visit:    Medicare annual wellness visit, subsequent    Essential hypertension    Hyperlipidemia, unspecified hyperlipidemia type    Gastroesophageal reflux disease, esophagitis presence not specified    1.  HTN- ok with current meds  2.  HPL- ok with crestor stop fenofibrate  3. SOB--notices this with exertion  She needs to FU with pulm  4.  GERD- ok with current meds  5.  Hypothyroidism with current meds  6.   LBP-  I have rec cot PT exercises at home  She has declined epidurals as she is better

## 2019-12-12 RX ORDER — FENOFIBRATE 130 MG/1
CAPSULE ORAL
Qty: 90 CAPSULE | Refills: 1 | Status: SHIPPED | OUTPATIENT
Start: 2019-12-12 | End: 2020-05-11

## 2019-12-12 RX ORDER — VILAZODONE HYDROCHLORIDE 40 MG/1
TABLET ORAL
Qty: 90 TABLET | Refills: 1 | Status: SHIPPED | OUTPATIENT
Start: 2019-12-12 | End: 2020-05-07 | Stop reason: SDUPTHER

## 2020-02-06 DIAGNOSIS — E78.5 HYPERLIPIDEMIA, UNSPECIFIED HYPERLIPIDEMIA TYPE: ICD-10-CM

## 2020-02-06 RX ORDER — ROSUVASTATIN CALCIUM 40 MG/1
TABLET, COATED ORAL
Qty: 90 TABLET | Refills: 1 | Status: SHIPPED | OUTPATIENT
Start: 2020-02-06 | End: 2020-07-28

## 2020-02-06 RX ORDER — LISINOPRIL 20 MG/1
TABLET ORAL
Qty: 90 TABLET | Refills: 1 | Status: SHIPPED | OUTPATIENT
Start: 2020-02-06 | End: 2020-07-28

## 2020-03-10 ENCOUNTER — APPOINTMENT (OUTPATIENT)
Dept: WOMENS IMAGING | Facility: HOSPITAL | Age: 70
End: 2020-03-10

## 2020-03-10 PROCEDURE — 77063 BREAST TOMOSYNTHESIS BI: CPT | Performed by: RADIOLOGY

## 2020-03-10 PROCEDURE — 77067 SCR MAMMO BI INCL CAD: CPT | Performed by: RADIOLOGY

## 2020-03-16 RX ORDER — LEVOTHYROXINE SODIUM 25 MCG
TABLET ORAL
Qty: 90 TABLET | Refills: 1 | Status: SHIPPED | OUTPATIENT
Start: 2020-03-16 | End: 2020-09-21

## 2020-04-10 ENCOUNTER — RESULTS ENCOUNTER (OUTPATIENT)
Dept: INTERNAL MEDICINE | Facility: CLINIC | Age: 70
End: 2020-04-10

## 2020-04-10 DIAGNOSIS — E55.9 VITAMIN D DEFICIENCY: ICD-10-CM

## 2020-04-10 DIAGNOSIS — I10 ESSENTIAL HYPERTENSION: ICD-10-CM

## 2020-04-10 DIAGNOSIS — E78.5 HYPERLIPIDEMIA, UNSPECIFIED HYPERLIPIDEMIA TYPE: ICD-10-CM

## 2020-05-07 ENCOUNTER — TELEPHONE (OUTPATIENT)
Dept: INTERNAL MEDICINE | Facility: CLINIC | Age: 70
End: 2020-05-07

## 2020-05-07 RX ORDER — VILAZODONE HYDROCHLORIDE 40 MG/1
40 TABLET ORAL DAILY
Qty: 90 TABLET | Refills: 1 | Status: SHIPPED | OUTPATIENT
Start: 2020-05-07 | End: 2021-03-01

## 2020-05-07 NOTE — TELEPHONE ENCOUNTER
RX SENT TO PHARMACY    ----- Message from Terry Mendez Rep sent at 5/7/2020 12:36 PM EDT -----  Patient needs refill sent to [facility] for:  VIIBRYD 40 MG tablet tablet

## 2020-05-08 LAB
25(OH)D3+25(OH)D2 SERPL-MCNC: 29.1 NG/ML (ref 30–100)
ALBUMIN SERPL-MCNC: 4.6 G/DL (ref 3.5–5.2)
ALBUMIN/GLOB SERPL: 2.2 G/DL
ALP SERPL-CCNC: 55 U/L (ref 39–117)
ALT SERPL-CCNC: 36 U/L (ref 1–33)
AST SERPL-CCNC: 25 U/L (ref 1–32)
BASOPHILS # BLD AUTO: 0.04 10*3/MM3 (ref 0–0.2)
BASOPHILS NFR BLD AUTO: 0.7 % (ref 0–1.5)
BILIRUB SERPL-MCNC: 0.5 MG/DL (ref 0.2–1.2)
BUN SERPL-MCNC: 17 MG/DL (ref 8–23)
BUN/CREAT SERPL: 18.3 (ref 7–25)
CALCIUM SERPL-MCNC: 9.5 MG/DL (ref 8.6–10.5)
CHLORIDE SERPL-SCNC: 105 MMOL/L (ref 98–107)
CHOLEST SERPL-MCNC: 181 MG/DL (ref 0–200)
CO2 SERPL-SCNC: 21.6 MMOL/L (ref 22–29)
CREAT SERPL-MCNC: 0.93 MG/DL (ref 0.57–1)
EOSINOPHIL # BLD AUTO: 0.12 10*3/MM3 (ref 0–0.4)
EOSINOPHIL NFR BLD AUTO: 2.1 % (ref 0.3–6.2)
ERYTHROCYTE [DISTWIDTH] IN BLOOD BY AUTOMATED COUNT: 12.8 % (ref 12.3–15.4)
GLOBULIN SER CALC-MCNC: 2.1 GM/DL
GLUCOSE SERPL-MCNC: 102 MG/DL (ref 65–99)
HCT VFR BLD AUTO: 38.2 % (ref 34–46.6)
HDLC SERPL-MCNC: 44 MG/DL (ref 40–60)
HGB BLD-MCNC: 12.8 G/DL (ref 12–15.9)
IMM GRANULOCYTES # BLD AUTO: 0.01 10*3/MM3 (ref 0–0.05)
IMM GRANULOCYTES NFR BLD AUTO: 0.2 % (ref 0–0.5)
LDLC SERPL CALC-MCNC: 101 MG/DL (ref 0–100)
LDLC/HDLC SERPL: 2.29 {RATIO}
LYMPHOCYTES # BLD AUTO: 1.76 10*3/MM3 (ref 0.7–3.1)
LYMPHOCYTES NFR BLD AUTO: 31.4 % (ref 19.6–45.3)
MCH RBC QN AUTO: 29.8 PG (ref 26.6–33)
MCHC RBC AUTO-ENTMCNC: 33.5 G/DL (ref 31.5–35.7)
MCV RBC AUTO: 89 FL (ref 79–97)
MONOCYTES # BLD AUTO: 0.44 10*3/MM3 (ref 0.1–0.9)
MONOCYTES NFR BLD AUTO: 7.9 % (ref 5–12)
NEUTROPHILS # BLD AUTO: 3.23 10*3/MM3 (ref 1.7–7)
NEUTROPHILS NFR BLD AUTO: 57.7 % (ref 42.7–76)
NRBC BLD AUTO-RTO: 0 /100 WBC (ref 0–0.2)
PLATELET # BLD AUTO: 313 10*3/MM3 (ref 140–450)
POTASSIUM SERPL-SCNC: 4.4 MMOL/L (ref 3.5–5.2)
PROT SERPL-MCNC: 6.7 G/DL (ref 6–8.5)
RBC # BLD AUTO: 4.29 10*6/MM3 (ref 3.77–5.28)
SODIUM SERPL-SCNC: 144 MMOL/L (ref 136–145)
TRIGL SERPL-MCNC: 182 MG/DL (ref 0–150)
TSH SERPL DL<=0.005 MIU/L-ACNC: 2.15 UIU/ML (ref 0.27–4.2)
VLDLC SERPL CALC-MCNC: 36.4 MG/DL (ref 5–40)
WBC # BLD AUTO: 5.6 10*3/MM3 (ref 3.4–10.8)

## 2020-05-11 ENCOUNTER — TELEMEDICINE (OUTPATIENT)
Dept: INTERNAL MEDICINE | Facility: CLINIC | Age: 70
End: 2020-05-11

## 2020-05-11 VITALS — OXYGEN SATURATION: 97 % | DIASTOLIC BLOOD PRESSURE: 89 MMHG | SYSTOLIC BLOOD PRESSURE: 122 MMHG | HEART RATE: 73 BPM

## 2020-05-11 DIAGNOSIS — E78.5 HYPERLIPIDEMIA, UNSPECIFIED HYPERLIPIDEMIA TYPE: ICD-10-CM

## 2020-05-11 DIAGNOSIS — E03.9 ACQUIRED HYPOTHYROIDISM: ICD-10-CM

## 2020-05-11 DIAGNOSIS — E55.9 VITAMIN D DEFICIENCY: ICD-10-CM

## 2020-05-11 DIAGNOSIS — K21.9 GASTROESOPHAGEAL REFLUX DISEASE, ESOPHAGITIS PRESENCE NOT SPECIFIED: ICD-10-CM

## 2020-05-11 DIAGNOSIS — I10 ESSENTIAL HYPERTENSION: Primary | ICD-10-CM

## 2020-05-11 PROCEDURE — 99214 OFFICE O/P EST MOD 30 MIN: CPT | Performed by: INTERNAL MEDICINE

## 2020-05-11 NOTE — PROGRESS NOTES
Subjective   Dian Jefferson is a 70 y.o. female seen today to f/u on HTN, hyperlipidemia, hypothyroidism, anxiety and vitamin d def.       History of Present Illness   Pt has been taking BP meds as prescribed without any problems.  No HA  No episodes of orthostasis  Pt has been taking cholesterol meds as prescribed.  No difficulties with myalgias. Off the fenofibrate  Pt has been doing well with thyroid meds.  Taking as perscribed without any complications  She has been doing well with the vybridd      The following portions of the patient's history were reviewed and updated as appropriate: allergies, current medications, past medical history, past social history and problem list.  She has been walking   Reg    Review of Systems   All other systems reviewed and are negative.      Objective   Physical Exam   Constitutional: She is oriented to person, place, and time. She appears well-developed and well-nourished.   Neurological: She is alert and oriented to person, place, and time.   Psychiatric: She has a normal mood and affect. Her behavior is normal. Judgment and thought content normal.   Vitals reviewed.      Vitals:    05/11/20 1338   BP: 122/89   Pulse: 73   SpO2: 97%     There is no height or weight on file to calculate BMI.       Results Encounter on 04/10/2020   Component Date Value Ref Range Status   • Glucose 05/07/2020 102* 65 - 99 mg/dL Final   • BUN 05/07/2020 17  8 - 23 mg/dL Final   • Creatinine 05/07/2020 0.93  0.57 - 1.00 mg/dL Final   • eGFR Non African Am 05/07/2020 60* >60 mL/min/1.73 Final   • eGFR African Am 05/07/2020 73  >60 mL/min/1.73 Final   • BUN/Creatinine Ratio 05/07/2020 18.3  7.0 - 25.0 Final   • Sodium 05/07/2020 144  136 - 145 mmol/L Final   • Potassium 05/07/2020 4.4  3.5 - 5.2 mmol/L Final   • Chloride 05/07/2020 105  98 - 107 mmol/L Final   • Total CO2 05/07/2020 21.6* 22.0 - 29.0 mmol/L Final   • Calcium 05/07/2020 9.5  8.6 - 10.5 mg/dL Final   • Total Protein 05/07/2020 6.7  6.0  - 8.5 g/dL Final   • Albumin 05/07/2020 4.60  3.50 - 5.20 g/dL Final   • Globulin 05/07/2020 2.1  gm/dL Final   • A/G Ratio 05/07/2020 2.2  g/dL Final   • Total Bilirubin 05/07/2020 0.5  0.2 - 1.2 mg/dL Final   • Alkaline Phosphatase 05/07/2020 55  39 - 117 U/L Final   • AST (SGOT) 05/07/2020 25  1 - 32 U/L Final   • ALT (SGPT) 05/07/2020 36* 1 - 33 U/L Final   • Total Cholesterol 05/07/2020 181  0 - 200 mg/dL Final   • Triglycerides 05/07/2020 182* 0 - 150 mg/dL Final   • HDL Cholesterol 05/07/2020 44  40 - 60 mg/dL Final   • VLDL Cholesterol 05/07/2020 36.4  5 - 40 mg/dL Final   • LDL Cholesterol  05/07/2020 101* 0 - 100 mg/dL Final   • LDL/HDL Ratio 05/07/2020 2.29   Final   • TSH 05/07/2020 2.150  0.270 - 4.200 uIU/mL Final   • WBC 05/07/2020 5.60  3.40 - 10.80 10*3/mm3 Final   • RBC 05/07/2020 4.29  3.77 - 5.28 10*6/mm3 Final   • Hemoglobin 05/07/2020 12.8  12.0 - 15.9 g/dL Final   • Hematocrit 05/07/2020 38.2  34.0 - 46.6 % Final   • MCV 05/07/2020 89.0  79.0 - 97.0 fL Final   • MCH 05/07/2020 29.8  26.6 - 33.0 pg Final   • MCHC 05/07/2020 33.5  31.5 - 35.7 g/dL Final   • RDW 05/07/2020 12.8  12.3 - 15.4 % Final   • Platelets 05/07/2020 313  140 - 450 10*3/mm3 Final   • Neutrophil Rel % 05/07/2020 57.7  42.7 - 76.0 % Final   • Lymphocyte Rel % 05/07/2020 31.4  19.6 - 45.3 % Final   • Monocyte Rel % 05/07/2020 7.9  5.0 - 12.0 % Final   • Eosinophil Rel % 05/07/2020 2.1  0.3 - 6.2 % Final   • Basophil Rel % 05/07/2020 0.7  0.0 - 1.5 % Final   • Neutrophils Absolute 05/07/2020 3.23  1.70 - 7.00 10*3/mm3 Final   • Lymphocytes Absolute 05/07/2020 1.76  0.70 - 3.10 10*3/mm3 Final   • Monocytes Absolute 05/07/2020 0.44  0.10 - 0.90 10*3/mm3 Final   • Eosinophils Absolute 05/07/2020 0.12  0.00 - 0.40 10*3/mm3 Final   • Basophils Absolute 05/07/2020 0.04  0.00 - 0.20 10*3/mm3 Final   • Immature Granulocyte Rel % 05/07/2020 0.2  0.0 - 0.5 % Final   • Immature Grans Absolute 05/07/2020 0.01  0.00 - 0.05 10*3/mm3 Final    • nRBC 05/07/2020 0.0  0.0 - 0.2 /100 WBC Final   • 25 Hydroxy, Vitamin D 05/07/2020 29.1* 30.0 - 100.0 ng/ml Final    Comment: Results may be falsely increased if patient taking Biotin.  Reference Range for Total Vitamin D 25(OH)  Deficiency <20.0 ng/mL  Insufficiency 21-29 ng/mL  Sufficiency  ng/mL  Toxicity >100 ng/ml         Current Outpatient Medications:   •  cetirizine (ZyrTEC) 10 MG tablet, Take 1 tablet by mouth daily., Disp: , Rfl:   •  Cholecalciferol (VITAMIN D3) 2000 UNITS capsule, Take 1 capsule by mouth daily., Disp: , Rfl:   •  lisinopril (PRINIVIL,ZESTRIL) 20 MG tablet, TAKE 1 TABLET BY MOUTH EVERY DAY, Disp: 90 tablet, Rfl: 1  •  rosuvastatin (CRESTOR) 40 MG tablet, TAKE 1 TABLET BY MOUTH EVERY DAY EVERY NIGHT, Disp: 90 tablet, Rfl: 1  •  SYNTHROID 25 MCG tablet, TAKE 1 TABLET BY MOUTH EVERY DAY, Disp: 90 tablet, Rfl: 1  •  vilazodone (Viibryd) 40 MG tablet tablet, Take 1 tablet by mouth Daily., Disp: 90 tablet, Rfl: 1  •  YUVAFEM 10 MCG tablet vaginal tablet, Insert 1 tablet into the vagina 2 (Two) Times a Week., Disp: 24 tablet, Rfl: 1      Assessment/Plan   Diagnoses and all orders for this visit:    Essential hypertension    Hyperlipidemia, unspecified hyperlipidemia type  -     Comprehensive Metabolic Panel; Future  -     LP+LDL / HDL Ratio ([facility]); Future  -     TSH Rfx On Abnormal To Free T4; Future  -     CBC & Differential; Future  -     Vitamin D 25 Hydroxy; Future    Acquired hypothyroidism  -     Comprehensive Metabolic Panel; Future  -     LP+LDL / HDL Ratio ([facility]); Future  -     TSH Rfx On Abnormal To Free T4; Future  -     CBC & Differential; Future  -     Vitamin D 25 Hydroxy; Future    Gastroesophageal reflux disease, esophagitis presence not specified  -     Comprehensive Metabolic Panel; Future  -     LP+LDL / HDL Ratio ([facility]); Future  -     TSH Rfx On Abnormal To Free T4; Future  -     CBC & Differential; Future  -     Vitamin D 25 Hydroxy; Future    Vitamin D  deficiency  -     Vitamin D 25 Hydroxy; Future      1. HTN- ok with current meds  2. HPL_ ok with crestor off fibrate  3.  HYpothyroidism-  Ok with current meds  4.  GERD-  She is doing well off the omeprazole  5.  Depression-  She has been stable with the vybriid

## 2020-07-28 DIAGNOSIS — E78.5 HYPERLIPIDEMIA, UNSPECIFIED HYPERLIPIDEMIA TYPE: ICD-10-CM

## 2020-07-28 RX ORDER — ROSUVASTATIN CALCIUM 40 MG/1
TABLET, COATED ORAL
Qty: 90 TABLET | Refills: 1 | Status: SHIPPED | OUTPATIENT
Start: 2020-07-28 | End: 2021-01-25

## 2020-07-28 RX ORDER — LISINOPRIL 20 MG/1
TABLET ORAL
Qty: 90 TABLET | Refills: 1 | Status: SHIPPED | OUTPATIENT
Start: 2020-07-28 | End: 2021-01-25

## 2020-09-21 RX ORDER — LEVOTHYROXINE SODIUM 25 MCG
TABLET ORAL
Qty: 90 TABLET | Refills: 1 | Status: SHIPPED | OUTPATIENT
Start: 2020-09-21 | End: 2021-03-01

## 2020-10-29 ENCOUNTER — TELEMEDICINE (OUTPATIENT)
Dept: INTERNAL MEDICINE | Facility: CLINIC | Age: 70
End: 2020-10-29

## 2020-10-29 VITALS
HEART RATE: 95 BPM | TEMPERATURE: 97.7 F | SYSTOLIC BLOOD PRESSURE: 102 MMHG | OXYGEN SATURATION: 95 % | DIASTOLIC BLOOD PRESSURE: 82 MMHG

## 2020-10-29 DIAGNOSIS — J06.9 ACUTE URI: Primary | ICD-10-CM

## 2020-10-29 PROCEDURE — 99213 OFFICE O/P EST LOW 20 MIN: CPT | Performed by: INTERNAL MEDICINE

## 2020-10-29 RX ORDER — MV-MIN/VIT C/GLUT/LYSINE/HB124 250-12.5MG
TABLET,CHEWABLE ORAL
COMMUNITY

## 2020-10-29 RX ORDER — ALBUTEROL SULFATE 90 UG/1
2 AEROSOL, METERED RESPIRATORY (INHALATION) EVERY 4 HOURS PRN
Qty: 18 G | Refills: 1 | Status: SHIPPED | OUTPATIENT
Start: 2020-10-29 | End: 2021-06-15

## 2020-10-29 NOTE — PROGRESS NOTES
Subjective   Dian Jefferson is a 70 y.o. female here today to for cough, tired and headache and sore throat.  Pt was positive for Covid.    History of Present Illness    with fever and cough last week and went and got tested at [facility]  He was positive and she was tested and was   She is feeling HA and sore throat  And fatigue     The following portions of the patient's history were reviewed and updated as appropriate: allergies, current medications, past medical history, past social history and problem list.    Review of Systems   All other systems reviewed and are negative.      Objective   Physical Exam  Constitutional:       Appearance: Normal appearance.   Neurological:      General: No focal deficit present.      Mental Status: She is alert and oriented to person, place, and time.   Psychiatric:         Mood and Affect: Mood normal.         Behavior: Behavior normal.         Thought Content: Thought content normal.         Vitals:    10/29/20 1117   BP: 102/82   Pulse: 95   Temp: 97.7 °F (36.5 °C)   SpO2: 95%     There is no height or weight on file to calculate BMI.       Current Outpatient Medications:   •  cetirizine (ZyrTEC) 10 MG tablet, Take 1 tablet by mouth daily., Disp: , Rfl:   •  Cholecalciferol (VITAMIN D3) 2000 UNITS capsule, Take 1 capsule by mouth daily., Disp: , Rfl:   •  lisinopril (PRINIVIL,ZESTRIL) 20 MG tablet, TAKE 1 TABLET BY MOUTH EVERY DAY, Disp: 90 tablet, Rfl: 1  •  Multiple Vitamins-Minerals (Airborne Gummies) chewable tablet, Chew., Disp: , Rfl:   •  rosuvastatin (CRESTOR) 40 MG tablet, TAKE 1 TABLET BY MOUTH EVERY DAY EVERY NIGHT, Disp: 90 tablet, Rfl: 1  •  Synthroid 25 MCG tablet, TAKE 1 TABLET BY MOUTH EVERY DAY, Disp: 90 tablet, Rfl: 1  •  vilazodone (Viibryd) 40 MG tablet tablet, Take 1 tablet by mouth Daily., Disp: 90 tablet, Rfl: 1  •  YUVAFEM 10 MCG tablet vaginal tablet, Insert 1 tablet into the vagina 2 (Two) Times a Week., Disp: 24 tablet, Rfl: 1  •  albuterol  sulfate  (90 Base) MCG/ACT inhaler, Inhale 2 puffs Every 4 (Four) Hours As Needed for Wheezing., Disp: 18 g, Rfl: 1      Assessment/Plan   Diagnoses and all orders for this visit:    1. Acute URI (Primary)    Other orders  -     albuterol sulfate  (90 Base) MCG/ACT inhaler; Inhale 2 puffs Every 4 (Four) Hours As Needed for Wheezing.  Dispense: 18 g; Refill: 1        1.  URI-   recently diagnosed with Covid.  She had a Covid test on Monday that was negative but she had no symptoms at that time.  She will use an albuterol inhaler as needed because occasionally she gets short of breath after she has been coughing well.  She will continue her vitamins and staying very well-hydrated.  If this is negative and symptoms continue she may need further testing

## 2020-11-02 LAB — SARS-COV-2 RNA RESP QL NAA+PROBE: DETECTED

## 2020-11-11 ENCOUNTER — RESULTS ENCOUNTER (OUTPATIENT)
Dept: INTERNAL MEDICINE | Facility: CLINIC | Age: 70
End: 2020-11-11

## 2020-11-11 DIAGNOSIS — E03.9 ACQUIRED HYPOTHYROIDISM: ICD-10-CM

## 2020-11-11 DIAGNOSIS — K21.9 GASTROESOPHAGEAL REFLUX DISEASE: ICD-10-CM

## 2020-11-11 DIAGNOSIS — E78.5 HYPERLIPIDEMIA, UNSPECIFIED HYPERLIPIDEMIA TYPE: ICD-10-CM

## 2020-11-11 DIAGNOSIS — E55.9 VITAMIN D DEFICIENCY: ICD-10-CM

## 2020-12-10 LAB
25(OH)D3+25(OH)D2 SERPL-MCNC: 42.1 NG/ML (ref 30–100)
ALBUMIN SERPL-MCNC: 4.8 G/DL (ref 3.5–5.2)
ALBUMIN/GLOB SERPL: 2.1 G/DL
ALP SERPL-CCNC: 61 U/L (ref 39–117)
ALT SERPL-CCNC: 30 U/L (ref 1–33)
AST SERPL-CCNC: 26 U/L (ref 1–32)
BASOPHILS # BLD AUTO: 0.05 10*3/MM3 (ref 0–0.2)
BASOPHILS NFR BLD AUTO: 0.9 % (ref 0–1.5)
BILIRUB SERPL-MCNC: 0.4 MG/DL (ref 0–1.2)
BUN SERPL-MCNC: 21 MG/DL (ref 8–23)
BUN/CREAT SERPL: 22.3 (ref 7–25)
CALCIUM SERPL-MCNC: 9.5 MG/DL (ref 8.6–10.5)
CHLORIDE SERPL-SCNC: 104 MMOL/L (ref 98–107)
CHOLEST SERPL-MCNC: 217 MG/DL (ref 0–200)
CO2 SERPL-SCNC: 24.8 MMOL/L (ref 22–29)
CREAT SERPL-MCNC: 0.94 MG/DL (ref 0.57–1)
EOSINOPHIL # BLD AUTO: 0.18 10*3/MM3 (ref 0–0.4)
EOSINOPHIL NFR BLD AUTO: 3.4 % (ref 0.3–6.2)
ERYTHROCYTE [DISTWIDTH] IN BLOOD BY AUTOMATED COUNT: 13.2 % (ref 12.3–15.4)
GLOBULIN SER CALC-MCNC: 2.3 GM/DL
GLUCOSE SERPL-MCNC: 107 MG/DL (ref 65–99)
HCT VFR BLD AUTO: 38.7 % (ref 34–46.6)
HDLC SERPL-MCNC: 56 MG/DL (ref 40–60)
HGB BLD-MCNC: 12.9 G/DL (ref 12–15.9)
IMM GRANULOCYTES # BLD AUTO: 0.02 10*3/MM3 (ref 0–0.05)
IMM GRANULOCYTES NFR BLD AUTO: 0.4 % (ref 0–0.5)
LDLC SERPL CALC-MCNC: 134 MG/DL (ref 0–100)
LDLC/HDLC SERPL: 2.34 {RATIO}
LYMPHOCYTES # BLD AUTO: 1.79 10*3/MM3 (ref 0.7–3.1)
LYMPHOCYTES NFR BLD AUTO: 33.8 % (ref 19.6–45.3)
MCH RBC QN AUTO: 30.1 PG (ref 26.6–33)
MCHC RBC AUTO-ENTMCNC: 33.3 G/DL (ref 31.5–35.7)
MCV RBC AUTO: 90.2 FL (ref 79–97)
MONOCYTES # BLD AUTO: 0.4 10*3/MM3 (ref 0.1–0.9)
MONOCYTES NFR BLD AUTO: 7.5 % (ref 5–12)
NEUTROPHILS # BLD AUTO: 2.86 10*3/MM3 (ref 1.7–7)
NEUTROPHILS NFR BLD AUTO: 54 % (ref 42.7–76)
NRBC BLD AUTO-RTO: 0 /100 WBC (ref 0–0.2)
PLATELET # BLD AUTO: 300 10*3/MM3 (ref 140–450)
POTASSIUM SERPL-SCNC: 4.1 MMOL/L (ref 3.5–5.2)
PROT SERPL-MCNC: 7.1 G/DL (ref 6–8.5)
RBC # BLD AUTO: 4.29 10*6/MM3 (ref 3.77–5.28)
SODIUM SERPL-SCNC: 139 MMOL/L (ref 136–145)
TRIGL SERPL-MCNC: 150 MG/DL (ref 0–150)
TSH SERPL DL<=0.005 MIU/L-ACNC: 1.92 UIU/ML (ref 0.27–4.2)
VLDLC SERPL CALC-MCNC: 27 MG/DL (ref 5–40)
WBC # BLD AUTO: 5.3 10*3/MM3 (ref 3.4–10.8)

## 2020-12-15 ENCOUNTER — OFFICE VISIT (OUTPATIENT)
Dept: INTERNAL MEDICINE | Facility: CLINIC | Age: 70
End: 2020-12-15

## 2020-12-15 VITALS
HEART RATE: 82 BPM | BODY MASS INDEX: 31.94 KG/M2 | SYSTOLIC BLOOD PRESSURE: 124 MMHG | DIASTOLIC BLOOD PRESSURE: 68 MMHG | HEIGHT: 65 IN | OXYGEN SATURATION: 98 % | WEIGHT: 191.7 LBS

## 2020-12-15 DIAGNOSIS — E55.9 VITAMIN D DEFICIENCY: ICD-10-CM

## 2020-12-15 DIAGNOSIS — E78.5 HYPERLIPIDEMIA, UNSPECIFIED HYPERLIPIDEMIA TYPE: ICD-10-CM

## 2020-12-15 DIAGNOSIS — Z00.00 MEDICARE ANNUAL WELLNESS VISIT, SUBSEQUENT: Primary | ICD-10-CM

## 2020-12-15 DIAGNOSIS — M25.551 BILATERAL HIP PAIN: ICD-10-CM

## 2020-12-15 DIAGNOSIS — Z12.11 COLON CANCER SCREENING: ICD-10-CM

## 2020-12-15 DIAGNOSIS — E03.9 ACQUIRED HYPOTHYROIDISM: ICD-10-CM

## 2020-12-15 DIAGNOSIS — M25.552 BILATERAL HIP PAIN: ICD-10-CM

## 2020-12-15 DIAGNOSIS — I10 ESSENTIAL HYPERTENSION: ICD-10-CM

## 2020-12-15 PROCEDURE — 99214 OFFICE O/P EST MOD 30 MIN: CPT | Performed by: INTERNAL MEDICINE

## 2020-12-15 PROCEDURE — G0439 PPPS, SUBSEQ VISIT: HCPCS | Performed by: INTERNAL MEDICINE

## 2020-12-15 NOTE — PROGRESS NOTES
Subjective   Dian Jefferson is a 70 y.o. female here today to f/u on hyperlipidemia, HTN, hypothyroidism and anxiety.  Pt c/o lower back pain and bilateral hip pain.      History of Present Illness   Pt has been taking BP meds as prescribed without any problems.  No HA  No episodes of orthostasis  Pt has been taking cholesterol meds as prescribed.  No difficulties with myalgias.   Pt has been doing well with thyroid meds.  Taking as perscribed without any complications    The following portions of the patient's history were reviewed and updated as appropriate: allergies, current medications, past medical history, past social history and problem list.    Review of Systems    Objective   Physical Exam    Vitals:    12/15/20 1354   BP: 124/68   Pulse: 82   SpO2: 98%     Body mass index is 31.9 kg/m².       Results Encounter on 11/11/2020   Component Date Value Ref Range Status   • Glucose 12/09/2020 107* 65 - 99 mg/dL Final   • BUN 12/09/2020 21  8 - 23 mg/dL Final   • Creatinine 12/09/2020 0.94  0.57 - 1.00 mg/dL Final   • eGFR Non African Am 12/09/2020 59* >60 mL/min/1.73 Final   • eGFR African Am 12/09/2020 72  >60 mL/min/1.73 Final   • BUN/Creatinine Ratio 12/09/2020 22.3  7.0 - 25.0 Final   • Sodium 12/09/2020 139  136 - 145 mmol/L Final   • Potassium 12/09/2020 4.1  3.5 - 5.2 mmol/L Final   • Chloride 12/09/2020 104  98 - 107 mmol/L Final   • Total CO2 12/09/2020 24.8  22.0 - 29.0 mmol/L Final   • Calcium 12/09/2020 9.5  8.6 - 10.5 mg/dL Final   • Total Protein 12/09/2020 7.1  6.0 - 8.5 g/dL Final   • Albumin 12/09/2020 4.80  3.50 - 5.20 g/dL Final   • Globulin 12/09/2020 2.3  gm/dL Final   • A/G Ratio 12/09/2020 2.1  g/dL Final   • Total Bilirubin 12/09/2020 0.4  0.0 - 1.2 mg/dL Final   • Alkaline Phosphatase 12/09/2020 61  39 - 117 U/L Final   • AST (SGOT) 12/09/2020 26  1 - 32 U/L Final   • ALT (SGPT) 12/09/2020 30  1 - 33 U/L Final   • Total Cholesterol 12/09/2020 217* 0 - 200 mg/dL Final   • Triglycerides  12/09/2020 150  0 - 150 mg/dL Final   • HDL Cholesterol 12/09/2020 56  40 - 60 mg/dL Final   • VLDL Cholesterol [place] 12/09/2020 27  5 - 40 mg/dL Final   • LDL Chol Calc ([facility]) 12/09/2020 134* 0 - 100 mg/dL Final   • LDL/HDL RATIO 12/09/2020 2.34   Final   • TSH 12/09/2020 1.920  0.270 - 4.200 uIU/mL Final   • WBC 12/09/2020 5.30  3.40 - 10.80 10*3/mm3 Final   • RBC 12/09/2020 4.29  3.77 - 5.28 10*6/mm3 Final   • Hemoglobin 12/09/2020 12.9  12.0 - 15.9 g/dL Final   • Hematocrit 12/09/2020 38.7  34.0 - 46.6 % Final   • MCV 12/09/2020 90.2  79.0 - 97.0 fL Final   • MCH 12/09/2020 30.1  26.6 - 33.0 pg Final   • MCHC 12/09/2020 33.3  31.5 - 35.7 g/dL Final   • RDW 12/09/2020 13.2  12.3 - 15.4 % Final   • Platelets 12/09/2020 300  140 - 450 10*3/mm3 Final   • Neutrophil Rel % 12/09/2020 54.0  42.7 - 76.0 % Final   • Lymphocyte Rel % 12/09/2020 33.8  19.6 - 45.3 % Final   • Monocyte Rel % 12/09/2020 7.5  5.0 - 12.0 % Final   • Eosinophil Rel % 12/09/2020 3.4  0.3 - 6.2 % Final   • Basophil Rel % 12/09/2020 0.9  0.0 - 1.5 % Final   • Neutrophils Absolute 12/09/2020 2.86  1.70 - 7.00 10*3/mm3 Final   • Lymphocytes Absolute 12/09/2020 1.79  0.70 - 3.10 10*3/mm3 Final   • Monocytes Absolute 12/09/2020 0.40  0.10 - 0.90 10*3/mm3 Final   • Eosinophils Absolute 12/09/2020 0.18  0.00 - 0.40 10*3/mm3 Final   • Basophils Absolute 12/09/2020 0.05  0.00 - 0.20 10*3/mm3 Final   • Immature Granulocyte Rel % 12/09/2020 0.4  0.0 - 0.5 % Final   • Immature Grans Absolute 12/09/2020 0.02  0.00 - 0.05 10*3/mm3 Final   • nRBC 12/09/2020 0.0  0.0 - 0.2 /100 WBC Final   • 25 Hydroxy, Vitamin D 12/09/2020 42.1  30.0 - 100.0 ng/ml Final    Comment: Results may be falsely increased if patient taking Biotin.  Reference Range for Total Vitamin D 25(OH)  Deficiency <20.0 ng/mL  Insufficiency 21-29 ng/mL  Sufficiency  ng/mL  Toxicity >100 ng/ml         Current Outpatient Medications:   •  albuterol sulfate  (90 Base) MCG/ACT inhaler, Inhale  2 puffs Every 4 (Four) Hours As Needed for Wheezing., Disp: 18 g, Rfl: 1  •  cetirizine (ZyrTEC) 10 MG tablet, Take 1 tablet by mouth daily., Disp: , Rfl:   •  Cholecalciferol (VITAMIN D3) 2000 UNITS capsule, Take 1 capsule by mouth daily., Disp: , Rfl:   •  lisinopril (PRINIVIL,ZESTRIL) 20 MG tablet, TAKE 1 TABLET BY MOUTH EVERY DAY, Disp: 90 tablet, Rfl: 1  •  Multiple Vitamins-Minerals (Airborne Gummies) chewable tablet, Chew., Disp: , Rfl:   •  rosuvastatin (CRESTOR) 40 MG tablet, TAKE 1 TABLET BY MOUTH EVERY DAY EVERY NIGHT, Disp: 90 tablet, Rfl: 1  •  Synthroid 25 MCG tablet, TAKE 1 TABLET BY MOUTH EVERY DAY, Disp: 90 tablet, Rfl: 1  •  vilazodone (Viibryd) 40 MG tablet tablet, Take 1 tablet by mouth Daily., Disp: 90 tablet, Rfl: 1  •  YUVAFEM 10 MCG tablet vaginal tablet, Insert 1 tablet into the vagina 2 (Two) Times a Week., Disp: 24 tablet, Rfl: 1      Assessment/Plan   Diagnoses and all orders for this visit:    1. Medicare annual wellness visit, subsequent (Primary)    2. Colon cancer screening  -     Ambulatory Referral For Screening Colonoscopy    3. Essential hypertension    4. Hyperlipidemia, unspecified hyperlipidemia type    5. Acquired hypothyroidism    1.  HTN- ok with current meds  2.  HPL- ok with current meds  3. Hypothyroidism ok with current meds  4.  Depression/anxiety-  Ok with meds  5.  bilat hip pain-    ? Bursitis-  Refer to ortho

## 2020-12-15 NOTE — PROGRESS NOTES
The ABCs of the Annual Wellness Visit  Subsequent Medicare Wellness Visit    No chief complaint on file.      Subjective   History of Present Illness:  Dian Jefferson is a 70 y.o. female who presents for a Subsequent Medicare Wellness Visit.    HEALTH RISK ASSESSMENT    Recent Hospitalizations:  No hospitalization(s) within the last year.    Current Medical Providers:  Patient Care Team:  Mallika Stewart MD as PCP - General (Internal Medicine)  Britney Cole MD as Consulting Physician (Obstetrics and Gynecology)    Smoking Status:  Social History     Tobacco Use   Smoking Status Never Smoker   Smokeless Tobacco Never Used       Alcohol Consumption:  Social History     Substance and Sexual Activity   Alcohol Use Yes    Comment: OCC. USE       Depression Screen:   PHQ-2/PHQ-9 Depression Screening 12/15/2020   Little interest or pleasure in doing things 0   Feeling down, depressed, or hopeless 1   Trouble falling or staying asleep, or sleeping too much 1   Feeling tired or having little energy 0   Poor appetite or overeating 0   Feeling bad about yourself - or that you are a failure or have let yourself or your family down 0   Trouble concentrating on things, such as reading the newspaper or watching television 0   Moving or speaking so slowly that other people could have noticed. Or the opposite - being so fidgety or restless that you have been moving around a lot more than usual 0   Thoughts that you would be better off dead, or of hurting yourself in some way 0   Total Score 2   If you checked off any problems, how difficult have these problems made it for you to do your work, take care of things at home, or get along with other people? Not difficult at all       Fall Risk Screen:  STEADI Fall Risk Assessment was completed, and patient is at LOW risk for falls.Assessment completed on:12/15/2020    Health Habits and Functional and Cognitive Screening:  Functional & Cognitive Status 12/15/2020   Do you have difficulty  preparing food and eating? No   Do you have difficulty bathing yourself, getting dressed or grooming yourself? No   Do you have difficulty using the toilet? No   Do you have difficulty moving around from place to place? No   Do you have trouble with steps or getting out of a bed or a chair? No   Current Diet Well Balanced Diet        Current Diet Comment limited snacking   Dental Exam Up to date   Eye Exam Up to date   Exercise (times per week) 7 times per week   Current Exercises Include Walking   Current Exercise Activities Include -   Do you need help using the phone?  No   Are you deaf or do you have serious difficulty hearing?  No   Do you need help with transportation? No   Do you need help shopping? No   Do you need help preparing meals?  No   Do you need help with housework?  No   Do you need help with laundry? No   Do you need help taking your medications? No   Do you need help managing money? No   Do you ever drive or ride in a car without wearing a seat belt? No   Have you felt unusual stress, anger or loneliness in the last month? Yes   Who do you live with? Spouse   If you need help, do you have trouble finding someone available to you? No   Have you been bothered in the last four weeks by sexual problems? No   Do you have difficulty concentrating, remembering or making decisions? No         Does the patient have evidence of cognitive impairment? No    Asprin use counseling:Does not need ASA but is currently taking (advised patient that ASA is not indicated and patient chooses to stop it)    Age-appropriate Screening Schedule:  Refer to the list below for future screening recommendations based on patient's age, sex and/or medical conditions. Orders for these recommended tests are listed in the plan section. The patient has been provided with a written plan.    Health Maintenance   Topic Date Due   • COLONOSCOPY  05/22/2020   • ZOSTER VACCINE (2 of 2) 12/15/2020 (Originally 9/7/2017)   • INFLUENZA  VACCINE  12/15/2021 (Originally 8/1/2020)   • LIPID PANEL  12/09/2021   • MAMMOGRAM  03/10/2022   • TDAP/TD VACCINES (2 - Td) 07/13/2027   • PAP SMEAR  Discontinued          The following portions of the patient's history were reviewed and updated as appropriate: allergies, current medications, past medical history, past social history and problem list.    Outpatient Medications Prior to Visit   Medication Sig Dispense Refill   • albuterol sulfate  (90 Base) MCG/ACT inhaler Inhale 2 puffs Every 4 (Four) Hours As Needed for Wheezing. 18 g 1   • cetirizine (ZyrTEC) 10 MG tablet Take 1 tablet by mouth daily.     • Cholecalciferol (VITAMIN D3) 2000 UNITS capsule Take 1 capsule by mouth daily.     • lisinopril (PRINIVIL,ZESTRIL) 20 MG tablet TAKE 1 TABLET BY MOUTH EVERY DAY 90 tablet 1   • Multiple Vitamins-Minerals (Airborne Gummies) chewable tablet Chew.     • rosuvastatin (CRESTOR) 40 MG tablet TAKE 1 TABLET BY MOUTH EVERY DAY EVERY NIGHT 90 tablet 1   • Synthroid 25 MCG tablet TAKE 1 TABLET BY MOUTH EVERY DAY 90 tablet 1   • vilazodone (Viibryd) 40 MG tablet tablet Take 1 tablet by mouth Daily. 90 tablet 1   • YUVAFEM 10 MCG tablet vaginal tablet Insert 1 tablet into the vagina 2 (Two) Times a Week. 24 tablet 1     No facility-administered medications prior to visit.        Patient Active Problem List   Diagnosis   • Anxiety   • Gastroesophageal reflux disease   • Menopausal flushing   • Essential hypertension   • Hyperlipidemia   • Hypothyroidism   • Abnormal mammogram   • Breast implant rupture   • Vitamin D deficiency   • Tubular adenoma of colon   • Sigmoid diverticulosis   • Internal hemorrhoids   • DDD (degenerative disc disease), lumbar   • Spinal stenosis, lumbar region, with neurogenic claudication       Advanced Care Planning:  ACP discussion was held with the patient during this visit. Patient has an advance directive (not in EMR), copy requested.    Review of Systems    Compared to one year ago,  "the patient feels her physical health is worse.  Compared to one year ago, the patient feels her mental health is the same.    Reviewed chart for potential of high risk medication in the elderly: yes  Reviewed chart for potential of harmful drug interactions in the elderly:yes    Objective         Vitals:    12/15/20 1354   BP: 124/68   BP Location: Left arm   Patient Position: Sitting   Pulse: 82   SpO2: 98%   Weight: 87 kg (191 lb 11.2 oz)   Height: 165.1 cm (65\")   PainSc:   4       Body mass index is 31.9 kg/m².  Discussed the patient's BMI with her. The BMI is above average; BMI management plan is completed.    Physical Exam    Lab Results   Component Value Date     (H) 12/09/2020    CHLPL 217 (H) 12/09/2020    TRIG 150 12/09/2020    HDL 56 12/09/2020     (H) 12/09/2020    VLDL 27 12/09/2020        Assessment/Plan   Medicare Risks and Personalized Health Plan  CMS Preventative Services Quick Reference  Chronic Pain   Immunizations Discussed/Encouraged (specific immunizations; Pneumococcal 23 and Shingrix )  She declines vaccines but I rec    The above risks/problems have been discussed with the patient.  Pertinent information has been shared with the patient in the After Visit Summary.  Follow up plans and orders are seen below in the Assessment/Plan Section.    Diagnoses and all orders for this visit:    1. Medicare annual wellness visit, subsequent (Primary)    2. Colon cancer screening  -     Ambulatory Referral For Screening Colonoscopy    3. Essential hypertension    4. Hyperlipidemia, unspecified hyperlipidemia type    5. Acquired hypothyroidism      Follow Up:  No follow-ups on file.     An After Visit Summary and PPPS were given to the patient.       I have rec some reg exercises        "

## 2020-12-15 NOTE — PATIENT INSTRUCTIONS
Medicare Wellness  Personal Prevention Plan of Service     Date of Office Visit:  12/15/2020  Encounter Provider:  Mallika Stewart MD  Place of Service:  [facility] INTER[facility] MED[facility]  Patient Name: Dian Jefferson  :  1950    As part of the Medicare Wellness portion of your visit today, we are providing you with this personalized preventive plan of services (PPPS). This plan is based upon recommendations of the [place] Preventive Services Task Force (USPSTF) and the Advisory Committee on Immunization Practices (ACIP).    This lists the preventive care services that should be considered, and provides dates of when you are due. Items listed as completed are up-to-date and do not require any further intervention.    Health Maintenance   Topic Date Due   • COLONOSCOPY  2020   • ANNUAL WELLNESS VISIT  12/10/2020   • ZOSTER VACCINE (2 of 2) 12/15/2020 (Originally 2017)   • INFLUENZA VACCINE  12/15/2021 (Originally 2020)   • Pneumococcal Vaccine 65+ (1 of 1 - PPSV23) 12/15/2021 (Originally 2017)   • LIPID PANEL  2021   • MAMMOGRAM  03/10/2022   • TDAP/TD VACCINES (2 - Td) 2027   • HEPATITIS C SCREENING  Completed   • PAP SMEAR  Discontinued       Orders Placed This Encounter   Procedures   • Ambulatory Referral For Screening Colonoscopy     Referral Priority:   Routine     Referral Type:   Diagnostic Medical     Referral Reason:   Specialty Services Required     Number of Visits Requested:   1   • Ambulatory Referral to Sports Medicine     Referral Priority:   Routine     Referral Type:   Consultation     Referral Reason:   Specialty Services Required     Referred to Provider:   Antonio Espinoza Jr., DO     Requested Specialty:   Sports Medicine     Number of Visits Requested:   1       No follow-ups on file.          Exercises To Do While Sitting    Exercises that you do while sitting (chair exercises) can give you many of the same benefits as full  "exercise. Benefits include strengthening your heart, burning calories, and keeping muscles and joints healthy. Exercise can also improve your mood and help with depression and anxiety.  You may benefit from chair exercises if you are unable to do standing exercises because of:  · Diabetic foot pain.  · Obesity.  · Illness.  · Arthritis.  · Recovery from surgery or injury.  · Breathing problems.  · Balance problems.  · Another type of disability.  Before starting chair exercises, check with your health care provider or a physical therapist to find out how much exercise you can tolerate and which exercises are safe for you. If your health care provider approves:  · Start out slowly and build up over time. Aim to work up to about 10-20 minutes for each exercise session.  · Make exercise part of your daily routine.  · Drink water when you exercise. Do not wait until you are thirsty. Drink every 10-15 minutes.  · Stop exercising right away if you have pain, nausea, shortness of breath, or dizziness.  · If you are exercising in a wheelchair, make sure to lock the wheels.  · Ask your health care provider whether you can do corina chi or yoga. Many positions in these mind-body exercises can be modified to do while seated.  Warm-up  Before starting other exercises:  1. Sit up as straight as you can. Have your knees bent at 90 degrees, which is the shape of the capital letter \"L.\" Keep your feet flat on the floor.  2. Sit at the front edge of your chair, if you can.  3. Pull in (tighten) the muscles in your abdomen and stretch your spine and neck as straight as you can. Hold this position for a few minutes.  4. Breathe in and out evenly. Try to concentrate on your breathing, and relax your mind.  Stretching  Exercise A: Arm stretch  1. Hold your arms out straight in front of your body.  2. Bend your hands at the wrist with your fingers pointing up, as if signaling someone to stop. Notice the slight tension in your forearms as " "you hold the position.  3. Keeping your arms out and your hands bent, rotate your hands outward as far as you can and hold this stretch. Aim to have your thumbs pointing up and your pinkie fingers pointing down.  Slowly repeat arm stretches for one minute as tolerated.  Exercise B: Leg stretch  1. If you can move your legs, try to \"draw\" letters on the floor with the toes of your foot. Write your name with one foot.  2. Write your name with the toes of your other foot.  Slowly repeat the movements for one minute as tolerated.  Exercise C: Reach for the edilberto  1. Reach your hands as far over your head as you can to stretch your spine.  2. Move your hands and arms as if you are climbing a rope.  Slowly repeat the movements for one minute as tolerated.  Range of motion exercises  Exercise A: Shoulder roll  1. Let your arms hang loosely at your sides.  2. Lift just your shoulders up toward your ears, then let them relax back down.  3. When your shoulders feel loose, rotate your shoulders in backward and forward circles.  Do shoulder rolls slowly for one minute as tolerated.  Exercise B: March in place  1. As if you are marching, pump your arms and lift your legs up and down. Lift your knees as high as you can.  ? If you are unable to lift your knees, just pump your arms and move your ankles and feet up and down.  March in place for one minute as tolerated.  Exercise C: Seated jumping jacks  1. Let your arms hang down straight.  2. Keeping your arms straight, lift them up over your head. Aim to point your fingers to the ceiling.  3. While you lift your arms, straighten your legs and slide your heels along the floor to your sides, as wide as you can.  4. As you bring your arms back down to your sides, slide your legs back together.  ? If you are unable to use your legs, just move your arms.  Slowly repeat seated jumping jacks for one minute as tolerated.  Strengthening exercises  Exercise A: Shoulder squeeze  1. Hold your " arms straight out from your body to your sides, with your elbows bent and your fists pointed at the ceiling.  2. Keeping your arms in the bent position, move them forward so your elbows and forearms meet in front of your face.  3. Open your arms back out as wide as you can with your elbows still bent, until you feel your shoulder blades squeezing together. Hold for 5 seconds.  Slowly repeat the movements forward and backward for one minute as tolerated.  Contact a health care provider if you:  · Had to stop exercising due to any of the following:  ? Pain.  ? Nausea.  ? Shortness of breath.  ? Dizziness.  ? Fatigue.  · Have significant pain or soreness after exercising.  Get help right away if you have:  · Chest pain.  · Difficulty breathing.  These symptoms may represent a serious problem that is an emergency. Do not wait to see if the symptoms will go away. Get medical help right away. Call your local emergency services (911 in the [place] Do not drive yourself to the hospital.  This information is not intended to replace advice given to you by your health care provider. Make sure you discuss any questions you have with your health care provider.  Document Revised: 04/09/2020 Document Reviewed: 10/31/2018  Elsevier Patient Education © 2020 [employer]    Sit-to-Stand Exercise    The sit-to-stand exercise (also known as the chair stand or chair rise exercise) strengthens your lower body and helps you maintain or improve your mobility and independence. The goal is to do the sit-to-stand exercise without using your hands. This will be easier as you become stronger. You should always talk with your health care provider before starting any exercise program, especially if you have had recent surgery.  Do the exercise exactly as told by your health care provider and adjust it as directed. It is normal to feel mild stretching, pulling, tightness, or discomfort as you do this exercise, but you should stop right away if you  feel sudden pain or your pain gets worse. Do not begin doing this exercise until told by your health care provider.  What the sit-to-stand exercise does  The sit-to-stand exercise helps to strengthen the muscles in your thighs and the muscles in the center of your body that give you stability (core muscles). This exercise is especially helpful if:  · You have had knee or hip surgery.  · You have trouble getting up from a chair, out of a car, or off the toilet.  How to do the sit-to-stand exercise  1. Sit toward the front edge of a sturdy chair without armrests. Your knees should be bent and your feet should be flat on the floor and shoulder-width apart.  2. Place your hands lightly on each side of the seat. Keep your back and neck as straight as possible, with your chest slightly forward.  3. Breathe in slowly. Lean forward and slightly shift your weight to the front of your feet.  4. Breathe out as you slowly stand up. Use your hands as little as possible.  5. Stand and pause for a full breath in and out.  6. Breathe in as you sit down slowly. Tighten your core and abdominal muscles to control your lowering as much as possible.  7. Breathe out slowly.  8. Do this exercise 10-15 times. If needed, do it fewer times until you build up strength.  9. Rest for 1 minute, then do another set of 10-15 repetitions.  To change the difficulty of the sit-to-stand exercise  · If the exercise is too difficult, use a chair with sturdy armrests, and push off the armrests to help you come to the standing position. You can also use the armrests to help slowly lower yourself back to sitting. As this gets easier, try to use your arms less. You can also place a firm cushion or pillow on the chair to make the surface higher.  · If this exercise is too easy, do not use your arms to help raise or lower yourself. You can also wear a weighted vest, use hand weights, increase your repetitions, or try a lower chair.  General tips  · You may  feel tired when starting an exercise routine. This is normal.  · You may have muscle soreness that lasts a few days. This is normal. As you get stronger, you may not feel muscle soreness.  · Use smooth, steady movements.  · Do not  hold your breath during strength exercises. This can cause unsafe changes in your blood pressure.  · Breathe in slowly through your nose, and breathe out slowly through your mouth.  Summary  · Strengthening your lower body is an important step to help you move safely and independently.  · The sit-to-stand exercise helps strengthen the muscles in your thighs and core.  · You should always talk with your health care provider before starting any exercise program, especially if you have had recent surgery.  This information is not intended to replace advice given to you by your health care provider. Make sure you discuss any questions you have with your health care provider.  Document Revised: 10/16/2019 Document Reviewed: 02/08/2018  Elsevier Patient Education © 2020 [employer]

## 2021-01-06 ENCOUNTER — TELEPHONE (OUTPATIENT)
Dept: INTERNAL MEDICINE | Facility: CLINIC | Age: 71
End: 2021-01-06

## 2021-01-06 NOTE — TELEPHONE ENCOUNTER
Caller: Li Dian PIERRE    Relationship: Self    Best call back number: [phone]    What orders are you requesting (i.e. lab or imaging): ANTIBODY LABS FOR COVID 19    In what timeframe would the patient need to come in: IN THE NEXT DAY OR TWO     Where will you receive your lab/imaging services: IN OFFICE     Additional notes: PATIENT HAD LABS DONE IN December AND ASKED TO HAVE ANTIBODY FOR COVID ALSO IN LAB WORK ORDERS BUT IT WAS NOT INCLUDED. PATIENT IS UPSET ABOUT IT AND WOULD LIKE DR DENG TO CALL IN ORDERS FOR HER TO HAVE THAT DONE IN THE NEXT COUPLE OF DAYS. PLEASE CONTACT PATIENT TO LET HER KNOW WHEN ORDER IS AVAILABLE.

## 2021-01-07 DIAGNOSIS — U07.1 COVID-19: Primary | ICD-10-CM

## 2021-01-16 LAB — SARS-COV-2 AB SERPL QL IA: POSITIVE

## 2021-01-23 DIAGNOSIS — E78.5 HYPERLIPIDEMIA, UNSPECIFIED HYPERLIPIDEMIA TYPE: ICD-10-CM

## 2021-01-25 RX ORDER — ROSUVASTATIN CALCIUM 40 MG/1
TABLET, COATED ORAL
Qty: 90 TABLET | Refills: 1 | Status: SHIPPED | OUTPATIENT
Start: 2021-01-25 | End: 2021-06-15 | Stop reason: SDUPTHER

## 2021-01-25 RX ORDER — LISINOPRIL 20 MG/1
TABLET ORAL
Qty: 90 TABLET | Refills: 1 | Status: SHIPPED | OUTPATIENT
Start: 2021-01-25 | End: 2021-06-15 | Stop reason: SDUPTHER

## 2021-03-01 RX ORDER — VILAZODONE HYDROCHLORIDE 40 MG/1
TABLET ORAL
Qty: 90 TABLET | Refills: 1 | Status: SHIPPED | OUTPATIENT
Start: 2021-03-01 | End: 2021-12-21 | Stop reason: SDUPTHER

## 2021-03-01 RX ORDER — LEVOTHYROXINE SODIUM 25 MCG
TABLET ORAL
Qty: 90 TABLET | Refills: 1 | Status: SHIPPED | OUTPATIENT
Start: 2021-03-01 | End: 2021-08-03

## 2021-03-18 ENCOUNTER — TELEPHONE (OUTPATIENT)
Dept: INTERNAL MEDICINE | Facility: CLINIC | Age: 71
End: 2021-03-18

## 2021-03-18 NOTE — TELEPHONE ENCOUNTER
Caller: Dian Jefferson    Relationship to patient: Self    Best call back number: [phone]    Patient is needing:      AND PATIENT BOTH HAD COVID LATE October, EARLY NOVEMBER AND WERE TESTED FOR ANTIBODIES- LAST TIME THEY BOTH STILL HAD ANTIBODY'S IN THEIR SYSTEM.     THEIR QUESTIONS IS SHOULD THEY GO AHEAD AND PLAN TO GET THEIR COVID VACCINES- AND IF SO WHICH ONES DOES DR DENG RECOMMEND.     PATIENT IS ASKING WHERE THEY CAN GO TO GET THE VACCINES.  I INFORMED HER ABOUT THE [employer] WEBSITE FOR THIS.    PATIENT STATED SHE DOESN'T PREFER TO GET THE VACCINE BUT DOESN'T KNOW IF SHE AND HER  NEED TO GET IT.     PLEASE CALL AND ADVISE.

## 2021-03-18 NOTE — TELEPHONE ENCOUNTER
PT ADVISED THAT DR DENG DOES RECOMMEND THE VACCINE AND THAT SHE SHOULD DEFINITELY WAIT 90 DAYS TO GET THE VACCINE.

## 2021-03-19 ENCOUNTER — BULK ORDERING (OUTPATIENT)
Dept: CASE MANAGEMENT | Facility: OTHER | Age: 71
End: 2021-03-19

## 2021-03-19 DIAGNOSIS — Z23 IMMUNIZATION DUE: ICD-10-CM

## 2021-04-08 ENCOUNTER — APPOINTMENT (OUTPATIENT)
Dept: WOMENS IMAGING | Facility: HOSPITAL | Age: 71
End: 2021-04-08

## 2021-04-08 PROCEDURE — 77067 SCR MAMMO BI INCL CAD: CPT | Performed by: RADIOLOGY

## 2021-04-08 PROCEDURE — 77063 BREAST TOMOSYNTHESIS BI: CPT | Performed by: RADIOLOGY

## 2021-06-07 DIAGNOSIS — E55.9 VITAMIN D DEFICIENCY: ICD-10-CM

## 2021-06-07 DIAGNOSIS — E03.9 ACQUIRED HYPOTHYROIDISM: ICD-10-CM

## 2021-06-07 DIAGNOSIS — E78.5 HYPERLIPIDEMIA, UNSPECIFIED HYPERLIPIDEMIA TYPE: ICD-10-CM

## 2021-06-07 DIAGNOSIS — I10 ESSENTIAL HYPERTENSION: ICD-10-CM

## 2021-06-11 LAB
25(OH)D3+25(OH)D2 SERPL-MCNC: 43 NG/ML (ref 30–100)
ALBUMIN SERPL-MCNC: 4.8 G/DL (ref 3.5–5.2)
ALBUMIN/GLOB SERPL: 2.8 G/DL
ALP SERPL-CCNC: 62 U/L (ref 39–117)
ALT SERPL-CCNC: 39 U/L (ref 1–33)
AST SERPL-CCNC: 27 U/L (ref 1–32)
BASOPHILS # BLD AUTO: 0.04 10*3/MM3 (ref 0–0.2)
BASOPHILS NFR BLD AUTO: 0.8 % (ref 0–1.5)
BILIRUB SERPL-MCNC: 0.5 MG/DL (ref 0–1.2)
BUN SERPL-MCNC: 15 MG/DL (ref 8–23)
BUN/CREAT SERPL: 19.5 (ref 7–25)
CALCIUM SERPL-MCNC: 9.3 MG/DL (ref 8.6–10.5)
CHLORIDE SERPL-SCNC: 104 MMOL/L (ref 98–107)
CHOLEST SERPL-MCNC: 217 MG/DL (ref 0–200)
CO2 SERPL-SCNC: 23.8 MMOL/L (ref 22–29)
CREAT SERPL-MCNC: 0.77 MG/DL (ref 0.57–1)
EOSINOPHIL # BLD AUTO: 0.14 10*3/MM3 (ref 0–0.4)
EOSINOPHIL NFR BLD AUTO: 2.9 % (ref 0.3–6.2)
ERYTHROCYTE [DISTWIDTH] IN BLOOD BY AUTOMATED COUNT: 13.2 % (ref 12.3–15.4)
GLOBULIN SER CALC-MCNC: 1.7 GM/DL
GLUCOSE SERPL-MCNC: 95 MG/DL (ref 65–99)
HCT VFR BLD AUTO: 40.7 % (ref 34–46.6)
HDLC SERPL-MCNC: 49 MG/DL (ref 40–60)
HGB BLD-MCNC: 13.2 G/DL (ref 12–15.9)
IMM GRANULOCYTES # BLD AUTO: 0.01 10*3/MM3 (ref 0–0.05)
IMM GRANULOCYTES NFR BLD AUTO: 0.2 % (ref 0–0.5)
LDLC SERPL CALC-MCNC: 118 MG/DL (ref 0–100)
LDLC/HDLC SERPL: 2.24 {RATIO}
LYMPHOCYTES # BLD AUTO: 1.44 10*3/MM3 (ref 0.7–3.1)
LYMPHOCYTES NFR BLD AUTO: 29.4 % (ref 19.6–45.3)
MCH RBC QN AUTO: 30.1 PG (ref 26.6–33)
MCHC RBC AUTO-ENTMCNC: 32.4 G/DL (ref 31.5–35.7)
MCV RBC AUTO: 92.9 FL (ref 79–97)
MONOCYTES # BLD AUTO: 0.36 10*3/MM3 (ref 0.1–0.9)
MONOCYTES NFR BLD AUTO: 7.4 % (ref 5–12)
NEUTROPHILS # BLD AUTO: 2.9 10*3/MM3 (ref 1.7–7)
NEUTROPHILS NFR BLD AUTO: 59.3 % (ref 42.7–76)
NRBC BLD AUTO-RTO: 0 /100 WBC (ref 0–0.2)
PLATELET # BLD AUTO: 282 10*3/MM3 (ref 140–450)
POTASSIUM SERPL-SCNC: 4.3 MMOL/L (ref 3.5–5.2)
PROT SERPL-MCNC: 6.5 G/DL (ref 6–8.5)
RBC # BLD AUTO: 4.38 10*6/MM3 (ref 3.77–5.28)
SODIUM SERPL-SCNC: 141 MMOL/L (ref 136–145)
TRIGL SERPL-MCNC: 290 MG/DL (ref 0–150)
TSH SERPL DL<=0.005 MIU/L-ACNC: 3.46 UIU/ML (ref 0.27–4.2)
VLDLC SERPL CALC-MCNC: 50 MG/DL (ref 5–40)
WBC # BLD AUTO: 4.89 10*3/MM3 (ref 3.4–10.8)

## 2021-06-15 ENCOUNTER — OFFICE VISIT (OUTPATIENT)
Dept: INTERNAL MEDICINE | Facility: CLINIC | Age: 71
End: 2021-06-15

## 2021-06-15 VITALS
HEART RATE: 73 BPM | HEIGHT: 65 IN | DIASTOLIC BLOOD PRESSURE: 74 MMHG | TEMPERATURE: 97.8 F | BODY MASS INDEX: 32.67 KG/M2 | SYSTOLIC BLOOD PRESSURE: 124 MMHG | WEIGHT: 196.1 LBS | OXYGEN SATURATION: 97 %

## 2021-06-15 DIAGNOSIS — I10 ESSENTIAL HYPERTENSION: ICD-10-CM

## 2021-06-15 DIAGNOSIS — E78.5 HYPERLIPIDEMIA, UNSPECIFIED HYPERLIPIDEMIA TYPE: ICD-10-CM

## 2021-06-15 DIAGNOSIS — E03.9 ACQUIRED HYPOTHYROIDISM: Primary | ICD-10-CM

## 2021-06-15 DIAGNOSIS — F41.9 ANXIETY: ICD-10-CM

## 2021-06-15 PROCEDURE — 99214 OFFICE O/P EST MOD 30 MIN: CPT | Performed by: INTERNAL MEDICINE

## 2021-06-15 RX ORDER — LISINOPRIL 20 MG/1
20 TABLET ORAL DAILY
Qty: 90 TABLET | Refills: 3 | Status: SHIPPED | OUTPATIENT
Start: 2021-06-15 | End: 2022-02-01 | Stop reason: SDUPTHER

## 2021-06-15 RX ORDER — ROSUVASTATIN CALCIUM 40 MG/1
40 TABLET, COATED ORAL NIGHTLY
Qty: 90 TABLET | Refills: 3 | Status: SHIPPED | OUTPATIENT
Start: 2021-06-15 | End: 2022-02-01 | Stop reason: SDUPTHER

## 2021-06-15 NOTE — PROGRESS NOTES
Subjective   Dian Jefferson is a 71 y.o. female here today to f/u on HTN, hyperlipidemia, hypothyroidism and anxiety.      History of Present Illness   Pt has been taking BP meds as prescribed without any problems.  No HA  No episodes of orthostasis  Pt has been taking cholesterol meds as prescribed.  No difficulties with myalgias.   Pt has been doing well with thyroid meds.  Taking as perscribed without any complications  She is cont to have trouble with her back  She is planning on seeing PT    The following portions of the patient's history were reviewed and updated as appropriate: allergies, current medications, past medical history, past social history and problem list.    Review of Systems    Objective   Physical Exam  Vitals reviewed.   Constitutional:       Appearance: She is well-developed.   HENT:      Head: Normocephalic and atraumatic.      Right Ear: External ear normal.      Left Ear: External ear normal.   Eyes:      Conjunctiva/sclera: Conjunctivae normal.      Pupils: Pupils are equal, round, and reactive to light.   Neck:      Thyroid: No thyromegaly.      Trachea: No tracheal deviation.   Cardiovascular:      Rate and Rhythm: Normal rate and regular rhythm.      Heart sounds: Normal heart sounds.   Pulmonary:      Effort: Pulmonary effort is normal.      Breath sounds: Normal breath sounds.   Abdominal:      General: Bowel sounds are normal. There is no distension.      Palpations: Abdomen is soft.      Tenderness: There is no abdominal tenderness.   Musculoskeletal:         General: No deformity. Normal range of motion.      Cervical back: Normal range of motion.   Skin:     General: Skin is warm and dry.   Neurological:      Mental Status: She is alert and oriented to person, place, and time.   Psychiatric:         Behavior: Behavior normal.         Thought Content: Thought content normal.         Judgment: Judgment normal.         Vitals:    06/15/21 1437   BP: 124/74   Pulse: 73   Temp: 97.8 °F  (36.6 °C)   SpO2: 97%     Body mass index is 32.63 kg/m².       Orders Only on 06/07/2021   Component Date Value Ref Range Status   • WBC 06/10/2021 4.89  3.40 - 10.80 10*3/mm3 Final   • RBC 06/10/2021 4.38  3.77 - 5.28 10*6/mm3 Final   • Hemoglobin 06/10/2021 13.2  12.0 - 15.9 g/dL Final   • Hematocrit 06/10/2021 40.7  34.0 - 46.6 % Final   • MCV 06/10/2021 92.9  79.0 - 97.0 fL Final   • MCH 06/10/2021 30.1  26.6 - 33.0 pg Final   • MCHC 06/10/2021 32.4  31.5 - 35.7 g/dL Final   • RDW 06/10/2021 13.2  12.3 - 15.4 % Final   • Platelets 06/10/2021 282  140 - 450 10*3/mm3 Final   • Neutrophil Rel % 06/10/2021 59.3  42.7 - 76.0 % Final   • Lymphocyte Rel % 06/10/2021 29.4  19.6 - 45.3 % Final   • Monocyte Rel % 06/10/2021 7.4  5.0 - 12.0 % Final   • Eosinophil Rel % 06/10/2021 2.9  0.3 - 6.2 % Final   • Basophil Rel % 06/10/2021 0.8  0.0 - 1.5 % Final   • Neutrophils Absolute 06/10/2021 2.90  1.70 - 7.00 10*3/mm3 Final   • Lymphocytes Absolute 06/10/2021 1.44  0.70 - 3.10 10*3/mm3 Final   • Monocytes Absolute 06/10/2021 0.36  0.10 - 0.90 10*3/mm3 Final   • Eosinophils Absolute 06/10/2021 0.14  0.00 - 0.40 10*3/mm3 Final   • Basophils Absolute 06/10/2021 0.04  0.00 - 0.20 10*3/mm3 Final   • Immature Granulocyte Rel % 06/10/2021 0.2  0.0 - 0.5 % Final   • Immature Grans Absolute 06/10/2021 0.01  0.00 - 0.05 10*3/mm3 Final   • nRBC 06/10/2021 0.0  0.0 - 0.2 /100 WBC Final   • Glucose 06/10/2021 95  65 - 99 mg/dL Final   • BUN 06/10/2021 15  8 - 23 mg/dL Final   • Creatinine 06/10/2021 0.77  0.57 - 1.00 mg/dL Final   • eGFR Non  Am 06/10/2021 74  >60 mL/min/1.73 Final    Comment: GFR Normal >60  Chronic Kidney Disease <60  Kidney Failure <15     • eGFR  Am 06/10/2021 90  >60 mL/min/1.73 Final   • BUN/Creatinine Ratio 06/10/2021 19.5  7.0 - 25.0 Final   • Sodium 06/10/2021 141  136 - 145 mmol/L Final   • Potassium 06/10/2021 4.3  3.5 - 5.2 mmol/L Final   • Chloride 06/10/2021 104  98 - 107 mmol/L Final   •  Total CO2 06/10/2021 23.8  22.0 - 29.0 mmol/L Final   • Calcium 06/10/2021 9.3  8.6 - 10.5 mg/dL Final   • Total Protein 06/10/2021 6.5  6.0 - 8.5 g/dL Final   • Albumin 06/10/2021 4.80  3.50 - 5.20 g/dL Final   • Globulin 06/10/2021 1.7  gm/dL Final   • A/G Ratio 06/10/2021 2.8  g/dL Final   • Total Bilirubin 06/10/2021 0.5  0.0 - 1.2 mg/dL Final   • Alkaline Phosphatase 06/10/2021 62  39 - 117 U/L Final   • AST (SGOT) 06/10/2021 27  1 - 32 U/L Final   • ALT (SGPT) 06/10/2021 39* 1 - 33 U/L Final   • Total Cholesterol 06/10/2021 217* 0 - 200 mg/dL Final    Comment: Cholesterol Reference Ranges  (U.S. Department of Health and Human Services ATP III  Classifications)  Desirable          <200 mg/dL  Borderline High    200-239 mg/dL  High Risk          >240 mg/dL  Triglyceride Reference Ranges  (U.S. Department of Health and Human Services ATP III  Classifications)  Normal           <150 mg/dL  Borderline High  150-199 mg/dL  High             200-499 mg/dL  Very High        >500 mg/dL  HDL Reference Ranges  (U.S. Department of Health and Human Services ATP III  Classifcations)  Low     <40 mg/dl (major risk factor for CHD)  High    >60 mg/dl ('negative' risk factor for CHD)  LDL Reference Ranges  (U.S. Department of Health and Human Services ATP III  Classifcations)  Optimal          <100 mg/dL  Near Optimal     100-129 mg/dL  Borderline High  130-159 mg/dL  High             160-189 mg/dL  Very High        >189 mg/dL     • Triglycerides 06/10/2021 290* 0 - 150 mg/dL Final   • HDL Cholesterol 06/10/2021 49  40 - 60 mg/dL Final   • VLDL Cholesterol Sukhi 06/10/2021 50* 5 - 40 mg/dL Final   • LDL Chol Calc ([facility]) 06/10/2021 118* 0 - 100 mg/dL Final   • LDL/HDL RATIO 06/10/2021 2.24   Final   • TSH 06/10/2021 3.460  0.270 - 4.200 uIU/mL Final   • 25 Hydroxy, Vitamin D 06/10/2021 43.0  30.0 - 100.0 ng/ml Final    Comment: Results may be falsely increased if patient taking Biotin.  Reference Range for Total Vitamin D  25(OH)  Deficiency <20.0 ng/mL  Insufficiency 21-29 ng/mL  Sufficiency  ng/mL  Toxicity >100 ng/ml         Current Outpatient Medications:   •  Cholecalciferol (VITAMIN D3) 2000 UNITS capsule, Take 1 capsule by mouth daily., Disp: , Rfl:   •  lisinopril (PRINIVIL,ZESTRIL) 20 MG tablet, Take 1 tablet by mouth Daily., Disp: 90 tablet, Rfl: 3  •  Multiple Vitamins-Minerals (Airborne Gummies) chewable tablet, Chew., Disp: , Rfl:   •  rosuvastatin (CRESTOR) 40 MG tablet, Take 1 tablet by mouth Every Night., Disp: 90 tablet, Rfl: 3  •  Synthroid 25 MCG tablet, TAKE 1 TABLET BY MOUTH EVERY DAY, Disp: 90 tablet, Rfl: 1  •  Viibryd 40 MG tablet tablet, TAKE 1 TABLET BY MOUTH EVERY DAY, Disp: 90 tablet, Rfl: 1  •  YUVAFEM 10 MCG tablet vaginal tablet, Insert 1 tablet into the vagina 2 (Two) Times a Week., Disp: 24 tablet, Rfl: 1  •  cetirizine (ZyrTEC) 10 MG tablet, Take 1 tablet by mouth daily., Disp: , Rfl:         Assessment/Plan   Diagnoses and all orders for this visit:    1. Acquired hypothyroidism (Primary)  -     CBC & Differential; Future  -     Comprehensive Metabolic Panel; Future  -     LP+LDL / HDL Ratio ([facility]); Future  -     TSH Rfx On Abnormal To Free T4; Future    2. Essential hypertension  -     CBC & Differential; Future  -     Comprehensive Metabolic Panel; Future  -     LP+LDL / HDL Ratio ([facility]); Future  -     TSH Rfx On Abnormal To Free T4; Future    3. Hyperlipidemia, unspecified hyperlipidemia type  -     CBC & Differential; Future  -     Comprehensive Metabolic Panel; Future  -     LP+LDL / HDL Ratio ([facility]); Future  -     TSH Rfx On Abnormal To Free T4; Future  -     rosuvastatin (CRESTOR) 40 MG tablet; Take 1 tablet by mouth Every Night.  Dispense: 90 tablet; Refill: 3    4. Anxiety    Other orders  -     lisinopril (PRINIVIL,ZESTRIL) 20 MG tablet; Take 1 tablet by mouth Daily.  Dispense: 90 tablet; Refill: 3      1.  HTN- ok with current meds  2.  HPL- ok with crestor  3.  Hypothyroidism- ok with current meds  4. Anxiety-  Ok with current meds  though she is under stress from daughter divorce  She is cutting in half

## 2021-08-03 RX ORDER — LEVOTHYROXINE SODIUM 25 MCG
TABLET ORAL
Qty: 90 TABLET | Refills: 1 | Status: SHIPPED | OUTPATIENT
Start: 2021-08-03 | End: 2022-02-21

## 2021-09-28 ENCOUNTER — TELEPHONE (OUTPATIENT)
Dept: INTERNAL MEDICINE | Facility: CLINIC | Age: 71
End: 2021-09-28

## 2021-09-28 NOTE — TELEPHONE ENCOUNTER
MIGUEL:     PATIENT STATED THAT THE PHARMACY CALLED IN REQUEST FOR PRIOR AUTH. FOR MEDICATION     SHE IS LEAVING SOON AND NEEDING CALLED IN TOMORROW.       (DO YOU HAVE ANY SAMPLES TO PROVIDE PATIENT)      CAN YOU CALL PATIENT Wednesday TO TALK TO HER    Dian Jefferson (Self) [phone] (H)

## 2021-09-28 NOTE — TELEPHONE ENCOUNTER
Caller: Dian Jefferson    Relationship to patient: Self    Best call back number: [phone]    Patient is needing: PATIENT IS NEEDING A PRIOR AUTHORIZATION FOR THE    Viibryd 40 MG tablet tablet     PLEASE CONTACT PHARMACY     PATIENT IS GOING OUT OF TOWN ON Thursday AND NEEDS THIS BEFORE THEN

## 2021-12-14 DIAGNOSIS — U07.1 COVID-19: Primary | ICD-10-CM

## 2021-12-15 LAB — SARS-COV-2 AB SERPL QL IA: POSITIVE

## 2021-12-21 ENCOUNTER — OFFICE VISIT (OUTPATIENT)
Dept: INTERNAL MEDICINE | Facility: CLINIC | Age: 71
End: 2021-12-21

## 2021-12-21 VITALS
DIASTOLIC BLOOD PRESSURE: 72 MMHG | HEART RATE: 84 BPM | TEMPERATURE: 97.3 F | SYSTOLIC BLOOD PRESSURE: 126 MMHG | HEIGHT: 65 IN | BODY MASS INDEX: 31.62 KG/M2 | OXYGEN SATURATION: 98 % | WEIGHT: 189.8 LBS

## 2021-12-21 DIAGNOSIS — Z00.00 MEDICARE ANNUAL WELLNESS VISIT, SUBSEQUENT: Primary | ICD-10-CM

## 2021-12-21 DIAGNOSIS — Z12.11 SCREENING FOR COLON CANCER: ICD-10-CM

## 2021-12-21 DIAGNOSIS — K21.9 GASTROESOPHAGEAL REFLUX DISEASE, UNSPECIFIED WHETHER ESOPHAGITIS PRESENT: ICD-10-CM

## 2021-12-21 DIAGNOSIS — E78.5 HYPERLIPIDEMIA, UNSPECIFIED HYPERLIPIDEMIA TYPE: ICD-10-CM

## 2021-12-21 DIAGNOSIS — I10 ESSENTIAL HYPERTENSION: ICD-10-CM

## 2021-12-21 DIAGNOSIS — G89.29 CHRONIC RIGHT-SIDED LOW BACK PAIN WITHOUT SCIATICA: ICD-10-CM

## 2021-12-21 DIAGNOSIS — M54.50 CHRONIC RIGHT-SIDED LOW BACK PAIN WITHOUT SCIATICA: ICD-10-CM

## 2021-12-21 DIAGNOSIS — Z23 NEED FOR INFLUENZA VACCINATION: ICD-10-CM

## 2021-12-21 DIAGNOSIS — E03.9 ACQUIRED HYPOTHYROIDISM: ICD-10-CM

## 2021-12-21 LAB
BILIRUB BLD-MCNC: NEGATIVE MG/DL
CLARITY, POC: CLEAR
COLOR UR: YELLOW
EXPIRATION DATE: ABNORMAL
GLUCOSE UR STRIP-MCNC: NEGATIVE MG/DL
KETONES UR QL: NEGATIVE
LEUKOCYTE EST, POC: NEGATIVE
Lab: ABNORMAL
NITRITE UR-MCNC: NEGATIVE MG/ML
PH UR: 5 [PH] (ref 5–8)
PROT UR STRIP-MCNC: ABNORMAL MG/DL
RBC # UR STRIP: ABNORMAL /UL
SP GR UR: 1.03 (ref 1–1.03)
UROBILINOGEN UR QL: NORMAL

## 2021-12-21 PROCEDURE — 1125F AMNT PAIN NOTED PAIN PRSNT: CPT | Performed by: INTERNAL MEDICINE

## 2021-12-21 PROCEDURE — 81003 URINALYSIS AUTO W/O SCOPE: CPT | Performed by: INTERNAL MEDICINE

## 2021-12-21 PROCEDURE — 1170F FXNL STATUS ASSESSED: CPT | Performed by: INTERNAL MEDICINE

## 2021-12-21 PROCEDURE — G0008 ADMIN INFLUENZA VIRUS VAC: HCPCS | Performed by: INTERNAL MEDICINE

## 2021-12-21 PROCEDURE — 99214 OFFICE O/P EST MOD 30 MIN: CPT | Performed by: INTERNAL MEDICINE

## 2021-12-21 PROCEDURE — 1159F MED LIST DOCD IN RCRD: CPT | Performed by: INTERNAL MEDICINE

## 2021-12-21 PROCEDURE — 90662 IIV NO PRSV INCREASED AG IM: CPT | Performed by: INTERNAL MEDICINE

## 2021-12-21 PROCEDURE — G0439 PPPS, SUBSEQ VISIT: HCPCS | Performed by: INTERNAL MEDICINE

## 2021-12-21 RX ORDER — VILAZODONE HYDROCHLORIDE 40 MG/1
40 TABLET ORAL DAILY
Qty: 90 TABLET | Refills: 1 | Status: SHIPPED | OUTPATIENT
Start: 2021-12-21 | End: 2023-03-21

## 2021-12-21 RX ORDER — MELOXICAM 7.5 MG/1
TABLET ORAL
Qty: 30 TABLET | Refills: 2 | Status: SHIPPED | OUTPATIENT
Start: 2021-12-21

## 2021-12-21 RX ORDER — BACLOFEN 10 MG/1
10 TABLET ORAL 3 TIMES DAILY
Qty: 30 TABLET | Refills: 0 | Status: SHIPPED | OUTPATIENT
Start: 2021-12-21

## 2021-12-21 NOTE — PROGRESS NOTES
Subjective   Dian Jefferson is a 71 y.o. female here today to f/u on HTN, hyperlipidemia, hypothyroidism and anxiety.  Pt c/o breathing different since having covid.      History of Present Illness   Pt has been taking BP meds as prescribed without any problems.  No HA  No episodes of orthostasis  Pt has been taking cholesterol meds as prescribed.  No difficulties with myalgias.   Pt has been doing well with thyroid meds.  Taking as perscribed without any complications  Ok with viibryd for depression  She does use vagifem prn    The following portions of the patient's history were reviewed and updated as appropriate: allergies, current medications, past medical history, past social history and problem list.no tob no etoh    Review of Systems    Objective   Physical Exam  Vitals reviewed.   Constitutional:       Appearance: She is well-developed.   HENT:      Head: Normocephalic and atraumatic.      Right Ear: External ear normal.      Left Ear: External ear normal.   Eyes:      Conjunctiva/sclera: Conjunctivae normal.      Pupils: Pupils are equal, round, and reactive to light.   Neck:      Thyroid: No thyromegaly.      Trachea: No tracheal deviation.   Cardiovascular:      Rate and Rhythm: Normal rate and regular rhythm.      Heart sounds: Normal heart sounds.   Pulmonary:      Effort: Pulmonary effort is normal.      Breath sounds: Normal breath sounds.   Abdominal:      General: Bowel sounds are normal. There is no distension.      Palpations: Abdomen is soft.      Tenderness: There is no abdominal tenderness.   Musculoskeletal:         General: No deformity. Normal range of motion.      Cervical back: Normal range of motion.   Skin:     General: Skin is warm and dry.   Neurological:      Mental Status: She is alert and oriented to person, place, and time.   Psychiatric:         Behavior: Behavior normal.         Thought Content: Thought content normal.         Judgment: Judgment normal.         Vitals:     12/21/21 1532   BP: 126/72   Pulse: 84   Temp: 97.3 °F (36.3 °C)   SpO2: 98%     Body mass index is 31.58 kg/m².       Results Encounter on 12/15/2021   Component Date Value Ref Range Status   • WBC 12/14/2021 4.6  3.4 - 10.8 x10E3/uL Final   • RBC 12/14/2021 4.33  3.77 - 5.28 x10E6/uL Final   • Hemoglobin 12/14/2021 13.2  11.1 - 15.9 g/dL Final   • Hematocrit 12/14/2021 38.9  34.0 - 46.6 % Final   • MCV 12/14/2021 90  79 - 97 fL Final   • MCH 12/14/2021 30.5  26.6 - 33.0 pg Final   • MCHC 12/14/2021 33.9  31.5 - 35.7 g/dL Final   • RDW 12/14/2021 12.9  11.7 - 15.4 % Final   • Platelets 12/14/2021 290  150 - 450 x10E3/uL Final   • Neutrophil Rel % 12/14/2021 58  Not Estab. % Final   • Lymphocyte Rel % 12/14/2021 31  Not Estab. % Final   • Monocyte Rel % 12/14/2021 7  Not Estab. % Final   • Eosinophil Rel % 12/14/2021 3  Not Estab. % Final   • Basophil Rel % 12/14/2021 1  Not Estab. % Final   • Neutrophils Absolute 12/14/2021 2.7  1.4 - 7.0 x10E3/uL Final   • Lymphocytes Absolute 12/14/2021 1.4  0.7 - 3.1 x10E3/uL Final   • Monocytes Absolute 12/14/2021 0.3  0.1 - 0.9 x10E3/uL Final   • Eosinophils Absolute 12/14/2021 0.2  0.0 - 0.4 x10E3/uL Final   • Basophils Absolute 12/14/2021 0.0  0.0 - 0.2 x10E3/uL Final   • Immature Granulocyte Rel % 12/14/2021 0  Not Estab. % Final   • Immature Grans Absolute 12/14/2021 0.0  0.0 - 0.1 x10E3/uL Final   • Glucose 12/14/2021 97  65 - 99 mg/dL Final   • BUN 12/14/2021 12  8 - 27 mg/dL Final   • Creatinine 12/14/2021 0.88  0.57 - 1.00 mg/dL Final   • eGFR Non  Am 12/14/2021 67  >59 mL/min/1.73 Final   • eGFR African Am 12/14/2021 78  >59 mL/min/1.73 Final    Comment: **In accordance with recommendations from the NKF-ASN Task force,**    [facility] is in the process of updating its eGFR calculation to the    2021 CKD-EPI creatinine equation that estimates kidney function    without a race variable.     • BUN/Creatinine Ratio 12/14/2021 14  12 - 28 Final   • Sodium 12/14/2021  145* 134 - 144 mmol/L Final   • Potassium 12/14/2021 4.9  3.5 - 5.2 mmol/L Final   • Chloride 12/14/2021 109* 96 - 106 mmol/L Final   • Total CO2 12/14/2021 21  20 - 29 mmol/L Final   • Calcium 12/14/2021 9.2  8.7 - 10.3 mg/dL Final   • Total Protein 12/14/2021 6.4  6.0 - 8.5 g/dL Final   • Albumin 12/14/2021 4.3  3.8 - 4.8 g/dL Final   • Globulin 12/14/2021 2.1  1.5 - 4.5 g/dL Final   • A/G Ratio 12/14/2021 2.0  1.2 - 2.2 Final   • Total Bilirubin 12/14/2021 0.3  0.0 - 1.2 mg/dL Final   • Alkaline Phosphatase 12/14/2021 67  44 - 121 IU/L Final                  **Please note reference interval change**   • AST (SGOT) 12/14/2021 27  0 - 40 IU/L Final   • ALT (SGPT) 12/14/2021 38* 0 - 32 IU/L Final   • Total Cholesterol 12/14/2021 198  100 - 199 mg/dL Final   • Triglycerides 12/14/2021 180* 0 - 149 mg/dL Final   • HDL Cholesterol 12/14/2021 48  >39 mg/dL Final   • VLDL Cholesterol Sukhi 12/14/2021 32  5 - 40 mg/dL Final   • LDL Chol Calc ([facility]) 12/14/2021 118* 0 - 99 mg/dL Final   • LDL/HDL RATIO 12/14/2021 2.5  0.0 - 3.2 ratio Final    Comment:                                     LDL/HDL Ratio                                              Men  Women                                1/2 Avg.Risk  1.0    1.5                                    Avg.Risk  3.6    3.2                                 2X Avg.Risk  6.2    5.0                                 3X Avg.Risk  8.0    6.1     • TSH 12/14/2021 1.930  0.450 - 4.500 uIU/mL Final     Current Outpatient Medications   Medication Instructions   • cetirizine (ZyrTEC) 10 MG tablet 1 tablet, Oral, Daily   • Cholecalciferol (VITAMIN D3) 2000 UNITS capsule 1 capsule, Oral, Daily   • lisinopril (PRINIVIL,ZESTRIL) 20 mg, Oral, Daily   • Multiple Vitamins-Minerals (Airborne Gummies) chewable tablet Oral   • rosuvastatin (CRESTOR) 40 mg, Oral, Nightly   • Synthroid 25 MCG tablet TAKE 1 TABLET BY MOUTH EVERY DAY   • Viibryd 40 MG tablet tablet TAKE 1 TABLET BY MOUTH EVERY DAY   • Yuvafem 10  mcg, Vaginal, 2 Times Weekly         Assessment/Plan   Diagnoses and all orders for this visit:    1. Medicare annual wellness visit, subsequent (Primary)    2. Hyperlipidemia, unspecified hyperlipidemia type    3. Essential hypertension    4. Acquired hypothyroidism    5. Gastroesophageal reflux disease, unspecified whether esophagitis present    6. Screening for colon cancer  -     Ambulatory Referral For Screening Colonoscopy    7. Chronic right-sided low back pain without sciatica  -     Ambulatory Referral to Pain Management    1.  HTN- ok with current meds  2.  Hypothyroidism- ok with current meds  3. GERD- ok with current meds  4.  HPL-  Ok with crestor  5.  LBP- on the right  Sig degenerative disease of the spine  Refer to pain management

## 2021-12-21 NOTE — PROGRESS NOTES
The ABCs of the Annual Wellness Visit  Subsequent Medicare Wellness Visit    Chief Complaint   Patient presents with   • Medicare Wellness-subsequent   • Hypertension   • Hyperlipidemia   • Hypothyroidism   • Anxiety      Subjective    History of Present Illness:  Dian Jefferson is a 71 y.o. female who presents for a Subsequent Medicare Wellness Visit.    The following portions of the patient's history were reviewed and   updated as appropriate: allergies, current medications, past medical history, past social history and problem list.    Compared to one year ago, the patient feels her physical   health is worse. Due to back pain    Compared to one year ago, the patient feels her mental   health is the same.    Recent Hospitalizations:  She was not admitted to the hospital during the last year.       Current Medical Providers:  Patient Care Team:  Mallika Stewart MD as PCP - General (Internal Medicine)  Britney Cole MD as Consulting Physician (Obstetrics and Gynecology)    Outpatient Medications Prior to Visit   Medication Sig Dispense Refill   • cetirizine (ZyrTEC) 10 MG tablet Take 1 tablet by mouth daily.     • Cholecalciferol (VITAMIN D3) 2000 UNITS capsule Take 1 capsule by mouth daily.     • lisinopril (PRINIVIL,ZESTRIL) 20 MG tablet Take 1 tablet by mouth Daily. 90 tablet 3   • Multiple Vitamins-Minerals (Airborne Gummies) chewable tablet Chew.     • rosuvastatin (CRESTOR) 40 MG tablet Take 1 tablet by mouth Every Night. 90 tablet 3   • Synthroid 25 MCG tablet TAKE 1 TABLET BY MOUTH EVERY DAY 90 tablet 1   • Viibryd 40 MG tablet tablet TAKE 1 TABLET BY MOUTH EVERY DAY 90 tablet 1   • YUVAFEM 10 MCG tablet vaginal tablet Insert 1 tablet into the vagina 2 (Two) Times a Week. 24 tablet 1     No facility-administered medications prior to visit.       No opioid medication identified on active medication list. I have reviewed chart for other potential  high risk medication/s and harmful drug interactions in the  "elderly.          Aspirin is not on active medication list.  Aspirin use is not indicated based on review of current medical condition/s. Risk of harm outweighs potential benefits.  .    Patient Active Problem List   Diagnosis   • Anxiety   • Gastroesophageal reflux disease   • Menopausal flushing   • Essential hypertension   • Hyperlipidemia   • Hypothyroidism   • Abnormal mammogram   • Breast implant rupture   • Vitamin D deficiency   • Tubular adenoma of colon   • Sigmoid diverticulosis   • Internal hemorrhoids   • DDD (degenerative disc disease), lumbar   • Spinal stenosis, lumbar region, with neurogenic claudication     Advance Care Planning  Advance Directive is not on file.  ACP discussion was held with the patient during this visit. Patient has an advance directive (not in EMR), copy requested.          Objective    Vitals:    12/21/21 1532   BP: 126/72   BP Location: Left arm   Patient Position: Sitting   Pulse: 84   Temp: 97.3 °F (36.3 °C)   TempSrc: Infrared   SpO2: 98%   Weight: 86.1 kg (189 lb 12.8 oz)   Height: 165.1 cm (65\")   PainSc:   4     BMI Readings from Last 1 Encounters:   12/21/21 31.58 kg/m²   BMI is above normal parameters. Recommendations include: exercise counseling and nutrition counseling    Does the patient have evidence of cognitive impairment? No    Physical Exam  Lab Results   Component Value Date    CHLPL 198 12/14/2021    TRIG 180 (H) 12/14/2021    HDL 48 12/14/2021     (H) 12/14/2021    VLDL 32 12/14/2021            HEALTH RISK ASSESSMENT    Smoking Status:  Social History     Tobacco Use   Smoking Status Never Smoker   Smokeless Tobacco Never Used     Alcohol Consumption:  Social History     Substance and Sexual Activity   Alcohol Use Yes    Comment: OCC. USE     Fall Risk Screen:    STEADI Fall Risk Assessment was completed, and patient is at HIGH risk for falls. Assessment completed on:12/21/2021    Depression Screening:  PHQ-2/PHQ-9 Depression Screening 12/21/2021 "   Little interest or pleasure in doing things 0   Feeling down, depressed, or hopeless 0   Trouble falling or staying asleep, or sleeping too much 2   Feeling tired or having little energy 0   Poor appetite or overeating 0   Feeling bad about yourself - or that you are a failure or have let yourself or your family down 0   Trouble concentrating on things, such as reading the newspaper or watching television 0   Moving or speaking so slowly that other people could have noticed. Or the opposite - being so fidgety or restless that you have been moving around a lot more than usual 0   Thoughts that you would be better off dead, or of hurting yourself in some way 0   Total Score 2   If you checked off any problems, how difficult have these problems made it for you to do your work, take care of things at home, or get along with other people? Not difficult at all       Health Habits and Functional and Cognitive Screening:  Functional & Cognitive Status 12/21/2021   Do you have difficulty preparing food and eating? No   Do you have difficulty bathing yourself, getting dressed or grooming yourself? No   Do you have difficulty using the toilet? No   Do you have difficulty moving around from place to place? No   Do you have trouble with steps or getting out of a bed or a chair? No   Current Diet Limited Junk Food        Current Diet Comment -   Dental Exam Up to date   Eye Exam Up to date   Exercise (times per week) 5 times per week   Current Exercises Include Walking   Current Exercise Activities Include -   Do you need help using the phone?  No   Are you deaf or do you have serious difficulty hearing?  Yes   Do you need help with transportation? No   Do you need help shopping? No   Do you need help preparing meals?  No   Do you need help with housework?  No   Do you need help with laundry? No   Do you need help taking your medications? No   Do you need help managing money? No   Do you ever drive or ride in a car without  wearing a seat belt? No   Have you felt unusual stress, anger or loneliness in the last month? No   Who do you live with? Spouse   If you need help, do you have trouble finding someone available to you? Yes   Have you been bothered in the last four weeks by sexual problems? No   Do you have difficulty concentrating, remembering or making decisions? No       Age-appropriate Screening Schedule:  Refer to the list below for future screening recommendations based on patient's age, sex and/or medical conditions. Orders for these recommended tests are listed in the plan section. The patient has been provided with a written plan.    Health Maintenance   Topic Date Due   • DXA SCAN  Never done   • INFLUENZA VACCINE  12/21/2022 (Originally 8/1/2021)   • LIPID PANEL  12/14/2022   • MAMMOGRAM  04/09/2023   • TDAP/TD VACCINES (2 - Td or Tdap) 07/13/2027   • PAP SMEAR  Discontinued   • ZOSTER VACCINE  Discontinued              Assessment/Plan   CMS Preventative Services Quick Reference  Risk Factors Identified During Encounter  Chronic Pain chronic pain in back   Makes it hard to exercise  The above risks/problems have been discussed with the patient.  Follow up actions/plans if indicated are seen below in the Assessment/Plan Section.  Pertinent information has been shared with the patient in the After Visit Summary.    Diagnoses and all orders for this visit:    1. Hyperlipidemia, unspecified hyperlipidemia type (Primary)    2. Essential hypertension    3. Acquired hypothyroidism    4. Gastroesophageal reflux disease, unspecified whether esophagitis present    5. Screening for colon cancer  -     Ambulatory Referral For Screening Colonoscopy        Follow Up:   No follow-ups on file.     An After Visit Summary and PPPS were made available to the patient.

## 2022-02-01 ENCOUNTER — TELEPHONE (OUTPATIENT)
Dept: INTERNAL MEDICINE | Facility: CLINIC | Age: 72
End: 2022-02-01

## 2022-02-01 DIAGNOSIS — E78.5 HYPERLIPIDEMIA, UNSPECIFIED HYPERLIPIDEMIA TYPE: ICD-10-CM

## 2022-02-01 RX ORDER — ROSUVASTATIN CALCIUM 40 MG/1
40 TABLET, COATED ORAL NIGHTLY
Qty: 90 TABLET | Refills: 3 | Status: SHIPPED | OUTPATIENT
Start: 2022-02-01 | End: 2023-01-10

## 2022-02-01 RX ORDER — LISINOPRIL 20 MG/1
20 TABLET ORAL DAILY
Qty: 90 TABLET | Refills: 3 | Status: SHIPPED | OUTPATIENT
Start: 2022-02-01 | End: 2023-01-10

## 2022-02-01 NOTE — TELEPHONE ENCOUNTER
Caller: Dian Jefferson    Relationship: Self    Best call back number: [phone]    Requested Prescriptions:   Requested Prescriptions     Pending Prescriptions Disp Refills   • rosuvastatin (CRESTOR) 40 MG tablet 90 tablet 3     Sig: Take 1 tablet by mouth Every Night.   • lisinopril (PRINIVIL,ZESTRIL) 20 MG tablet 90 tablet 3     Sig: Take 1 tablet by mouth Daily.        Pharmacy where request should be sent: [facility] [address] [place] [address] [address] - [phone] [facility] [phone] FX     Additional details provided by patient: PATIENT IS NEEDING THESE PRESCRIPTIONS SENT OVER TO [facility] [phone] BEFORE 6:00 PM.  MOVING FORWARD ALL PRESCRIPTION NEEDS TO BE SENT TO [facility]'S ATTENTION: GOVIND.      Does the patient have less than a 3 day supply:  [x] Yes  [] No    Terry Mason Rep   02/01/22 14:18 EST

## 2022-02-01 NOTE — TELEPHONE ENCOUNTER
PLEASE SENT CURRENT PRESCRIPTIONS TO [id number] [phone].  SO SHE CAN GET HER PRESCRIPTIONS WHEN THEY COME DUE , PLUS A PRIOR AUTHORIZATION FOR THE THE NAME BRAND SYNTHROID AND VIIBRYD SINCE SHE IS CHANGING PHARMACIES.

## 2022-02-16 NOTE — H&P (VIEW-ONLY)
The patient has a pain history of the following:  Chronic low back pain    Previous interventions that the patient has received include:   None     Pain medications include:  Baclofen  Meloxicam    Other conservative modalities which the patient reports using include:  Physical Therapy: yes  Chiropractor: yes  Massage Therapy: no  TENS: yes  Neck or back surgery: no  Past pain management: no  Dry Needling    Past Significant Surgical History:  None     HPI:       CHIEF COMPLAINT: Back Pain    Dian Jefferson is a 72 y.o. female referred here by Mallika Stewart MD. Dian Jefferson presents to the office for evaluation and treatment of Back Pain      Onset:  3 years  Inciting Event:  Nothing in particular   Location:  Across the low back  Pain: Pain described as ache. Located across the low back and does radiate into the hips bilaterally.  Severity:  Pain rated as a 2 /10.  Apportions pain as 100%  back pain and 0% extremity pain.  Symptoms have been episodic.  Exacerbation:  Sitting or standing or walking for more than 15 mintues.   Alleviation:  Lying flat.  Associated Symptoms:   She denies any new onset of bowel or bladder weakness, significant leg weakness or saddle anesthesia. Denies balance problems or lower extremity incoordination.  Ambulates: Without assistive device   Limitations: This pain limits the patient from going on walks and cooking like she wants  Goals: Functional goals include ability to do the above.         PEG Assessment   What number best describes your pain on average in the past week?8  What number best describes how, during the past week, pain has interfered with your enjoyment of life?10  What number best describes how, during the past week, pain has interfered with your general activity?  5        Current Outpatient Medications:   •  baclofen (LIORESAL) 10 MG tablet, Take 1 tablet by mouth 3 (Three) Times a Day., Disp: 30 tablet, Rfl: 0  •  cetirizine (ZyrTEC) 10 MG tablet, Take 1 tablet by  mouth daily., Disp: , Rfl:   •  Cholecalciferol (VITAMIN D3) 2000 UNITS capsule, Take 1 capsule by mouth daily., Disp: , Rfl:   •  lisinopril (PRINIVIL,ZESTRIL) 20 MG tablet, Take 1 tablet by mouth Daily., Disp: 90 tablet, Rfl: 3  •  meloxicam (Mobic) 7.5 MG tablet, 1-2 tabs daily as needed, Disp: 30 tablet, Rfl: 2  •  Multiple Vitamins-Minerals (Airborne Gummies) chewable tablet, Chew., Disp: , Rfl:   •  rosuvastatin (CRESTOR) 40 MG tablet, Take 1 tablet by mouth Every Night., Disp: 90 tablet, Rfl: 3  •  Synthroid 25 MCG tablet, TAKE 1 TABLET BY MOUTH EVERY DAY, Disp: 90 tablet, Rfl: 1  •  vilazodone (Viibryd) 40 MG tablet tablet, Take 1 tablet by mouth Daily., Disp: 90 tablet, Rfl: 1  •  YUVAFEM 10 MCG tablet vaginal tablet, Insert 1 tablet into the vagina 2 (Two) Times a Week., Disp: 24 tablet, Rfl: 1    The following portions of the patient's history were reviewed and updated as appropriate: allergies, current medications, past family history, past medical history, past social history, past surgical history and problem list.      REVIEW OF PERTINENT MEDICAL DATA    8/27/19 MRI LUMBAR SPINE WITHOUT CONTRAST 08/27/2019     CLINICAL HISTORY: Low back pain for greater than 6 weeks following  conservative treatment and its persistent, progressive, surgical  candidate, acute right-sided low back pain with pain radiating down  right leg, right-sided radiculopathy.     TECHNIQUE: Sagittal T1, proton-density and fat-suppressed T2-weighted  images were obtained of the lumbar spine, in addition axial T2-weighted  images were obtained from T12 to S2, thin cut axial T1-weighted images  were obtained angled through the interspaces from L3 to S1.     COMPARISON: There are no prior lumbar spine imaging studies from [facility] for comparison.     FINDINGS: Distal thoracic cord and conus is normal in signal intensity,  conus terminates at the L1-L2 interspace level which is normal.     At T11-T12, the disc space  and facets are normal with no canal or  foraminal narrowing.     At T12-L1, disc space is normal. There is mild bilateral facet  overgrowth. There is no central canal or foraminal narrowing.     At L1-L2, the disc space is normal. There is moderate bilateral facet  overgrowth. There is no canal or foraminal narrowing.     At L2-L3, there is moderate bilateral facet overgrowth, minimal disc  desiccation, but the posterior disc margin is normal. Overgrown facets  indent the right and left posterior lateral aspect of the thecal sac,  and there is mild narrowing of the thecal sac. There is no foraminal  narrowing.     At L3-L4, there is moderate-to-severe bilateral facet overgrowth, some  fluid in the left facet joint. There is a 3 mm degenerative  anterolisthesis of L3 and L4, minimal diffuse posterior disc bulge and  there is mild-to-moderate narrowing of the anteroposterior diameter of  the thecal sac, and there is mild bilateral foraminal narrowing.     At L4-L5, there is moderate-to-severe bilateral facet overgrowth, 3 mm  degenerative anterolisthesis of L4 and L5, mild posterior disc bulge,  and there is mild-to-moderate narrowing of the thecal sac. There is  slight narrowing of the right lateral recess with anteromedial right  facet spurs abutting the posterior lateral margin of the traversing  right L5 nerve root. There is essentially no foraminal narrowing.     At L5-S1, there is moderate bilateral facet overgrowth. There is right  posterior lateral to medial foraminal small disc protrusion that abuts  the ventral aspect of the traversing right S1 nerve root, slightly  narrows the right lateral recess. There is no central canal or left  lateral recess or foraminal narrowing.     IMPRESSION:  1. There is small right posterior lateral to medial foraminal disc  protrusion that abuts the ventral aspect of the traversing right S1  nerve root, slightly narrowing the right lateral recess at L5-S1. There  is  mild-to-moderate left and moderate-to-severe right facet overgrowth.  There is no canal or foraminal narrowing at L5-S1.  2. There is moderate-to-severe bilateral facet overgrowth, 3 mm  degenerative anterolisthesis of L3 on L4. There is at least  mild-to-moderate narrowing of the thecal sac and mild bilateral  foraminal narrowing at L3-L4.  3. There is moderate-to-severe bilateral facet overgrowth at L4-L5 with  3 mm degenerative anterolisthesis of L4 on L5, mild diffuse posterior  disc bulge and there is at least mild-to-moderate narrowing of the  thecal sac, and some narrowing of the right lateral recess with  anteromedial right facet spurs abutting the posterior lateral margin of  the traversing right L5 nerve root, but there is no foraminal narrowing  at L4-L5.  4. There is moderate bilateral facet overgrowth at L2-L3, facets and  spurs indent the right and left posterior lateral aspect of the thecal  sac mildly narrowing the thecal sac, but there is no foraminal narrowing  at L2-L3. The remainder of the lumbar spine MRI is unremarkable.     This report was finalized on 8/28/2019 8:34 AM by Dr. Michelet Carmona M.D.    12/14/21 Creatinine 0.88, Platelets 290 (10*3)    Review of Systems   Constitutional: Negative for fatigue.   HENT: Negative for congestion.    Eyes: Negative for visual disturbance.   Respiratory: Negative for shortness of breath.    Cardiovascular: Negative for chest pain.   Gastrointestinal: Negative for constipation and diarrhea.   Genitourinary: Negative for difficulty urinating.   Musculoskeletal: Positive for back pain.   Neurological: Negative for weakness and numbness.   Psychiatric/Behavioral: Positive for sleep disturbance. Negative for suicidal ideas. The patient is not nervous/anxious.      I have reviewed and confirmed the accuracy of the ROS as documented by the MA/LPN/RN Mildred Mirza MD      Vitals:    02/17/22 1433   BP: 100/67   Pulse: 88   Resp: 18   Temp: 96.7 °F (35.9 °C)  "  SpO2: 96%   Weight: 86.4 kg (190 lb 6.4 oz)   Height: 165.1 cm (65\")   PainSc:   2   PainLoc: Back         Objective   Physical Exam  Vitals reviewed.   Constitutional:       General: She is not in acute distress.  Pulmonary:      Effort: Pulmonary effort is normal. No respiratory distress.   Musculoskeletal:      Comments: Ambulation: Without assistive device  Lumbar Exam:  Appearance: Scoliotic curve absent and scarring absent  Able to tandem, toe, and heel walk: Yes  Palpated over lumbosacral paravertebral regions and transverse processes with positive tenderness appreciated, Bilateral.   Sacroiliac joints are not tender, Bilateral.  Trochanteric bursa are tender, Bilateral.  Straight leg raise is negative radiculopathy, Bilateral.  Slump test is negative  radiculopathy, Bilateral.  Facet loading is positive for pain, Bilateral.  Paraspinal/adjacent lumbar musculature are not tender to palpation, Bilateral.  Desmond Erin's test is negative sacroiliac pain, Bilateral.   Skin:     General: Skin is warm and dry.   Neurological:      General: No focal deficit present.      Mental Status: She is alert.      Deep Tendon Reflexes:      Reflex Scores:       Patellar reflexes are 2+ on the right side and 2+ on the left side.     Comments: Negative clonus bilaterally    Psychiatric:         Mood and Affect: Mood normal.         Thought Content: Thought content normal.         Assessment/Plan   Diagnoses and all orders for this visit:    1. Spondylosis of lumbar region without myelopathy or radiculopathy (Primary)  -     Case Request    2. Trochanteric bursitis of both hips    3. Chronic bilateral low back pain without sciatica  -     Case Request        - Pertinent labs reviewed.   - Pertinent imaging reviewed.   - Has failed conservative therapy.    - Will schedule for bilateral ~L2-L5 Medial branch blocks diagnostic x2 (2 weeks apart).  If good results, plan for Radiofrequency ablation (thermal, non-pulsed).  Risks " discussed including but not limited to bleeding, bruising, infection, damage to surrounding structures, headache, and rare things such as being paralyzed, seizure, stroke, heart attack and death.    - Will consider bilateral trochanteric bursa injections in the future if bursitis continues to cause her pain.     --- Follow-up for bilateral ~L2-L5 Medial branch blocks diagnostic x2 (2 weeks apart) and office visit 1-2 weeks after.            While examining this patient, I wore protective equipment including a mask, eye shield and gloves.  I washed my hands before and after this patient encounter.  The patient wore a mask throughout the visit as well.     Mildred Mirza MD  Pain Management

## 2022-02-16 NOTE — PROGRESS NOTES
The patient has a pain history of the following:  Chronic low back pain    Previous interventions that the patient has received include:   None     Pain medications include:  Baclofen  Meloxicam    Other conservative modalities which the patient reports using include:  Physical Therapy: yes  Chiropractor: yes  Massage Therapy: no  TENS: yes  Neck or back surgery: no  Past pain management: no  Dry Needling    Past Significant Surgical History:  None     HPI:       CHIEF COMPLAINT: Back Pain    Dian Jefferson is a 72 y.o. female referred here by Mallika Stewart MD. Dian Jefferson presents to the office for evaluation and treatment of Back Pain      Onset:  3 years  Inciting Event:  Nothing in particular   Location:  Across the low back  Pain: Pain described as ache. Located across the low back and does radiate into the hips bilaterally.  Severity:  Pain rated as a 2 /10.  Apportions pain as 100%  back pain and 0% extremity pain.  Symptoms have been episodic.  Exacerbation:  Sitting or standing or walking for more than 15 mintues.   Alleviation:  Lying flat.  Associated Symptoms:   She denies any new onset of bowel or bladder weakness, significant leg weakness or saddle anesthesia. Denies balance problems or lower extremity incoordination.  Ambulates: Without assistive device   Limitations: This pain limits the patient from going on walks and cooking like she wants  Goals: Functional goals include ability to do the above.         PEG Assessment   What number best describes your pain on average in the past week?8  What number best describes how, during the past week, pain has interfered with your enjoyment of life?10  What number best describes how, during the past week, pain has interfered with your general activity?  5        Current Outpatient Medications:   •  baclofen (LIORESAL) 10 MG tablet, Take 1 tablet by mouth 3 (Three) Times a Day., Disp: 30 tablet, Rfl: 0  •  cetirizine (ZyrTEC) 10 MG tablet, Take 1 tablet by  mouth daily., Disp: , Rfl:   •  Cholecalciferol (VITAMIN D3) 2000 UNITS capsule, Take 1 capsule by mouth daily., Disp: , Rfl:   •  lisinopril (PRINIVIL,ZESTRIL) 20 MG tablet, Take 1 tablet by mouth Daily., Disp: 90 tablet, Rfl: 3  •  meloxicam (Mobic) 7.5 MG tablet, 1-2 tabs daily as needed, Disp: 30 tablet, Rfl: 2  •  Multiple Vitamins-Minerals (Airborne Gummies) chewable tablet, Chew., Disp: , Rfl:   •  rosuvastatin (CRESTOR) 40 MG tablet, Take 1 tablet by mouth Every Night., Disp: 90 tablet, Rfl: 3  •  Synthroid 25 MCG tablet, TAKE 1 TABLET BY MOUTH EVERY DAY, Disp: 90 tablet, Rfl: 1  •  vilazodone (Viibryd) 40 MG tablet tablet, Take 1 tablet by mouth Daily., Disp: 90 tablet, Rfl: 1  •  YUVAFEM 10 MCG tablet vaginal tablet, Insert 1 tablet into the vagina 2 (Two) Times a Week., Disp: 24 tablet, Rfl: 1    The following portions of the patient's history were reviewed and updated as appropriate: allergies, current medications, past family history, past medical history, past social history, past surgical history and problem list.      REVIEW OF PERTINENT MEDICAL DATA    8/27/19 MRI LUMBAR SPINE WITHOUT CONTRAST 08/27/2019     CLINICAL HISTORY: Low back pain for greater than 6 weeks following  conservative treatment and its persistent, progressive, surgical  candidate, acute right-sided low back pain with pain radiating down  right leg, right-sided radiculopathy.     TECHNIQUE: Sagittal T1, proton-density and fat-suppressed T2-weighted  images were obtained of the lumbar spine, in addition axial T2-weighted  images were obtained from T12 to S2, thin cut axial T1-weighted images  were obtained angled through the interspaces from L3 to S1.     COMPARISON: There are no prior lumbar spine imaging studies from [facility] for comparison.     FINDINGS: Distal thoracic cord and conus is normal in signal intensity,  conus terminates at the L1-L2 interspace level which is normal.     At T11-T12, the disc space  and facets are normal with no canal or  foraminal narrowing.     At T12-L1, disc space is normal. There is mild bilateral facet  overgrowth. There is no central canal or foraminal narrowing.     At L1-L2, the disc space is normal. There is moderate bilateral facet  overgrowth. There is no canal or foraminal narrowing.     At L2-L3, there is moderate bilateral facet overgrowth, minimal disc  desiccation, but the posterior disc margin is normal. Overgrown facets  indent the right and left posterior lateral aspect of the thecal sac,  and there is mild narrowing of the thecal sac. There is no foraminal  narrowing.     At L3-L4, there is moderate-to-severe bilateral facet overgrowth, some  fluid in the left facet joint. There is a 3 mm degenerative  anterolisthesis of L3 and L4, minimal diffuse posterior disc bulge and  there is mild-to-moderate narrowing of the anteroposterior diameter of  the thecal sac, and there is mild bilateral foraminal narrowing.     At L4-L5, there is moderate-to-severe bilateral facet overgrowth, 3 mm  degenerative anterolisthesis of L4 and L5, mild posterior disc bulge,  and there is mild-to-moderate narrowing of the thecal sac. There is  slight narrowing of the right lateral recess with anteromedial right  facet spurs abutting the posterior lateral margin of the traversing  right L5 nerve root. There is essentially no foraminal narrowing.     At L5-S1, there is moderate bilateral facet overgrowth. There is right  posterior lateral to medial foraminal small disc protrusion that abuts  the ventral aspect of the traversing right S1 nerve root, slightly  narrows the right lateral recess. There is no central canal or left  lateral recess or foraminal narrowing.     IMPRESSION:  1. There is small right posterior lateral to medial foraminal disc  protrusion that abuts the ventral aspect of the traversing right S1  nerve root, slightly narrowing the right lateral recess at L5-S1. There  is  mild-to-moderate left and moderate-to-severe right facet overgrowth.  There is no canal or foraminal narrowing at L5-S1.  2. There is moderate-to-severe bilateral facet overgrowth, 3 mm  degenerative anterolisthesis of L3 on L4. There is at least  mild-to-moderate narrowing of the thecal sac and mild bilateral  foraminal narrowing at L3-L4.  3. There is moderate-to-severe bilateral facet overgrowth at L4-L5 with  3 mm degenerative anterolisthesis of L4 on L5, mild diffuse posterior  disc bulge and there is at least mild-to-moderate narrowing of the  thecal sac, and some narrowing of the right lateral recess with  anteromedial right facet spurs abutting the posterior lateral margin of  the traversing right L5 nerve root, but there is no foraminal narrowing  at L4-L5.  4. There is moderate bilateral facet overgrowth at L2-L3, facets and  spurs indent the right and left posterior lateral aspect of the thecal  sac mildly narrowing the thecal sac, but there is no foraminal narrowing  at L2-L3. The remainder of the lumbar spine MRI is unremarkable.     This report was finalized on 8/28/2019 8:34 AM by Dr. Michelet Carmona M.D.    12/14/21 Creatinine 0.88, Platelets 290 (10*3)    Review of Systems   Constitutional: Negative for fatigue.   HENT: Negative for congestion.    Eyes: Negative for visual disturbance.   Respiratory: Negative for shortness of breath.    Cardiovascular: Negative for chest pain.   Gastrointestinal: Negative for constipation and diarrhea.   Genitourinary: Negative for difficulty urinating.   Musculoskeletal: Positive for back pain.   Neurological: Negative for weakness and numbness.   Psychiatric/Behavioral: Positive for sleep disturbance. Negative for suicidal ideas. The patient is not nervous/anxious.      I have reviewed and confirmed the accuracy of the ROS as documented by the MA/LPN/RN Mildred Mirza MD      Vitals:    02/17/22 1433   BP: 100/67   Pulse: 88   Resp: 18   Temp: 96.7 °F (35.9 °C)  "  SpO2: 96%   Weight: 86.4 kg (190 lb 6.4 oz)   Height: 165.1 cm (65\")   PainSc:   2   PainLoc: Back         Objective   Physical Exam  Vitals reviewed.   Constitutional:       General: She is not in acute distress.  Pulmonary:      Effort: Pulmonary effort is normal. No respiratory distress.   Musculoskeletal:      Comments: Ambulation: Without assistive device  Lumbar Exam:  Appearance: Scoliotic curve absent and scarring absent  Able to tandem, toe, and heel walk: Yes  Palpated over lumbosacral paravertebral regions and transverse processes with positive tenderness appreciated, Bilateral.   Sacroiliac joints are not tender, Bilateral.  Trochanteric bursa are tender, Bilateral.  Straight leg raise is negative radiculopathy, Bilateral.  Slump test is negative  radiculopathy, Bilateral.  Facet loading is positive for pain, Bilateral.  Paraspinal/adjacent lumbar musculature are not tender to palpation, Bilateral.  Desmond Erin's test is negative sacroiliac pain, Bilateral.   Skin:     General: Skin is warm and dry.   Neurological:      General: No focal deficit present.      Mental Status: She is alert.      Deep Tendon Reflexes:      Reflex Scores:       Patellar reflexes are 2+ on the right side and 2+ on the left side.     Comments: Negative clonus bilaterally    Psychiatric:         Mood and Affect: Mood normal.         Thought Content: Thought content normal.         Assessment/Plan   Diagnoses and all orders for this visit:    1. Spondylosis of lumbar region without myelopathy or radiculopathy (Primary)  -     Case Request    2. Trochanteric bursitis of both hips    3. Chronic bilateral low back pain without sciatica  -     Case Request        - Pertinent labs reviewed.   - Pertinent imaging reviewed.   - Has failed conservative therapy.    - Will schedule for bilateral ~L2-L5 Medial branch blocks diagnostic x2 (2 weeks apart).  If good results, plan for Radiofrequency ablation (thermal, non-pulsed).  Risks " discussed including but not limited to bleeding, bruising, infection, damage to surrounding structures, headache, and rare things such as being paralyzed, seizure, stroke, heart attack and death.    - Will consider bilateral trochanteric bursa injections in the future if bursitis continues to cause her pain.     --- Follow-up for bilateral ~L2-L5 Medial branch blocks diagnostic x2 (2 weeks apart) and office visit 1-2 weeks after.            While examining this patient, I wore protective equipment including a mask, eye shield and gloves.  I washed my hands before and after this patient encounter.  The patient wore a mask throughout the visit as well.     Mildred Mirza MD  Pain Management

## 2022-02-17 ENCOUNTER — PREP FOR SURGERY (OUTPATIENT)
Dept: SURGERY | Facility: SURGERY CENTER | Age: 72
End: 2022-02-17

## 2022-02-17 ENCOUNTER — OFFICE VISIT (OUTPATIENT)
Dept: PAIN MEDICINE | Facility: CLINIC | Age: 72
End: 2022-02-17

## 2022-02-17 VITALS
RESPIRATION RATE: 18 BRPM | SYSTOLIC BLOOD PRESSURE: 100 MMHG | DIASTOLIC BLOOD PRESSURE: 67 MMHG | HEIGHT: 65 IN | HEART RATE: 88 BPM | BODY MASS INDEX: 31.72 KG/M2 | TEMPERATURE: 96.7 F | WEIGHT: 190.4 LBS | OXYGEN SATURATION: 96 %

## 2022-02-17 DIAGNOSIS — M47.816 SPONDYLOSIS OF LUMBAR REGION WITHOUT MYELOPATHY OR RADICULOPATHY: Primary | ICD-10-CM

## 2022-02-17 DIAGNOSIS — M54.50 CHRONIC BILATERAL LOW BACK PAIN WITHOUT SCIATICA: ICD-10-CM

## 2022-02-17 DIAGNOSIS — M70.62 TROCHANTERIC BURSITIS OF BOTH HIPS: ICD-10-CM

## 2022-02-17 DIAGNOSIS — M70.61 TROCHANTERIC BURSITIS OF BOTH HIPS: ICD-10-CM

## 2022-02-17 DIAGNOSIS — G89.29 CHRONIC BILATERAL LOW BACK PAIN WITHOUT SCIATICA: ICD-10-CM

## 2022-02-17 PROCEDURE — 99204 OFFICE O/P NEW MOD 45 MIN: CPT | Performed by: ANESTHESIOLOGY

## 2022-02-17 RX ORDER — SODIUM CHLORIDE 0.9 % (FLUSH) 0.9 %
10 SYRINGE (ML) INJECTION EVERY 12 HOURS SCHEDULED
Status: CANCELLED | OUTPATIENT
Start: 2022-02-17

## 2022-02-17 RX ORDER — SODIUM CHLORIDE 0.9 % (FLUSH) 0.9 %
10 SYRINGE (ML) INJECTION AS NEEDED
Status: CANCELLED | OUTPATIENT
Start: 2022-02-17

## 2022-02-17 NOTE — PATIENT INSTRUCTIONS
"What to expect if we're setting up an injection/procedure    - I have placed the order today, we'll start speaking to your insurance for authorization (this can sometimes take a few weeks).   - You should be scheduled for your procedure before you leave the office.  If you were not, please call our office to schedule.   - A COVID test may be required for your procedure.  We will let you know if this needs to be scheduled.  - LIGHT Intravenous (IV) sedation is offered for some procedures: you will NOT be put to sleep.  If you plan to have sedation, do not eat or drink anything on the day of your injection.   - Most procedures require having someone drive you.  Please make sure you arrange a  unless told otherwise.   - If you take a blood thinner and you were not instructed whether to continue or hold it, please contact us with any questions.      Radiofrequency ablation (RFA):     - Radiofrequency ablation is a term used to describe cauterization or \"burning.\"   - In pain management, we can use this technique with a special needle to target and destroy areas that are causing your pain.   - In most cases, you must have TWO successful \"test injections\" before you are a candidate for RFA.    After your RFA:   - Because heat is used in this technique, it is common to have soreness after the procedure.  Sometimes \"neuritis\" occurs, which feels like tingling, prickly, or sunburn under the skin sensations.   - Ice packs are helpful in decreasing this soreness and preventing post-procedure \"neuritis\" pain.  Use an ice pack for 20 minutes at a time at least 3 times the day of and the day after your procedure.   - It is common to have arm/leg numbness or weakness the day of your procedure, but this should wear off by the following day.   - It may take up to 6 weeks to gain full benefit from this procedure.  "

## 2022-02-18 ENCOUNTER — TRANSCRIBE ORDERS (OUTPATIENT)
Dept: SURGERY | Facility: SURGERY CENTER | Age: 72
End: 2022-02-18

## 2022-02-18 DIAGNOSIS — M47.816 SPONDYLOSIS OF LUMBAR REGION WITHOUT MYELOPATHY OR RADICULOPATHY: Primary | ICD-10-CM

## 2022-02-18 PROBLEM — M54.50 CHRONIC BILATERAL LOW BACK PAIN WITHOUT SCIATICA: Status: ACTIVE | Noted: 2022-02-18

## 2022-02-18 PROBLEM — G89.29 CHRONIC BILATERAL LOW BACK PAIN WITHOUT SCIATICA: Status: ACTIVE | Noted: 2022-02-18

## 2022-02-21 RX ORDER — LEVOTHYROXINE SODIUM 25 MCG
TABLET ORAL
Qty: 90 TABLET | Refills: 1 | Status: SHIPPED | OUTPATIENT
Start: 2022-02-21 | End: 2022-03-11 | Stop reason: SDUPTHER

## 2022-02-22 ENCOUNTER — PREP FOR SURGERY (OUTPATIENT)
Dept: SURGERY | Facility: SURGERY CENTER | Age: 72
End: 2022-02-22

## 2022-02-22 DIAGNOSIS — M47.816 LUMBAR FACET ARTHROPATHY: ICD-10-CM

## 2022-02-22 DIAGNOSIS — Z86.010 PERSONAL HISTORY OF COLONIC POLYPS: Primary | ICD-10-CM

## 2022-02-22 NOTE — DISCHARGE INSTRUCTIONS
[facility] - Post-procedure Instructions          --  While there are no absolute restrictions, it is recommended that you do not perform strenuous activity today. In the morning, you may resume your level of activity as before your block.    --  If you have a band-aid at your injection site, please remove it later today. Observe the area for any redness, swelling, pus-like drainage, or a temperature over 101°. If any of these symptoms occur, please call your doctor at [phone]. If after office hours, leave a message and the on-call provider will return your call.    --  Ice may be applied to your injection site. It is recommended you avoid direct heat (heating pad; hot tub) for 1-2 days.    --  Call [facility] at [phone] if you experience persistent headache, persistent bleeding from the injection site, or severe pain not relieved by heat or oral medication.    --  Do not make important decisions today.    --  Due to the effects of the block and/or the I.V. Sedation, DO NOT drive or operate hazardous machinery for 12 hours.  Local anesthetics may cause numbness after procedure and precautions must be taken with regards to operating equipment as well as with walking, even if ambulating with assistance of another person or with an assistive device.    --  Do not drink alcohol for 12 hours.    -- You may return to work tomorrow, or as directed by your referring doctor.    --  Occasionally you may notice a slight increase in your pain after the procedure. This should start to improve within the next 24-48 hours. Radiofrequency ablation procedure pain may last 3-4 weeks.    --  It may take as long as 3-4 days before you notice a gradual improvement in your pain and/or other symptoms.    -- You may continue to take your prescribed pain medication as needed.    --  Some normal possible side effects of steroid use could include fluid retention, increased blood sugar, dull headache,  increased sweating, increased appetite, mood swings and flushing.    --  Diabetics are recommended to watch their blood glucose level closely for 24-48 hours after the injection.    --  Must stay in PACU for 20 min upon arrival and prove no leg weakness before being discharged.    --  IN THE EVENT OF A LIFE THREATENING EMERGENCY, (CHEST PAIN, BREATHING DIFFICULTIES, PARALYSIS…) YOU SHOULD GO TO YOUR NEAREST EMERGENCY ROOM.    --  You should be contacted by our office within 2-3 days to schedule follow up or next appointment date.  If not contacted within 7 days, please call the office at [phone]    If you have even 1 hour of relief, these injections are considered successful.

## 2022-02-23 ENCOUNTER — HOSPITAL ENCOUNTER (OUTPATIENT)
Facility: SURGERY CENTER | Age: 72
Setting detail: HOSPITAL OUTPATIENT SURGERY
Discharge: HOME | End: 2022-02-23
Attending: ANESTHESIOLOGY | Admitting: ANESTHESIOLOGY

## 2022-02-23 ENCOUNTER — HOSPITAL ENCOUNTER (OUTPATIENT)
Dept: GENERAL RADIOLOGY | Facility: SURGERY CENTER | Age: 72
Setting detail: HOSPITAL OUTPATIENT SURGERY
End: 2022-02-23

## 2022-02-23 VITALS
DIASTOLIC BLOOD PRESSURE: 85 MMHG | HEART RATE: 66 BPM | OXYGEN SATURATION: 95 % | BODY MASS INDEX: 30.82 KG/M2 | SYSTOLIC BLOOD PRESSURE: 110 MMHG | TEMPERATURE: 98.2 F | HEIGHT: 65 IN | WEIGHT: 185 LBS | RESPIRATION RATE: 16 BRPM

## 2022-02-23 DIAGNOSIS — M47.816 SPONDYLOSIS OF LUMBAR REGION WITHOUT MYELOPATHY OR RADICULOPATHY: ICD-10-CM

## 2022-02-23 PROCEDURE — 77002 NEEDLE LOCALIZATION BY XRAY: CPT

## 2022-02-23 PROCEDURE — 76000 FLUOROSCOPY <1 HR PHYS/QHP: CPT

## 2022-02-23 PROCEDURE — 64493 INJ PARAVERT F JNT L/S 1 LEV: CPT | Performed by: ANESTHESIOLOGY

## 2022-02-23 PROCEDURE — 25010000002 MIDAZOLAM PER 1 MG: Performed by: ANESTHESIOLOGY

## 2022-02-23 PROCEDURE — 64495 INJ PARAVERT F JNT L/S 3 LEV: CPT | Performed by: ANESTHESIOLOGY

## 2022-02-23 PROCEDURE — 64494 INJ PARAVERT F JNT L/S 2 LEV: CPT | Performed by: ANESTHESIOLOGY

## 2022-02-23 RX ORDER — MIDAZOLAM HYDROCHLORIDE 1 MG/ML
INJECTION INTRAMUSCULAR; INTRAVENOUS AS NEEDED
Status: DISCONTINUED | OUTPATIENT
Start: 2022-02-23 | End: 2022-02-23 | Stop reason: HOSPADM

## 2022-02-23 NOTE — OP NOTE
Bilateral L2-L5 Lumbar Medial Branch Blockade  [facility]      PREOPERATIVE DIAGNOSIS:  Lumbar spondylosis without myelopathy    POSTOPERATIVE DIAGNOSIS:  Lumbar spondylosis without myelopathy    PROCEDURE:   Diagnostic Lumbar Medial Branch Nerve Blockades, with fluoroscopy:  at the L2, L3, L4, L5 transverse processes)   1. 97237-82 -- Lumbar Facet blocks, 1st Level  2. 60639-42 -- Lumbar Facet blocks, 2nd  Level  3. 95646-18 -- Lumbar Facet blocks, 3rd Level     PRE-PROCEDURE DISCUSSION WITH PATIENT:    Risks and complications were discussed with the patient prior to starting the procedure and informed consent was obtained.      SURGEON:  Mildred Mirza MD    REASON FOR PROCEDURE:    The patient complains of pain that seems to have a significant axial component    SEDATION:  Versed 2mg IV  ANESTHETIC:  0.25% bupivacaine (0.5% bupivacaine diluted with normal saline)  STEROID:  None  TOTAL VOLUME OF SOLUTION:  8ml    DESCRIPTON OF PROCEDURE:  After obtaining informed consent, IV access was obtained in the preoperative area.   The patient was taken to the operating room.  The patient was placed in the prone position with a pillow under the abdomen. All pressure points were well padded.  EKG, blood pressure, and pulse oximeter were monitored.  The patient was monitored and sedated by the RN under my direction. The lumbosacral area was prepped with Chloraprep and draped in a sterile fashion.     AP fluoroscopic image was used to visualize the junction of the right S1 superior articular process with the sacral ala.  The image was then optimized to maximize visualization of the junctions of the L2, L3, L4, L5 superior articular processes with the transverse processes.  The skin and subcutaneous tissue over the areas was anesthetized with 1% lidocaine.  A 22-gauge spinal needle was then advanced percutaneously through the anesthetized skin tracts under fluoroscopic guidance in a coaxial view to contact  periosteum at the target sites.  After negative aspiration, a volume of 1 mL of the local anesthetic  was injected without resistance at each of the target sites.      The same procedure was then performed on the contralateral side in the exact same fashion.        Onset of analgesia was noted.  Vital signs remained stable throughout.      ESTIMATED BLOOD LOSS:  <5 mL  SPECIMENS:  none    COMPLICATIONS:   No complications were noted.    TOLERANCE & DISCHARGE CONDITION:    The patient tolerated the procedure well.  The patient was transported to the recovery area without difficulties.  The patient was discharged to home under the care of family in stable and satisfactory condition.    Pre-procedure pain score: 3/10 at rest, 5/10 with activity  Post-procedure pain score: 0/10    PLAN OF CARE:  1. The patient was given our standard instruction sheet.  2. We discussed that Lumbar Medial Branch Blockade is a diagnostic procedure in consideration for radiofrequency ablation if two diagnostic procedures prove to be positive for significant benefit.  An alternative plan could be therapeutic lumbar branch blockades.  3. The patient is asked to keep an account of pain relief after the procedure today.  4. The patient will  Return to clinic 1-2 wks.  5. The patient will resume all medications as per the medication reconciliation sheet.

## 2022-02-24 PROBLEM — Z86.010 PERSONAL HISTORY OF COLONIC POLYPS: Status: ACTIVE | Noted: 2022-02-24

## 2022-02-24 PROBLEM — Z86.0100 PERSONAL HISTORY OF COLONIC POLYPS: Status: ACTIVE | Noted: 2022-02-24

## 2022-03-02 ENCOUNTER — HOSPITAL ENCOUNTER (OUTPATIENT)
Dept: GENERAL RADIOLOGY | Facility: SURGERY CENTER | Age: 72
Setting detail: HOSPITAL OUTPATIENT SURGERY
End: 2022-03-02

## 2022-03-02 ENCOUNTER — HOSPITAL ENCOUNTER (OUTPATIENT)
Facility: SURGERY CENTER | Age: 72
Setting detail: HOSPITAL OUTPATIENT SURGERY
Discharge: HOME | End: 2022-03-02
Attending: ANESTHESIOLOGY | Admitting: ANESTHESIOLOGY

## 2022-03-02 VITALS
OXYGEN SATURATION: 96 % | TEMPERATURE: 98.2 F | HEART RATE: 69 BPM | SYSTOLIC BLOOD PRESSURE: 120 MMHG | RESPIRATION RATE: 16 BRPM | DIASTOLIC BLOOD PRESSURE: 74 MMHG

## 2022-03-02 DIAGNOSIS — M47.816 SPONDYLOSIS OF LUMBAR REGION WITHOUT MYELOPATHY OR RADICULOPATHY: ICD-10-CM

## 2022-03-02 PROCEDURE — 64494 INJ PARAVERT F JNT L/S 2 LEV: CPT | Performed by: ANESTHESIOLOGY

## 2022-03-02 PROCEDURE — 64495 INJ PARAVERT F JNT L/S 3 LEV: CPT | Performed by: ANESTHESIOLOGY

## 2022-03-02 PROCEDURE — 64493 INJ PARAVERT F JNT L/S 1 LEV: CPT | Performed by: ANESTHESIOLOGY

## 2022-03-02 PROCEDURE — 76000 FLUOROSCOPY <1 HR PHYS/QHP: CPT

## 2022-03-02 PROCEDURE — 77002 NEEDLE LOCALIZATION BY XRAY: CPT

## 2022-03-02 PROCEDURE — 25010000002 MIDAZOLAM PER 1 MG: Performed by: ANESTHESIOLOGY

## 2022-03-02 RX ORDER — SODIUM CHLORIDE 0.9 % (FLUSH) 0.9 %
10 SYRINGE (ML) INJECTION AS NEEDED
Status: DISCONTINUED | OUTPATIENT
Start: 2022-03-02 | End: 2022-03-02 | Stop reason: HOSPADM

## 2022-03-02 RX ORDER — SODIUM CHLORIDE 0.9 % (FLUSH) 0.9 %
10 SYRINGE (ML) INJECTION EVERY 12 HOURS SCHEDULED
Status: DISCONTINUED | OUTPATIENT
Start: 2022-03-02 | End: 2022-03-02 | Stop reason: HOSPADM

## 2022-03-02 RX ORDER — MIDAZOLAM HYDROCHLORIDE 1 MG/ML
INJECTION INTRAMUSCULAR; INTRAVENOUS AS NEEDED
Status: DISCONTINUED | OUTPATIENT
Start: 2022-03-02 | End: 2022-03-02 | Stop reason: HOSPADM

## 2022-03-02 NOTE — DISCHARGE INSTRUCTIONS
[facility] - Post-procedure Instructions          --  While there are no absolute restrictions, it is recommended that you do not perform strenuous activity today. In the morning, you may resume your level of activity as before your block.    --  If you have a band-aid at your injection site, please remove it later today. Observe the area for any redness, swelling, pus-like drainage, or a temperature over 101°. If any of these symptoms occur, please call your doctor at [phone]. If after office hours, leave a message and the on-call provider will return your call.    --  Ice may be applied to your injection site. It is recommended you avoid direct heat (heating pad; hot tub) for 1-2 days.    --  Call [facility] at [phone] if you experience persistent headache, persistent bleeding from the injection site, or severe pain not relieved by heat or oral medication.    --  Do not make important decisions today.    --  Due to the effects of the block and/or the I.V. Sedation, DO NOT drive or operate hazardous machinery for 12 hours.  Local anesthetics may cause numbness after procedure and precautions must be taken with regards to operating equipment as well as with walking, even if ambulating with assistance of another person or with an assistive device.    --  Do not drink alcohol for 12 hours.    -- You may return to work tomorrow, or as directed by your referring doctor.    --  Occasionally you may notice a slight increase in your pain after the procedure. This should start to improve within the next 24-48 hours. Radiofrequency ablation procedure pain may last 3-4 weeks.    --  It may take as long as 3-4 days before you notice a gradual improvement in your pain and/or other symptoms.    -- You may continue to take your prescribed pain medication as needed.    --  Some normal possible side effects of steroid use could include fluid retention, increased blood sugar, dull headache,  increased sweating, increased appetite, mood swings and flushing.    --  Diabetics are recommended to watch their blood glucose level closely for 24-48 hours after the injection.    --  Must stay in PACU for 20 min upon arrival and prove no leg weakness before being discharged.    --  IN THE EVENT OF A LIFE THREATENING EMERGENCY, (CHEST PAIN, BREATHING DIFFICULTIES, PARALYSIS…) YOU SHOULD GO TO YOUR NEAREST EMERGENCY ROOM.    --  You should be contacted by our office within 2-3 days to schedule follow up or next appointment date.  If not contacted within 7 days, please call the office at [phone]    If you have even 1 hour of relief, these injections are considered successful.

## 2022-03-02 NOTE — OP NOTE
Bilateral L2-X2Fxiaux Medial Branch Blockade  [facility]      PREOPERATIVE DIAGNOSIS:  Lumbar spondylosis without myelopathy    POSTOPERATIVE DIAGNOSIS:  Lumbar spondylosis without myelopathy    PROCEDURE:   Diagnostic Lumbar Medial Branch Nerve Blockades, with fluoroscopy:  at the L2, L3, L4, L5 transverse processes)   1. 87285-56 -- Lumbar Facet blocks, 1st Level  2. 94871-95 -- Lumbar Facet blocks, 2nd  Level  3. 29482-16 -- Lumbar Facet blocks, 3rd Level     PRE-PROCEDURE DISCUSSION WITH PATIENT:    Risks and complications were discussed with the patient prior to starting the procedure and informed consent was obtained.      SURGEON:  Mildred Mirza MD    REASON FOR PROCEDURE:    The patient complains of pain that seems to have a significant axial component and Previous diagnostic positivity of a Lumbar Medial Branch Blockade at the same levels    SEDATION:  Versed 2mg IV  ANESTHETIC:  0.5% bupivacaine  STEROID:  None  TOTAL VOLUME OF SOLUTION:  8ml    DESCRIPTON OF PROCEDURE:  After obtaining informed consent, IV access was obtained in the preoperative area.   The patient was taken to the operating room.  The patient was placed in the prone position with a pillow under the abdomen. All pressure points were well padded.  EKG, blood pressure, and pulse oximeter were monitored.  The patient was monitored and sedated by the RN under my direction. The lumbosacral area was prepped with Chloraprep and draped in a sterile fashion.     AP fluoroscopic image was used to visualize the junction of the right S1 superior articular process with the sacral ala. The image was then optimized to maximize visualization of the junctions of the L2, L3, L4, L5 superior articular processes with the transverse processes.  The skin and subcutaneous tissue over the areas was anesthetized with 1% lidocaine.  A 22-gauge spinal needle was then advanced percutaneously through the anesthetized skin tracts under  fluoroscopic guidance in a coaxial view to contact periosteum at the target sites.  After negative aspiration, a volume of 1 mL of the local anesthetic  was injected without resistance at each of the target sites.      The same procedure was then performed on the contralateral side in the exact same fashion.        Onset of analgesia was noted.  Vital signs remained stable throughout.      ESTIMATED BLOOD LOSS:  <5 mL  SPECIMENS:  none    COMPLICATIONS:   No complications were noted.    TOLERANCE & DISCHARGE CONDITION:    The patient tolerated the procedure well.  The patient was transported to the recovery area without difficulties.  The patient was discharged to home under the care of family in stable and satisfactory condition.    Pre-procedure pain score: 3/10 at rest, 8/10 with activity  Post-procedure pain score: 0/10    PLAN OF CARE:  1. The patient was given our standard instruction sheet.  2. We discussed that Lumbar Medial Branch Blockade is a diagnostic procedure in consideration for radiofrequency ablation if two diagnostic procedures prove to be positive for significant benefit.  An alternative plan could be therapeutic lumbar branch blockades.  3. The patient is asked to keep an account of pain relief after the procedure today.  4. The patient will  Return to clinic 1-2 wks.  5. The patient will resume all medications as per the medication reconciliation sheet.

## 2022-03-07 ENCOUNTER — ANESTHESIA EVENT (OUTPATIENT)
Dept: SURGERY | Facility: SURGERY CENTER | Age: 72
End: 2022-03-07

## 2022-03-07 ENCOUNTER — HOSPITAL ENCOUNTER (OUTPATIENT)
Facility: SURGERY CENTER | Age: 72
Setting detail: HOSPITAL OUTPATIENT SURGERY
Discharge: HOME | End: 2022-03-07
Attending: SURGERY | Admitting: SURGERY

## 2022-03-07 ENCOUNTER — ANESTHESIA (OUTPATIENT)
Dept: SURGERY | Facility: SURGERY CENTER | Age: 72
End: 2022-03-07

## 2022-03-07 VITALS
HEART RATE: 57 BPM | OXYGEN SATURATION: 99 % | WEIGHT: 190.2 LBS | BODY MASS INDEX: 31.65 KG/M2 | RESPIRATION RATE: 17 BRPM | TEMPERATURE: 97.3 F | DIASTOLIC BLOOD PRESSURE: 61 MMHG | SYSTOLIC BLOOD PRESSURE: 98 MMHG

## 2022-03-07 PROCEDURE — S0260 H&P FOR SURGERY: HCPCS | Performed by: SURGERY

## 2022-03-07 PROCEDURE — 25010000002 PROPOFOL 10 MG/ML EMULSION: Performed by: ANESTHESIOLOGY

## 2022-03-07 PROCEDURE — G0105 COLORECTAL SCRN; HI RISK IND: HCPCS | Performed by: SURGERY

## 2022-03-07 RX ORDER — SODIUM CHLORIDE 0.9 % (FLUSH) 0.9 %
10 SYRINGE (ML) INJECTION AS NEEDED
Status: DISCONTINUED | OUTPATIENT
Start: 2022-03-07 | End: 2022-03-07 | Stop reason: HOSPADM

## 2022-03-07 RX ORDER — SODIUM CHLORIDE, SODIUM LACTATE, POTASSIUM CHLORIDE, CALCIUM CHLORIDE 600; 310; 30; 20 MG/100ML; MG/100ML; MG/100ML; MG/100ML
1000 INJECTION, SOLUTION INTRAVENOUS CONTINUOUS
Status: DISCONTINUED | OUTPATIENT
Start: 2022-03-07 | End: 2022-03-07 | Stop reason: HOSPADM

## 2022-03-07 RX ORDER — PROPOFOL 10 MG/ML
VIAL (ML) INTRAVENOUS AS NEEDED
Status: DISCONTINUED | OUTPATIENT
Start: 2022-03-07 | End: 2022-03-07 | Stop reason: SURG

## 2022-03-07 RX ORDER — LIDOCAINE HYDROCHLORIDE 10 MG/ML
0.5 INJECTION, SOLUTION INFILTRATION; PERINEURAL ONCE AS NEEDED
Status: DISCONTINUED | OUTPATIENT
Start: 2022-03-07 | End: 2022-03-07 | Stop reason: HOSPADM

## 2022-03-07 RX ORDER — LIDOCAINE HYDROCHLORIDE 20 MG/ML
INJECTION, SOLUTION INFILTRATION; PERINEURAL AS NEEDED
Status: DISCONTINUED | OUTPATIENT
Start: 2022-03-07 | End: 2022-03-07 | Stop reason: SURG

## 2022-03-07 RX ADMIN — LIDOCAINE HYDROCHLORIDE 100 MG: 20 INJECTION, SOLUTION INFILTRATION; PERINEURAL at 12:46

## 2022-03-07 RX ADMIN — PROPOFOL 100 MG: 10 INJECTION, EMULSION INTRAVENOUS at 12:46

## 2022-03-07 RX ADMIN — SODIUM CHLORIDE, POTASSIUM CHLORIDE, SODIUM LACTATE AND CALCIUM CHLORIDE 1000 ML: 600; 310; 30; 20 INJECTION, SOLUTION INTRAVENOUS at 12:21

## 2022-03-07 RX ADMIN — PROPOFOL INJECTABLE EMULSION 100 MCG/KG/MIN: 10 INJECTION, EMULSION INTRAVENOUS at 12:46

## 2022-03-07 RX ADMIN — SODIUM CHLORIDE, SODIUM LACTATE, POTASSIUM CHLORIDE, AND CALCIUM CHLORIDE: .6; .31; .03; .02 INJECTION, SOLUTION INTRAVENOUS at 12:46

## 2022-03-07 NOTE — DISCHARGE INSTRUCTIONS
Monitored Anesthesia Care, Care After  These instructions provide you with information about caring for yourself after your procedure. Your health care provider may also give you more specific instructions. Your treatment has been planned according to current medical practices, but problems sometimes occur. Call your health care provider if you have any problems or questions after your procedure.  What can I expect after the procedure?  After your procedure, you may:  Feel sleepy for several hours.  Feel clumsy and have poor balance for several hours.  Feel forgetful about what happened after the procedure.  Have poor judgment for several hours.  Feel nauseous or vomit.  Have a sore throat if you had a breathing tube during the procedure.  Follow these instructions at home:    *****************NO DRIVING TODAY****************************    For at least 24 hours after the procedure:             Have a responsible adult stay with you. It is important to have someone help care for you until you are awake and alert.  Rest as needed.  Do not:  Participate in activities in which you could fall or become injured.  Use heavy machinery.  Drink alcohol.  Take sleeping pills or medicines that cause drowsiness.  Make important decisions or sign legal documents.  Take care of children on your own.  Eating and drinking  Follow the diet that is recommended by your health care provider.  If you vomit, drink water, juice, or soup when you can drink without vomiting.  Make sure you have little or no nausea before eating solid foods.  General instructions  Take over-the-counter and prescription medicines only as told by your health care provider.  If you have sleep apnea, surgery and certain medicines can increase your risk for breathing problems. Follow instructions from your health care provider about wearing your sleep device:  Anytime you are sleeping, including during daytime naps.  While taking prescription pain medicines,  sleeping medicines, or medicines that make you drowsy.  If you smoke, do not smoke without supervision.  Keep all follow-up visits as told by your health care provider. This is important.  Contact a health care provider if:  You keep feeling nauseous or you keep vomiting.  You feel light-headed.  You develop a rash.  You have a fever.  Get help right away if:  You have trouble breathing.  Summary  For several hours after your procedure, you may feel sleepy and have poor judgment.  Have a responsible adult stay with you for at least 24 hours or until you are awake and alert.  This information is not intended to replace advice given to you by your health care provider. Make sure you discuss any questions you have with your health care provider.  Document Released: 04/09/2017 Document Revised: 08/03/2018 Document Reviewed: 04/09/2017  [employer] Interactive Patient Education © 2019 [employer]    Colonoscopy, Adult, Care After  This sheet gives you information about how to care for yourself after your procedure. Your doctor may also give you more specific instructions. If you have problems or questions, call your doctor.  What can I expect after the procedure?  After the procedure, it is common to have:  A small amount of blood in your poop for 24 hours.  Some gas.  Mild cramping or bloating in your belly.  Follow these instructions at home:  General instructions    ***************DO NOT DRIVE TODAY*******************    For the first 24 hours after the procedure:  Do not sign important documents.  Do not drink alcohol.  Do your daily activities more slowly than normal.  Eat foods that are soft and easy to digest.  Take over-the-counter or prescription medicines only as told by your doctor.  To help cramping and bloating:       Try walking around.  Put heat on your belly (abdomen) as told by your doctor. Use a heat source that your doctor recommends, such as a moist heat pack or a heating pad.  Put a towel between your  skin and the heat source.  Leave the heat on for 20-30 minutes.  Remove the heat if your skin turns bright red. This is especially important if you cannot feel pain, heat, or cold. You can get burned.  Eating and drinking   Repeat colonoscopy in 5 years.  Please give patient information on diverticulosis.

## 2022-03-07 NOTE — H&P
Date: 3/7/2022     Patient: Dian Jefferson    : 1950   [id number]     CC:  Screening Colonoscopy, with personal and family history of colon polyps    History:   The patient is a 72 y.o. female of Mallika Stewart MD  who presents to discuss screening colonoscopy with personal and family history of colon polyps. The patient currently has no complaints.  Patient denies any history of nausea, abdominal pain, weight loss, change in bowel habits or rectal bleeding.  Patient denies melena, hematochezia or BRBPR.  No family history of ulcerative colitis, Crohn's disease or familial polyposis.    The following portions of the patient's history were reviewed and updated as appropriate: allergies, current medications, past family history, past medical history, past social history, past surgical history and problem list.    Past Medical History:   Diagnosis Date   • Anxiety    • Colon polyp    • GERD (gastroesophageal reflux disease)    • HLD (hyperlipidemia)    • Hot flash, menopausal    • HTN (hypertension)    • Hypothyroidism    • Mammogram abnormal    • Myalgia    • Vitamin D deficiency       Past Surgical History:   Procedure Laterality Date   • BREAST SURGERY  1980   • CATARACT EXTRACTION, BILATERAL Bilateral 2021, 3/2021   • COLONOSCOPY     • MEDIAL BRANCH BLOCK Bilateral 2022    Procedure: LUMBAR MEDIAL BRANCH BLOCK Bilateral ~L2-L5 x2 (2 weeks apart);  Surgeon: Mildred Mirza MD;  Location: [facility] MAIN OR;  Service: Pain Management;  Laterality: Bilateral;   • MEDIAL BRANCH BLOCK Bilateral 3/2/2022    Procedure: LUMBAR MEDIAL BRANCH BLOCK Bilateral ~L2-L5 x2 (2 weeks apart);  Surgeon: Mildred Mirza MD;  Location: [facility] MAIN OR;  Service: Pain Management;  Laterality: Bilateral;   • TONSILLECTOMY     • TUBAL ABDOMINAL LIGATION       Medications:   Medications Prior to Admission   Medication Sig Dispense Refill Last Dose   • baclofen (LIORESAL) 10 MG tablet Take 1 tablet by mouth 3 (Three)  Times a Day. 30 tablet 0 Past Month at Unknown time   • cetirizine (ZyrTEC) 10 MG tablet Take 1 tablet by mouth daily.   3/6/2022 at Unknown time   • Cholecalciferol (VITAMIN D3) 2000 UNITS capsule Take 1 capsule by mouth daily.   3/6/2022 at Unknown time   • lisinopril (PRINIVIL,ZESTRIL) 20 MG tablet Take 1 tablet by mouth Daily. 90 tablet 3 3/6/2022 at Unknown time   • meloxicam (Mobic) 7.5 MG tablet 1-2 tabs daily as needed 30 tablet 2 Past Month at Unknown time   • Multiple Vitamins-Minerals (Airborne Gummies) chewable tablet Chew.   3/6/2022 at Unknown time   • rosuvastatin (CRESTOR) 40 MG tablet Take 1 tablet by mouth Every Night. 90 tablet 3 3/6/2022 at Unknown time   • Synthroid 25 MCG tablet TAKE 1 TABLET BY MOUTH EVERY DAY 90 tablet 1 3/6/2022 at Unknown time   • vilazodone (Viibryd) 40 MG tablet tablet Take 1 tablet by mouth Daily. 90 tablet 1 3/6/2022 at Unknown time   • YUVAFEM 10 MCG tablet vaginal tablet Insert 1 tablet into the vagina 2 (Two) Times a Week. 24 tablet 1 More than a month at Unknown time     Allergies: Patient has no known allergies.   Social History:  Social History     Socioeconomic History   • Marital status:      Spouse name: Modesto Jefferson   • Number of children: 2   Tobacco Use   • Smoking status: Never Smoker   • Smokeless tobacco: Never Used   Vaping Use   • Vaping Use: Never used   Substance and Sexual Activity   • Alcohol use: Yes     Comment: OCC. USE   • Drug use: No   • Sexual activity: Yes     Partners: Male      Family History   Problem Relation Age of Onset   • Depression Mother    • Breast cancer Mother         MASECTOMY   • Dementia Mother    • Heart attack Father    • Hypertension Father    • Breast cancer Maternal Aunt    • Hypertension Sister    • Colon polyps Sister    • Hypertension Sister         Review of Systems:   General: Patient reports good health  Eyes: No eye problems  Ears, nose, mouth and throat: No rhinitis, no hearing problems, no chronic  cough  Cardiovascular/heart: Denies palpitations, syncope or chest pain  Respiratory/lung: Denies shortness of breath, hemoptysis, or dyspnea on exertion   Genital/urinary: No frequency, hematuria or dysuria  Hematological/lymphatic: Denies anemia or other problems  Musculoskeletal: No joint pain, no defects  Skin: No psoriasis or other skin issues  Neurological: No seizures or other neurological problems  Psychiatric: None  Endocrine: Negative  Gastro-intestinal: No constipation, no diarrhea, no melena, no hematochezia  There were no vitals taken for this visit.    Physical Examination:  General: Alert and oriented x3 in no acute distress  HEENT: Normal cephalic, atraumatic, PERRLA, EOMI, sclera anicteric, moist mucous membranes, neck is supple, no JVD, no carotid bruits, no thyromegaly no adenopathy  Chest: CTA and percussion  CVA: RRR, normal S1-S2, no murmurs, no gallops or rubs  Abdomen: Positive BS, soft, nondistended, nontender, no rebound, no guarding, no hernias, no organomegaly and no masses  Extremities: Full range of motion, no clubbing, no cyanosis or edema  Neurovascular: Grossly intact  Debilities: none  Emotional behavior: appropriate     Impression:  72 y.o. female for screening colonoscopy with personal and family history of colon polyps.    Plan:  Patient is presenting for screening colonoscopy with personal and family history of colon polyps.  I have recommended that the patient undergo a screening colonoscopy in accordance of American Cancer Society's guidelines.  I have discussed this procedure in detail with the patient.  I have discussed the risks, benefits and alternatives.  I have discussed the risk of anesthesia, bleeding and perforation.  Patient understands these risks, benefits and alternatives and wishes to proceed.      Lorraine Alba MD  General, Minimally Invasive and Endoscopic Surgery  [facility]    [address] [address]     [address]  [place] [address]               [place] [address]    P: [phone]  F: [phone]    Cc:  Mallika Stewart MD

## 2022-03-07 NOTE — EXTERNAL PATIENT INSTRUCTIONS
Patient Education   Table of Contents       Diverticulosis     To view videos and all your education online visit,   [email or web address]   or scan this QR code with your smartphone.                  Diverticulosis        Diverticulosis is a condition that develops when small pouches (diverticula) form in the wall of the large intestine (colon). The colon is where water is absorbed and stool (feces) is formed. The pouches form when the inside layer of the colon pushes through weak spots in the outer layers of the colon. You may have a few pouches or many of them.   The pouches usually do not cause problems unless they become inflamed or infected. When this happens, the condition is called diverticulitis.     What are the causes?   The cause of this condition is not known.   What increases the risk?    The following factors may make you more likely to develop this condition:       Being older than age 60. Your risk for this condition increases with age. Diverticulosis is rare among people younger than age 30. By age 80, many people have it.       Eating a low-fiber diet.       Having frequent constipation.       Being overweight.       Not getting enough exercise.       Smoking.       Taking over-the-counter pain medicines, like aspirin and ibuprofen.       Having a family history of diverticulosis.     What are the signs or symptoms?    In most people, there are no symptoms of this condition. If you do have symptoms, they may include:       Bloating.       Cramps in the abdomen.       Constipation or diarrhea.       Pain in the lower left side of the abdomen.     How is this diagnosed?    Because diverticulosis usually has no symptoms, it is most often diagnosed during an exam for other colon problems. The condition may be diagnosed by:       Using a flexible scope to examine the colon (colonoscopy).       Taking an X-ray of the colon after dye has been put into the colon (barium enema).       Having a CT  scan.     How is this treated?       You may not need treatment for this condition. Your health care provider may recommend treatment to prevent problems. You may need treatment if you have symptoms or if you previously had diverticulitis. Treatment may include:       Eating a high-fiber diet.       Taking a fiber supplement.       Taking a live bacteria supplement (probiotic).       Taking medicine to relax your colon.       Follow these instructions at home:   Medicines         Take over-the-counter and prescription medicines only as told by your health care provider.       If told by your health care provider, take a fiber supplement or probiotic.     Constipation prevention       Your condition may cause constipation. To prevent or treat constipation, you may need to:       Drink enough fluid to keep your urine pale yellow.       Take over-the-counter or prescription medicines.       Eat foods that are high in fiber, such as beans, whole grains, and fresh fruits and vegetables.       Limit foods that are high in fat and processed sugars, such as fried or sweet foods.     General instructions         Try not to strain when you have a bowel movement.       Keep all follow-up visits as told by your health care provider. This is important.       Contact a health care provider if you:         Have pain in your abdomen.       Have bloating.       Have cramps.       Have not had a bowel movement in 3 days.     Get help right away if:         Your pain gets worse.       Your bloating becomes very bad.       You have a fever or chills, and your symptoms suddenly get worse.       You vomit.       You have bowel movements that are bloody or black.       You have bleeding from your rectum.     Summary         Diverticulosis is a condition that develops when small pouches (diverticula) form in the wall of the large intestine (colon).       You may have a few pouches or many of them.       This condition is most often  diagnosed during an exam for other colon problems.       Treatment may include increasing the fiber in your diet, taking supplements, or taking medicines.     This information is not intended to replace advice given to you by your health care provider. Make sure you discuss any questions you have with your health care provider.     Document Released: 09/14/2005Document Revised: 07/16/2020Document Reviewed: 07/16/2020     [employer] Patient Education ? 2021 [employer]

## 2022-03-07 NOTE — OP NOTE
Operative Note:    Pre-op dx: Screening Colonoscopy, Personal history of polyps     Post-op dx: diverticulosis     Procedure: Colonoscopy    Surgeon: Lorraine Alba MD    Anesthesia: MAC    EBL: none    Specimen:  * No orders in the log *    Complications: none    Procedure:    Colonoscopy:  A digital rectal examination was performed which revealed no rectal masses.  Scope was introduced into the rectum and advanced into the sigmoid, descending colon, splenic flexure, transverse colon, hepatic flexure, ascending colon and into the cecum.  Cecum was identified by the ileocecal valve and appendiceal orifice.  Patient had diverticulosis throughout the sigmoid and descending colon.  There was no evidence of any polyps, masses, AV malformation or inflammation.  The bowel preparation was excellent. The scope was slowly withdrawn and a second look was performed.  Upon the second look, there were no new findings.  The colon was desufflated and the procedure was terminated.   Patient was transferred to recovery room in stable condition.      Assessment/Plan:  Repeat colonoscopy in 5 years.  Personal history of polyps  Diverticulosis    Lorraine Alba MD  General, Minimally Invasive and Endoscopic Surgery  [facility] [address] [address] [facility] [address] [place] [address]   [address]    [address]  [place] [address]               [place] [address]    P: [phone]  F: [phone]    Cc:  Mallika Stewart MD

## 2022-03-07 NOTE — ANESTHESIA POSTPROCEDURE EVALUATION
Patient: Dian Jefferson    Procedure Summary     Date: 03/07/22 Room / Location: [facility] OR [facility]    Anesthesia Start: 1246 Anesthesia Stop: 1313    Procedure: COLONOSCOPY (N/A ) Diagnosis:       Personal history of colonic polyps      (Personal history of colonic polyps [Z86.010])    Surgeons: Lorraine Alba MD Provider: Sanjiv Bashir MD    Anesthesia Type: MAC ASA Status: 2          Anesthesia Type: MAC    Vitals  Vitals Value Taken Time   BP     Temp     Pulse 65 03/07/22 1313   Resp     SpO2 98 % 03/07/22 1313   Vitals shown include unvalidated device data.        Post Anesthesia Care and Evaluation    Patient location during evaluation: PHASE II  Anesthetic complications: No anesthetic complications

## 2022-03-07 NOTE — ANESTHESIA PREPROCEDURE EVALUATION
Anesthesia Evaluation     Nursing notes reviewed   no history of anesthetic complications:               Airway   Mallampati: II  TM distance: >3 FB  Neck ROM: full  Dental      Pulmonary - normal exam   (-) not a smoker  Cardiovascular - normal exam    (+) hypertension,   (-) angina      Neuro/Psych  (+) psychiatric history Anxiety,    GI/Hepatic/Renal/Endo    (+) obesity,   thyroid problem hypothyroidism    ROS Comment: P/H Colion Polyp    Musculoskeletal     Abdominal    Substance History      OB/GYN          Other                        Anesthesia Plan    ASA 2     MAC       Anesthetic plan, all risks, benefits, and alternatives have been provided, discussed and informed consent has been obtained with: patient.        CODE STATUS:

## 2022-03-07 NOTE — EXTERNAL PATIENT INSTRUCTIONS
Patient Education   Table of Contents       Diverticulosis     To view videos and all your education online visit,   [email or web address]   or scan this QR code with your smartphone.                  Diverticulosis        Diverticulosis is a condition that develops when small pouches (diverticula) form in the wall of the large intestine (colon). The colon is where water is absorbed and stool (feces) is formed. The pouches form when the inside layer of the colon pushes through weak spots in the outer layers of the colon. You may have a few pouches or many of them.   The pouches usually do not cause problems unless they become inflamed or infected. When this happens, the condition is called diverticulitis.     What are the causes?   The cause of this condition is not known.   What increases the risk?    The following factors may make you more likely to develop this condition:       Being older than age 60. Your risk for this condition increases with age. Diverticulosis is rare among people younger than age 30. By age 80, many people have it.       Eating a low-fiber diet.       Having frequent constipation.       Being overweight.       Not getting enough exercise.       Smoking.       Taking over-the-counter pain medicines, like aspirin and ibuprofen.       Having a family history of diverticulosis.     What are the signs or symptoms?    In most people, there are no symptoms of this condition. If you do have symptoms, they may include:       Bloating.       Cramps in the abdomen.       Constipation or diarrhea.       Pain in the lower left side of the abdomen.     How is this diagnosed?    Because diverticulosis usually has no symptoms, it is most often diagnosed during an exam for other colon problems. The condition may be diagnosed by:       Using a flexible scope to examine the colon (colonoscopy).       Taking an X-ray of the colon after dye has been put into the colon (barium enema).       Having a CT  scan.     How is this treated?       You may not need treatment for this condition. Your health care provider may recommend treatment to prevent problems. You may need treatment if you have symptoms or if you previously had diverticulitis. Treatment may include:       Eating a high-fiber diet.       Taking a fiber supplement.       Taking a live bacteria supplement (probiotic).       Taking medicine to relax your colon.       Follow these instructions at home:   Medicines         Take over-the-counter and prescription medicines only as told by your health care provider.       If told by your health care provider, take a fiber supplement or probiotic.     Constipation prevention       Your condition may cause constipation. To prevent or treat constipation, you may need to:       Drink enough fluid to keep your urine pale yellow.       Take over-the-counter or prescription medicines.       Eat foods that are high in fiber, such as beans, whole grains, and fresh fruits and vegetables.       Limit foods that are high in fat and processed sugars, such as fried or sweet foods.     General instructions         Try not to strain when you have a bowel movement.       Keep all follow-up visits as told by your health care provider. This is important.       Contact a health care provider if you:         Have pain in your abdomen.       Have bloating.       Have cramps.       Have not had a bowel movement in 3 days.     Get help right away if:         Your pain gets worse.       Your bloating becomes very bad.       You have a fever or chills, and your symptoms suddenly get worse.       You vomit.       You have bowel movements that are bloody or black.       You have bleeding from your rectum.     Summary         Diverticulosis is a condition that develops when small pouches (diverticula) form in the wall of the large intestine (colon).       You may have a few pouches or many of them.       This condition is most often  diagnosed during an exam for other colon problems.       Treatment may include increasing the fiber in your diet, taking supplements, or taking medicines.     This information is not intended to replace advice given to you by your health care provider. Make sure you discuss any questions you have with your health care provider.     Document Released: 09/14/2005Document Revised: 07/16/2020Document Reviewed: 07/16/2020     [employer] Patient Education ? 2021 [employer]

## 2022-03-09 ENCOUNTER — TELEPHONE (OUTPATIENT)
Dept: SURGERY | Facility: CLINIC | Age: 72
End: 2022-03-09

## 2022-03-09 ENCOUNTER — TRANSCRIBE ORDERS (OUTPATIENT)
Dept: SURGERY | Facility: SURGERY CENTER | Age: 72
End: 2022-03-09

## 2022-03-09 ENCOUNTER — OFFICE VISIT (OUTPATIENT)
Dept: PAIN MEDICINE | Facility: CLINIC | Age: 72
End: 2022-03-09

## 2022-03-09 VITALS
HEIGHT: 65 IN | OXYGEN SATURATION: 98 % | BODY MASS INDEX: 32.15 KG/M2 | DIASTOLIC BLOOD PRESSURE: 77 MMHG | TEMPERATURE: 96.4 F | SYSTOLIC BLOOD PRESSURE: 109 MMHG | WEIGHT: 193 LBS | RESPIRATION RATE: 18 BRPM | HEART RATE: 76 BPM

## 2022-03-09 DIAGNOSIS — G89.29 CHRONIC BILATERAL LOW BACK PAIN WITHOUT SCIATICA: Primary | ICD-10-CM

## 2022-03-09 DIAGNOSIS — M47.816 LUMBAR FACET ARTHROPATHY: ICD-10-CM

## 2022-03-09 DIAGNOSIS — Z41.9 SURGERY, ELECTIVE: Primary | ICD-10-CM

## 2022-03-09 DIAGNOSIS — M54.50 CHRONIC BILATERAL LOW BACK PAIN WITHOUT SCIATICA: Primary | ICD-10-CM

## 2022-03-09 PROCEDURE — 99214 OFFICE O/P EST MOD 30 MIN: CPT | Performed by: NURSE PRACTITIONER

## 2022-03-09 RX ORDER — SODIUM CHLORIDE 0.9 % (FLUSH) 0.9 %
10 SYRINGE (ML) INJECTION AS NEEDED
Status: CANCELLED | OUTPATIENT
Start: 2022-03-09

## 2022-03-09 RX ORDER — SODIUM CHLORIDE 0.9 % (FLUSH) 0.9 %
10 SYRINGE (ML) INJECTION EVERY 12 HOURS SCHEDULED
Status: CANCELLED | OUTPATIENT
Start: 2022-03-09

## 2022-03-09 NOTE — TELEPHONE ENCOUNTER
Spoke to pt and let her know that her colonoscopy showed no polyps or rectal masses and that her colonoscopy was ok and that we would contact her in 5 years when it was time to do another one. Pt understood

## 2022-03-09 NOTE — PROGRESS NOTES
CHIEF COMPLAINT  Follow-up for back pain.    Subjective   Dian Jefferson is a 72 y.o. female  who presents to the office for follow-up of procedure.  She completed a Bilateral L2-L5 Lumbar Medial Branch Blockade on 2/23/2022 and 3/2/2022 performed by Dr. Mirza for management of back pain. Patient reports at least 80% diagnostic relief from the procedure.      Patient remained masked during entire encounter. No cough present. I donned a mask and eye protection throughout entire visit. Prior to donning mask and eye protection, hand hygiene was performed, as well as when it was doffed.  I was closer than 6 feet, but not for an extended period of time. No obvious exposure to any bodily fluids.    Back Pain  This is a chronic problem. The current episode started more than 1 month ago. The problem occurs daily. The pain is present in the lumbar spine. The quality of the pain is described as aching and burning. The pain does not radiate. The pain is at a severity of 4/10. The symptoms are aggravated by standing (walking). Associated symptoms include weakness. Pertinent negatives include no chest pain or numbness. She has tried analgesics (rest, laying down flat) for the symptoms. The treatment provided mild relief.        PEG Assessment   What number best describes your pain on average in the past week?5  What number best describes how, during the past week, pain has interfered with your enjoyment of life?6  What number best describes how, during the past week, pain has interfered with your general activity?  6    Review of Pertinent Medical Data ---  MRI LUMBAR [facility]       The following portions of the patient's history were reviewed and updated as appropriate: allergies, current medications, past family history, past medical history, past social history, past surgical history and problem list.    Review of Systems   Constitutional: Negative for fatigue.   HENT: Negative for congestion.    Eyes: Negative for  "visual disturbance.   Respiratory: Negative for shortness of breath.    Cardiovascular: Negative for chest pain.   Gastrointestinal: Negative for constipation and diarrhea.   Genitourinary: Negative for difficulty urinating.   Musculoskeletal: Positive for back pain.   Neurological: Positive for weakness. Negative for numbness.   Psychiatric/Behavioral: Negative for sleep disturbance and suicidal ideas. The patient is not nervous/anxious.      I have reviewed and confirmed the accuracy of the ROS as documented by the MA/LPN/RN LIZABETH Rico     Vitals:    03/09/22 1453   BP: 109/77   Pulse: 76   Resp: 18   Temp: 96.4 °F (35.8 °C)   SpO2: 98%   Weight: 87.5 kg (193 lb)   Height: 165.1 cm (65\")   PainSc:   4   PainLoc: Back         Objective   Physical Exam  Vitals and nursing note reviewed.   Constitutional:       General: She is not in acute distress.     Appearance: Normal appearance. She is not ill-appearing.   Pulmonary:      Effort: Pulmonary effort is normal. No respiratory distress.   Musculoskeletal:      Lumbar back: Tenderness and bony tenderness present.      Comments: +lumbar facet tenderness/loading    Skin:     General: Skin is warm and dry.   Neurological:      Mental Status: She is alert and oriented to person, place, and time.      Motor: Motor function is intact.      Gait: Gait normal.   Psychiatric:         Mood and Affect: Mood normal.         Behavior: Behavior normal.             Assessment/Plan   Diagnoses and all orders for this visit:    1. Chronic bilateral low back pain without sciatica (Primary)    2. Lumbar facet arthropathy        --- Bilateral L2-L5 RFA    --------  Education about Radiofrequency Lesioning of Medial Branches:    The medial branch blockade was intended for diagnostic purposes, with the intent of offering the patient Radiofrequency thermal rhizotomy if the MBB is diagnostically effective.  The diagnostic blockade is necessary to determine the likelihood that RF " therapy could be efficacious in providing long term relief to the patient.  As indicated above, diagnostic efficacy was established.      In the RF procedure, needles are placed to the joint lines in the same fashion, and after testing, the needle tips are heated to thermally treat the nerves, blocking the nerves by in essence damaging the nerves with the heat treatment.      Medically, a successful RF procedure should provide a patient with 50% pain relief or more for at least 6 months.  We estimate a likelihood of about an 80% chance that medical success will be realized.  We discussed & educated once again that not all patients have a medically successful result, and the patient voices understanding.  However, our clinical experience suggests that successful patients receive relief more in the range of 12 months on average.  (We also discussed that a fortunate minority of patients receive therapeutic success from the MBB, and may not require RF ablation.  If a patient receives more than 8 weeks of relief from MBB, then occasional repeat MBB for therapeutic purposes is a very reasonable alternative therapy.  This course of therapy is consistent with our LCDs according to our CMS  in the area, and therefore other insurance providers should follow accordingly.  We will monitor our patients to screen for these therapeutic responders and will offer RF therapy only when necessary.  However, in this clinical scenario, this therapeutic result was not realized, and therefore Radiofrequency Lesioning is medically necessary.)      We discussed that MBB & RF are not without risks.  Guidelines regarding anticoagulant use & neuraxial procedures will be respected.  Patients that are ill or otherwise may be at risk for sepsis will not have their spines accessed by neuraxial injections of any type.  This patient will not be offered these therapies if there is an increased risk.   We discussed that there is a risk of  postprocedural pain and also a risk of worsening of clinical picture with these procedures as with any neuraxial procedure.  All invasive procedures have risks including but not limited to bleeding, infection, injury, nerve injury, paralysis, coma, death, lack of pain relief, and worsening of clinical picture.      In this education session, all of these topics were covered and the patient voiced understanding.    ---------    --- Follow-up for procedure          As the clinician, I personally reviewed the NASH from 3/9/2022 while the patient was in the office today.      This document is intended for medical expert use only. Reading of this document by patients and/or patient's family without participating medical staff guidance may result in misinterpretation and unintended morbidity.   Any interpretation of such data is the responsibility of the patient and/or family member responsible for the patient in concert with their primary or specialist providers, not to be left for sources of online searches such as [employer], [employer] or similar queries. Relying on these approaches to knowledge may result in misinterpretation, misguided goals of care and even death should patients or family members try recommendations outside of the realm of professional medical care in a supervised way.

## 2022-03-11 RX ORDER — LEVOTHYROXINE SODIUM 25 MCG
25 TABLET ORAL DAILY
Qty: 90 TABLET | Refills: 1 | Status: SHIPPED | OUTPATIENT
Start: 2022-03-11 | End: 2022-07-01 | Stop reason: SDUPTHER

## 2022-03-11 NOTE — TELEPHONE ENCOUNTER
Medication requested (name and dose): Synthroid 25 MCG tablet    Pharmacy where request should be sent: [facility] [address] [place] [address], [place] - [address] - [phone]  - [phone] FX    Additional details provided by patient: PT HAS UNTIL Tuesday BUT WOULD LIKE MEDICATION TO BE CALLED IN TODAY AND STATED THAT TO LET PHARMACY KNOW SHE ONLY TAKES SYNTHROID AND NOT GENERIC. ASKING FOR A 90 DAY      Best call back number: [phone]    Does the patient have less than a 3 day supply:  [] Yes  [x] No    Carlie Thrasher  03/11/22, 13:17 EST

## 2022-03-24 ENCOUNTER — TELEMEDICINE (OUTPATIENT)
Dept: INTERNAL MEDICINE | Facility: CLINIC | Age: 72
End: 2022-03-24

## 2022-03-24 VITALS — TEMPERATURE: 97 F

## 2022-03-24 DIAGNOSIS — J30.9 ALLERGIC RHINITIS, UNSPECIFIED SEASONALITY, UNSPECIFIED TRIGGER: Primary | ICD-10-CM

## 2022-03-24 PROBLEM — IMO0002 NUCLEAR CATARACT: Status: ACTIVE | Noted: 2021-02-05

## 2022-03-24 PROCEDURE — 99213 OFFICE O/P EST LOW 20 MIN: CPT | Performed by: NURSE PRACTITIONER

## 2022-03-24 RX ORDER — MONTELUKAST SODIUM 10 MG/1
10 TABLET ORAL NIGHTLY
Qty: 30 TABLET | Refills: 3 | Status: SHIPPED | OUTPATIENT
Start: 2022-03-24 | End: 2023-03-07 | Stop reason: SDUPTHER

## 2022-03-24 NOTE — PROGRESS NOTES
Subjective   Dian Jefferson is a 72 y.o. female.     History of Present Illness    The patient is here today with c/o runny nose, sinus pressure worse on the left side starting yesterday. Sat up in bed to sleep. Now stopped up and runny nose and sneezing. 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 She was outside the other day.   She is taking her zyrtec, airborne and emergen C.   The following portions of the patient's history were reviewed and updated as appropriate: allergies, current medications, past family history, past medical history, past social history, past surgical history and problem list.    Review of Systems   Constitutional: Negative for chills and fever.   HENT: Positive for postnasal drip, rhinorrhea, sinus pressure and sneezing. Negative for ear discharge and sore throat.    Respiratory: Positive for cough (a little bit). Negative for shortness of breath and wheezing.    Cardiovascular: Negative.        Objective   Physical Exam  Constitutional:       Appearance: Normal appearance.   HENT:      Nose:      Right Sinus: No maxillary sinus tenderness or frontal sinus tenderness.      Left Sinus: Maxillary sinus tenderness and frontal sinus tenderness present.      Comments: Per patient  Pulmonary:      Effort: Pulmonary effort is normal.   Neurological:      General: No focal deficit present.      Mental Status: She is alert.   Psychiatric:         Mood and Affect: Mood normal.         Behavior: Behavior normal.         Thought Content: Thought content normal.         Judgment: Judgment normal.         Vitals:    03/24/22 1603   Temp: 97 °F (36.1 °C)     There is no height or weight on file to calculate BMI.      Assessment/Plan   Diagnoses and all orders for this visit:    1. Allergic rhinitis, unspecified seasonality, unspecified trigger (Primary)  -     montelukast (Singulair) 10 MG tablet; Take 1 tablet by mouth Every Night.   Dispense: 30 tablet; Refill: 3        Visit via video, pt consents, via doximity, visit lasted          1. AR- continue zyrtec, airborne, start mucinex 12 hr, no sudafed containing products, NSS and montelukast (stop for irritability) notify if continued or worsening symptoms

## 2022-03-30 ENCOUNTER — HOSPITAL ENCOUNTER (OUTPATIENT)
Dept: GENERAL RADIOLOGY | Facility: SURGERY CENTER | Age: 72
Setting detail: HOSPITAL OUTPATIENT SURGERY
End: 2022-03-30

## 2022-03-30 ENCOUNTER — HOSPITAL ENCOUNTER (OUTPATIENT)
Facility: SURGERY CENTER | Age: 72
Setting detail: HOSPITAL OUTPATIENT SURGERY
Discharge: HOME | End: 2022-03-30
Attending: ANESTHESIOLOGY | Admitting: ANESTHESIOLOGY

## 2022-03-30 VITALS
DIASTOLIC BLOOD PRESSURE: 60 MMHG | BODY MASS INDEX: 32.15 KG/M2 | SYSTOLIC BLOOD PRESSURE: 117 MMHG | RESPIRATION RATE: 16 BRPM | HEART RATE: 57 BPM | OXYGEN SATURATION: 99 % | TEMPERATURE: 98.2 F | HEIGHT: 65 IN | WEIGHT: 193 LBS

## 2022-03-30 DIAGNOSIS — M47.816 LUMBAR FACET ARTHROPATHY: ICD-10-CM

## 2022-03-30 DIAGNOSIS — Z41.9 SURGERY, ELECTIVE: ICD-10-CM

## 2022-03-30 PROCEDURE — 64635 DESTROY LUMB/SAC FACET JNT: CPT | Performed by: ANESTHESIOLOGY

## 2022-03-30 PROCEDURE — 64636 DESTROY L/S FACET JNT ADDL: CPT | Performed by: ANESTHESIOLOGY

## 2022-03-30 PROCEDURE — 25010000002 MIDAZOLAM PER 1 MG: Performed by: ANESTHESIOLOGY

## 2022-03-30 PROCEDURE — 76000 FLUOROSCOPY <1 HR PHYS/QHP: CPT

## 2022-03-30 PROCEDURE — 25010000002 FENTANYL CITRATE (PF) 50 MCG/ML SOLUTION: Performed by: ANESTHESIOLOGY

## 2022-03-30 RX ORDER — FENTANYL CITRATE 50 UG/ML
INJECTION, SOLUTION INTRAMUSCULAR; INTRAVENOUS AS NEEDED
Status: DISCONTINUED | OUTPATIENT
Start: 2022-03-30 | End: 2022-03-30 | Stop reason: HOSPADM

## 2022-03-30 RX ORDER — SODIUM CHLORIDE 0.9 % (FLUSH) 0.9 %
10 SYRINGE (ML) INJECTION AS NEEDED
Status: DISCONTINUED | OUTPATIENT
Start: 2022-03-30 | End: 2022-03-30 | Stop reason: HOSPADM

## 2022-03-30 RX ORDER — SODIUM CHLORIDE 0.9 % (FLUSH) 0.9 %
10 SYRINGE (ML) INJECTION EVERY 12 HOURS SCHEDULED
Status: DISCONTINUED | OUTPATIENT
Start: 2022-03-30 | End: 2022-03-30 | Stop reason: HOSPADM

## 2022-03-30 RX ORDER — MIDAZOLAM HYDROCHLORIDE 1 MG/ML
INJECTION INTRAMUSCULAR; INTRAVENOUS AS NEEDED
Status: DISCONTINUED | OUTPATIENT
Start: 2022-03-30 | End: 2022-03-30 | Stop reason: HOSPADM

## 2022-04-11 ENCOUNTER — APPOINTMENT (OUTPATIENT)
Dept: WOMENS IMAGING | Facility: HOSPITAL | Age: 72
End: 2022-04-11

## 2022-04-11 PROCEDURE — 77067 SCR MAMMO BI INCL CAD: CPT | Performed by: RADIOLOGY

## 2022-04-11 PROCEDURE — 77063 BREAST TOMOSYNTHESIS BI: CPT | Performed by: RADIOLOGY

## 2022-05-11 ENCOUNTER — OFFICE VISIT (OUTPATIENT)
Dept: PAIN MEDICINE | Facility: CLINIC | Age: 72
End: 2022-05-11

## 2022-05-11 VITALS
HEART RATE: 83 BPM | OXYGEN SATURATION: 98 % | DIASTOLIC BLOOD PRESSURE: 81 MMHG | BODY MASS INDEX: 32.09 KG/M2 | RESPIRATION RATE: 18 BRPM | SYSTOLIC BLOOD PRESSURE: 117 MMHG | HEIGHT: 65 IN | WEIGHT: 192.6 LBS | TEMPERATURE: 96.6 F

## 2022-05-11 DIAGNOSIS — M47.816 LUMBAR FACET ARTHROPATHY: ICD-10-CM

## 2022-05-11 DIAGNOSIS — M70.61 TROCHANTERIC BURSITIS OF BOTH HIPS: ICD-10-CM

## 2022-05-11 DIAGNOSIS — M70.62 TROCHANTERIC BURSITIS OF BOTH HIPS: ICD-10-CM

## 2022-05-11 DIAGNOSIS — M47.816 SPONDYLOSIS OF LUMBAR REGION WITHOUT MYELOPATHY OR RADICULOPATHY: ICD-10-CM

## 2022-05-11 DIAGNOSIS — G89.29 CHRONIC BILATERAL LOW BACK PAIN WITHOUT SCIATICA: Primary | ICD-10-CM

## 2022-05-11 DIAGNOSIS — M54.50 CHRONIC BILATERAL LOW BACK PAIN WITHOUT SCIATICA: Primary | ICD-10-CM

## 2022-05-11 PROCEDURE — 99213 OFFICE O/P EST LOW 20 MIN: CPT | Performed by: NURSE PRACTITIONER

## 2022-05-11 RX ORDER — AMOXICILLIN 500 MG/1
CAPSULE ORAL
COMMUNITY
Start: 2022-04-25 | End: 2022-06-28

## 2022-05-11 NOTE — PROGRESS NOTES
CHIEF COMPLAINT  Follow-up for back pain.    Subjective   Dian Jefferson is a 72 y.o. female  who presents to the office for follow-up of procedure.  She completed a Radiofrequency ablation of bilateral L2-L5 on  3/30/2022 performed by Dr. Mirza for management of back pain. Patient reports 50% relief from the procedure, but still reports a lot of very low back pain as well as pain in the left lumbosacral area.  She reports that the pain is still unfortunately limiting her ability to walk long distance which is her goal.   She denies any numbness, tingling, pain, weakness in the bilateral LE's. Also reports bilateral GT bursa tenderness.      Patient remained masked during entire encounter. No cough present. I donned a mask and eye protection throughout entire visit. Prior to donning mask and eye protection, hand hygiene was performed, as well as when it was doffed.  I was closer than 6 feet, but not for an extended period of time. No obvious exposure to any bodily fluids.    Back Pain  This is a chronic problem. The current episode started more than 1 month ago. The problem occurs daily. The pain is present in the lumbar spine. The quality of the pain is described as aching and burning. The pain does not radiate. The pain is at a severity of 2/10. The symptoms are aggravated by standing (walking). Associated symptoms include weakness. Pertinent negatives include no chest pain or numbness. She has tried analgesics (rest, laying down flat) for the symptoms. The treatment provided mild relief.     PEG Assessment   What number best describes your pain on average in the past week?4  What number best describes how, during the past week, pain has interfered with your enjoyment of life?6  What number best describes how, during the past week, pain has interfered with your general activity?  5    The following portions of the patient's history were reviewed and updated as appropriate: allergies, current medications, past  "family history, past medical history, past social history, past surgical history and problem list.    Review of Systems   Constitutional: Negative for fatigue.   HENT: Negative for congestion.    Eyes: Negative for visual disturbance.   Respiratory: Positive for shortness of breath.    Cardiovascular: Negative for chest pain.   Gastrointestinal: Negative for constipation and diarrhea.   Genitourinary: Negative for difficulty urinating.   Musculoskeletal: Positive for back pain.   Neurological: Positive for weakness. Negative for numbness.   Psychiatric/Behavioral: Negative for sleep disturbance and suicidal ideas. The patient is not nervous/anxious.      I have reviewed and confirmed the accuracy of the ROS as documented by the MA/LPN/RN LIZABETH Rico     Vitals:    05/11/22 1455   BP: 117/81   Pulse: 83   Resp: 18   Temp: 96.6 °F (35.9 °C)   SpO2: 98%   Weight: 87.4 kg (192 lb 9.6 oz)   Height: 165.1 cm (65\")   PainSc:   2   PainLoc: Back     Objective   Physical Exam  Vitals and nursing note reviewed.   Constitutional:       General: She is not in acute distress.     Appearance: Normal appearance. She is not ill-appearing.   Pulmonary:      Effort: Pulmonary effort is normal. No respiratory distress.   Musculoskeletal:      Lumbar back: Tenderness and bony tenderness present.   Skin:     General: Skin is warm and dry.   Neurological:      Mental Status: She is alert and oriented to person, place, and time.      Motor: Motor function is intact.      Gait: Gait normal.   Psychiatric:         Mood and Affect: Mood normal.         Behavior: Behavior normal.       Assessment & Plan   Diagnoses and all orders for this visit:    1. Chronic bilateral low back pain without sciatica (Primary)  -     MRI Lumbar Spine Without Contrast; Future    2. Lumbar facet arthropathy    3. Spondylosis of lumbar region without myelopathy or radiculopathy  -     MRI Lumbar Spine Without Contrast; Future    4. Trochanteric " bursitis of both hips  -     Large Joint Arthrocentesis  -     Large Joint Arthrocentesis    --- MRI Lumbar Spine   --- Bilateral GT bursa injection in office   --- Consider LESI   --- Follow-up for procedure      As the clinician, I personally reviewed the NASH from 5/11/2022 while the patient was in the office today.    This document is intended for medical expert use only. Reading of this document by patients and/or patient's family without participating medical staff guidance may result in misinterpretation and unintended morbidity.   Any interpretation of such data is the responsibility of the patient and/or family member responsible for the patient in concert with their primary or specialist providers, not to be left for sources of online searches such as [employer], [employer] or similar queries. Relying on these approaches to knowledge may result in misinterpretation, misguided goals of care and even death should patients or family members try recommendations outside of the realm of professional medical care in a supervised way.

## 2022-06-01 ENCOUNTER — HOSPITAL ENCOUNTER (OUTPATIENT)
Dept: MRI IMAGING | Facility: HOSPITAL | Age: 72
Discharge: HOME | End: 2022-06-01
Admitting: NURSE PRACTITIONER

## 2022-06-01 DIAGNOSIS — G89.29 CHRONIC BILATERAL LOW BACK PAIN WITHOUT SCIATICA: ICD-10-CM

## 2022-06-01 DIAGNOSIS — M47.816 SPONDYLOSIS OF LUMBAR REGION WITHOUT MYELOPATHY OR RADICULOPATHY: ICD-10-CM

## 2022-06-01 DIAGNOSIS — M54.50 CHRONIC BILATERAL LOW BACK PAIN WITHOUT SCIATICA: ICD-10-CM

## 2022-06-01 PROCEDURE — 72148 MRI LUMBAR SPINE W/O DYE: CPT

## 2022-06-02 NOTE — PROGRESS NOTES
Let her know that her MRI is basically unchanged/stable from 2019. She has significant arthritis as well as some spinal stenosis present. We can try an epidural injection to see if she would respond better to this than she has with the MBB/RFA treatements.  L3/L4 versus L4/L5 LESI most likely. Let me know if she wishes to proceed.

## 2022-06-03 NOTE — PROGRESS NOTES
Spoke with Mrs. Jefferson this morning @ 8:16 AM informed her about her MRI results as well as LESI ,will think about it and will call us back if she decides to proceed with treatment . KALA

## 2022-06-20 DIAGNOSIS — E03.9 ACQUIRED HYPOTHYROIDISM: ICD-10-CM

## 2022-06-20 DIAGNOSIS — I10 ESSENTIAL HYPERTENSION: ICD-10-CM

## 2022-06-20 DIAGNOSIS — E78.5 HYPERLIPIDEMIA, UNSPECIFIED HYPERLIPIDEMIA TYPE: Primary | ICD-10-CM

## 2022-06-24 LAB
ALBUMIN SERPL-MCNC: 4.6 G/DL (ref 3.8–4.8)
ALBUMIN/GLOB SERPL: 2.2 {RATIO} (ref 1.2–2.2)
ALP SERPL-CCNC: 63 IU/L (ref 44–121)
ALT SERPL-CCNC: 40 IU/L (ref 0–32)
AST SERPL-CCNC: 27 IU/L (ref 0–40)
BASOPHILS # BLD AUTO: 0 X10E3/UL (ref 0–0.2)
BASOPHILS NFR BLD AUTO: 1 %
BILIRUB SERPL-MCNC: 0.4 MG/DL (ref 0–1.2)
BUN SERPL-MCNC: 19 MG/DL (ref 8–27)
BUN/CREAT SERPL: 18 (ref 12–28)
CALCIUM SERPL-MCNC: 9.8 MG/DL (ref 8.7–10.3)
CHLORIDE SERPL-SCNC: 108 MMOL/L (ref 96–106)
CHOLEST SERPL-MCNC: 213 MG/DL (ref 100–199)
CO2 SERPL-SCNC: 20 MMOL/L (ref 20–29)
CREAT SERPL-MCNC: 1.03 MG/DL (ref 0.57–1)
EGFRCR SERPLBLD CKD-EPI 2021: 58 ML/MIN/1.73
EOSINOPHIL # BLD AUTO: 0.1 X10E3/UL (ref 0–0.4)
EOSINOPHIL NFR BLD AUTO: 2 %
ERYTHROCYTE [DISTWIDTH] IN BLOOD BY AUTOMATED COUNT: 12.9 % (ref 11.7–15.4)
GLOBULIN SER CALC-MCNC: 2.1 G/DL (ref 1.5–4.5)
GLUCOSE SERPL-MCNC: 104 MG/DL (ref 65–99)
HCT VFR BLD AUTO: 41.8 % (ref 34–46.6)
HDLC SERPL-MCNC: 49 MG/DL
HGB BLD-MCNC: 13.8 G/DL (ref 11.1–15.9)
IMM GRANULOCYTES # BLD AUTO: 0 X10E3/UL (ref 0–0.1)
IMM GRANULOCYTES NFR BLD AUTO: 0 %
LDLC SERPL CALC-MCNC: 131 MG/DL (ref 0–99)
LDLC/HDLC SERPL: 2.7 RATIO (ref 0–3.2)
LYMPHOCYTES # BLD AUTO: 1.9 X10E3/UL (ref 0.7–3.1)
LYMPHOCYTES NFR BLD AUTO: 38 %
MCH RBC QN AUTO: 29.7 PG (ref 26.6–33)
MCHC RBC AUTO-ENTMCNC: 33 G/DL (ref 31.5–35.7)
MCV RBC AUTO: 90 FL (ref 79–97)
MONOCYTES # BLD AUTO: 0.4 X10E3/UL (ref 0.1–0.9)
MONOCYTES NFR BLD AUTO: 8 %
NEUTROPHILS # BLD AUTO: 2.5 X10E3/UL (ref 1.4–7)
NEUTROPHILS NFR BLD AUTO: 51 %
PLATELET # BLD AUTO: 302 X10E3/UL (ref 150–450)
POTASSIUM SERPL-SCNC: 5.1 MMOL/L (ref 3.5–5.2)
PROT SERPL-MCNC: 6.7 G/DL (ref 6–8.5)
RBC # BLD AUTO: 4.65 X10E6/UL (ref 3.77–5.28)
SARS-COV-2 AB SERPL QL IA: POSITIVE
SODIUM SERPL-SCNC: 143 MMOL/L (ref 134–144)
TRIGL SERPL-MCNC: 183 MG/DL (ref 0–149)
TSH SERPL DL<=0.005 MIU/L-ACNC: 1.79 UIU/ML (ref 0.45–4.5)
VLDLC SERPL CALC-MCNC: 33 MG/DL (ref 5–40)
WBC # BLD AUTO: 4.9 X10E3/UL (ref 3.4–10.8)

## 2022-06-28 ENCOUNTER — OFFICE VISIT (OUTPATIENT)
Dept: INTERNAL MEDICINE | Facility: CLINIC | Age: 72
End: 2022-06-28

## 2022-06-28 VITALS
SYSTOLIC BLOOD PRESSURE: 112 MMHG | HEART RATE: 76 BPM | HEIGHT: 65 IN | DIASTOLIC BLOOD PRESSURE: 70 MMHG | TEMPERATURE: 97.8 F | WEIGHT: 189.2 LBS | OXYGEN SATURATION: 98 % | BODY MASS INDEX: 31.52 KG/M2

## 2022-06-28 DIAGNOSIS — E03.9 ACQUIRED HYPOTHYROIDISM: ICD-10-CM

## 2022-06-28 DIAGNOSIS — E78.5 HYPERLIPIDEMIA, UNSPECIFIED HYPERLIPIDEMIA TYPE: Primary | ICD-10-CM

## 2022-06-28 DIAGNOSIS — E55.9 VITAMIN D DEFICIENCY: ICD-10-CM

## 2022-06-28 DIAGNOSIS — G89.29 OTHER CHRONIC PAIN: ICD-10-CM

## 2022-06-28 DIAGNOSIS — R73.09 ELEVATED GLUCOSE: ICD-10-CM

## 2022-06-28 DIAGNOSIS — F41.9 ANXIETY: ICD-10-CM

## 2022-06-28 DIAGNOSIS — I10 ESSENTIAL HYPERTENSION: ICD-10-CM

## 2022-06-28 PROCEDURE — 99214 OFFICE O/P EST MOD 30 MIN: CPT | Performed by: INTERNAL MEDICINE

## 2022-06-28 RX ORDER — EZETIMIBE 10 MG/1
10 TABLET ORAL DAILY
Qty: 90 TABLET | Refills: 1 | Status: SHIPPED | OUTPATIENT
Start: 2022-06-28 | End: 2022-07-12

## 2022-06-28 NOTE — PROGRESS NOTES
Subjective   Dian Jefferson is a 72 y.o. female here today to f/u on HTN, hyperlipidemia and hypothyroidism.      History of Present Illness   Pt has been taking BP meds as prescribed without any problems.  No HA  No episodes of orthostasis  Pt has been taking cholesterol meds as prescribed.  No difficulties with myalgias.   Pt has been doing well with thyroid meds.  Taking as perscribed without any complications  She cont to have severe LBP    The following portions of the patient's history were reviewed and updated as appropriate: allergies, current medications, past medical history, past social history and problem list.  No tob no etoh    Review of Systems    Objective   Physical Exam  Vitals reviewed.   Constitutional:       Appearance: She is well-developed.   HENT:      Head: Normocephalic and atraumatic.      Right Ear: External ear normal.      Left Ear: External ear normal.   Eyes:      Conjunctiva/sclera: Conjunctivae normal.      Pupils: Pupils are equal, round, and reactive to light.   Neck:      Thyroid: No thyromegaly.      Trachea: No tracheal deviation.   Cardiovascular:      Rate and Rhythm: Normal rate and regular rhythm.      Heart sounds: Normal heart sounds.   Pulmonary:      Effort: Pulmonary effort is normal.      Breath sounds: Normal breath sounds.   Abdominal:      General: Bowel sounds are normal. There is no distension.      Palpations: Abdomen is soft.      Tenderness: There is no abdominal tenderness.   Musculoskeletal:         General: No deformity. Normal range of motion.      Cervical back: Normal range of motion.   Skin:     General: Skin is warm and dry.   Neurological:      Mental Status: She is alert and oriented to person, place, and time.   Psychiatric:         Behavior: Behavior normal.         Thought Content: Thought content normal.         Judgment: Judgment normal.         Vitals:    06/28/22 1553   BP: 112/70   Pulse: 76   Temp: 97.8 °F (36.6 °C)   SpO2: 98%     Body mass  index is 31.48 kg/m².    Orders Only on 06/20/2022   Component Date Value Ref Range Status   • Glucose 06/22/2022 104 (A) 65 - 99 mg/dL Final   • BUN 06/22/2022 19  8 - 27 mg/dL Final   • Creatinine 06/22/2022 1.03 (A) 0.57 - 1.00 mg/dL Final   • EGFR Result 06/22/2022 58 (A) >59 mL/min/1.73 Final   • BUN/Creatinine Ratio 06/22/2022 18  12 - 28 Final   • Sodium 06/22/2022 143  134 - 144 mmol/L Final   • Potassium 06/22/2022 5.1  3.5 - 5.2 mmol/L Final   • Chloride 06/22/2022 108 (A) 96 - 106 mmol/L Final   • Total CO2 06/22/2022 20  20 - 29 mmol/L Final   • Calcium 06/22/2022 9.8  8.7 - 10.3 mg/dL Final   • Total Protein 06/22/2022 6.7  6.0 - 8.5 g/dL Final   • Albumin 06/22/2022 4.6  3.8 - 4.8 g/dL Final   • Globulin 06/22/2022 2.1  1.5 - 4.5 g/dL Final   • A/G Ratio 06/22/2022 2.2  1.2 - 2.2 Final   • Total Bilirubin 06/22/2022 0.4  0.0 - 1.2 mg/dL Final   • Alkaline Phosphatase 06/22/2022 63  44 - 121 IU/L Final   • AST (SGOT) 06/22/2022 27  0 - 40 IU/L Final   • ALT (SGPT) 06/22/2022 40 (A) 0 - 32 IU/L Final   • Total Cholesterol 06/22/2022 213 (A) 100 - 199 mg/dL Final   • Triglycerides 06/22/2022 183 (A) 0 - 149 mg/dL Final   • HDL Cholesterol 06/22/2022 49  >39 mg/dL Final   • VLDL Cholesterol Sukhi 06/22/2022 33  5 - 40 mg/dL Final   • LDL Chol Calc (NIH) 06/22/2022 131 (A) 0 - 99 mg/dL Final   • LDL/HDL RATIO 06/22/2022 2.7  0.0 - 3.2 ratio Final    Comment:                                     LDL/HDL Ratio                                              Men  Women                                1/2 Avg.Risk  1.0    1.5                                    Avg.Risk  3.6    3.2                                 2X Avg.Risk  6.2    5.0                                 3X Avg.Risk  8.0    6.1     • TSH 06/22/2022 1.790  0.450 - 4.500 uIU/mL Final   • WBC 06/22/2022 4.9  3.4 - 10.8 x10E3/uL Final   • RBC 06/22/2022 4.65  3.77 - 5.28 x10E6/uL Final   • Hemoglobin 06/22/2022 13.8  11.1 - 15.9 g/dL Final   • Hematocrit  06/22/2022 41.8  34.0 - 46.6 % Final   • MCV 06/22/2022 90  79 - 97 fL Final   • MCH 06/22/2022 29.7  26.6 - 33.0 pg Final   • MCHC 06/22/2022 33.0  31.5 - 35.7 g/dL Final   • RDW 06/22/2022 12.9  11.7 - 15.4 % Final   • Platelets 06/22/2022 302  150 - 450 x10E3/uL Final   • Neutrophil Rel % 06/22/2022 51  Not Estab. % Final   • Lymphocyte Rel % 06/22/2022 38  Not Estab. % Final   • Monocyte Rel % 06/22/2022 8  Not Estab. % Final   • Eosinophil Rel % 06/22/2022 2  Not Estab. % Final   • Basophil Rel % 06/22/2022 1  Not Estab. % Final   • Neutrophils Absolute 06/22/2022 2.5  1.4 - 7.0 x10E3/uL Final   • Lymphocytes Absolute 06/22/2022 1.9  0.7 - 3.1 x10E3/uL Final   • Monocytes Absolute 06/22/2022 0.4  0.1 - 0.9 x10E3/uL Final   • Eosinophils Absolute 06/22/2022 0.1  0.0 - 0.4 x10E3/uL Final   • Basophils Absolute 06/22/2022 0.0  0.0 - 0.2 x10E3/uL Final   • Immature Granulocyte Rel % 06/22/2022 0  Not Estab. % Final   • Immature Grans Absolute 06/22/2022 0.0  0.0 - 0.1 x10E3/uL Final   • SARS-COV-2 ANTIBODIES 06/22/2022 Positive  Negative Final    Comment: Results suggest recent or prior infection with SARS-CoV-2. Correlation  with epidemiologic risk factors and other clinical and laboratory  findings is recommended. Serologic results should not be used as the  sole basis to diagnose or exclude recent SARS-CoV-2 infection. False  positive results infrequently occur due to prior infection with other  human Coronaviruses.  This assay will not detect antibodies induced by the currently  available SARS-CoV-2 vaccines. The current vaccines elicit  antibodies specific to the viral spike protein. [employer] offers  two test codes that detect viral spike-specific antibodies:  252908 SARS-CoV-2 Semi-Quantitative Total Antibody, Juan and  063355 SARS-CoV-2 Antibody, IgG, Juan (Qualitative).  Positive results with this SARS-CoV-2 Antibodies, Nucleocapsid  assay suggest recent or previous natural infection with  SARS-CoV-2.          Current Outpatient Medications:   •  baclofen (LIORESAL) 10 MG tablet, Take 1 tablet by mouth 3 (Three) Times a Day., Disp: 30 tablet, Rfl: 0  •  cetirizine (ZyrTEC) 10 MG tablet, Take 1 tablet by mouth daily., Disp: , Rfl:   •  Cholecalciferol (VITAMIN D3) 2000 UNITS capsule, Take 1 capsule by mouth daily., Disp: , Rfl:   •  lisinopril (PRINIVIL,ZESTRIL) 20 MG tablet, Take 1 tablet by mouth Daily., Disp: 90 tablet, Rfl: 3  •  meloxicam (Mobic) 7.5 MG tablet, 1-2 tabs daily as needed, Disp: 30 tablet, Rfl: 2  •  Multiple Vitamins-Minerals (Airborne Gummies) chewable tablet, Chew., Disp: , Rfl:   •  rosuvastatin (CRESTOR) 40 MG tablet, Take 1 tablet by mouth Every Night., Disp: 90 tablet, Rfl: 3  •  Synthroid 25 MCG tablet, Take 1 tablet by mouth Daily., Disp: 90 tablet, Rfl: 1  •  vilazodone (Viibryd) 40 MG tablet tablet, Take 1 tablet by mouth Daily., Disp: 90 tablet, Rfl: 1  •  YUVAFEM 10 MCG tablet vaginal tablet, Insert 1 tablet into the vagina 2 (Two) Times a Week., Disp: 24 tablet, Rfl: 1  •  montelukast (Singulair) 10 MG tablet, Take 1 tablet by mouth Every Night., Disp: 30 tablet, Rfl: 3      Assessment & Plan   Diagnoses and all orders for this visit:    1. Hyperlipidemia, unspecified hyperlipidemia type (Primary)  -     CBC & Differential; Future  -     Comprehensive Metabolic Panel; Future  -     LP+LDL / HDL Ratio ([facility]); Future  -     TSH Rfx On Abnormal To Free T4; Future  -     Hemoglobin A1c; Future  -     Vitamin D 25 Hydroxy; Future    2. Essential hypertension  -     CBC & Differential; Future  -     Comprehensive Metabolic Panel; Future  -     LP+LDL / HDL Ratio ([facility]); Future  -     TSH Rfx On Abnormal To Free T4; Future  -     Hemoglobin A1c; Future  -     Vitamin D 25 Hydroxy; Future    3. Acquired hypothyroidism  -     CBC & Differential; Future  -     Comprehensive Metabolic Panel; Future  -     LP+LDL / HDL Ratio ([facility]); Future  -     TSH Rfx On Abnormal To Free T4; Future  -      Hemoglobin A1c; Future  -     Vitamin D 25 Hydroxy; Future    4. Other chronic pain  -     CBC & Differential; Future  -     Comprehensive Metabolic Panel; Future  -     LP+LDL / HDL Ratio ([facility]); Future  -     TSH Rfx On Abnormal To Free T4; Future  -     Hemoglobin A1c; Future  -     Vitamin D 25 Hydroxy; Future    5. Anxiety    6. Elevated glucose  -     Hemoglobin A1c; Future    7. Vitamin D deficiency  -     Vitamin D 25 Hydroxy; Future      1.  HTN-  Ok with current meds  2.  HPL- ok with Crestor though a little high   She does eat a lot of sheese and some red meat.  Add zetia  3.  Hypothyroidism -  Ok with current meds  4. Chronic pain-  No able to exercise due to chronic pain back  She has seeen [ain management  She occas takes meloxicam or baclofen  5.  Anxiety- doing well with the vybriid

## 2022-07-01 RX ORDER — LEVOTHYROXINE SODIUM 25 MCG
25 TABLET ORAL DAILY
Qty: 90 TABLET | Refills: 1 | Status: SHIPPED | OUTPATIENT
Start: 2022-07-01 | End: 2022-09-20

## 2022-07-01 NOTE — TELEPHONE ENCOUNTER
Caller: Dian Jefferson    Relationship: Self    Best call back number: [phone]      Requested Prescriptions:   Requested Prescriptions     Pending Prescriptions Disp Refills   • Synthroid 25 MCG tablet 90 tablet 1     Sig: Take 1 tablet by mouth Daily.        Pharmacy where request should be sent: [facility] [address] [place] [address] [address] - [phone]  - [phone] FX     Additional details provided by patient: PATIENT HAS LESS THAN 3 DAYS.    Does the patient have less than a 3 day supply:  [x] Yes  [] No    Annita Cam, PCT   07/01/22 12:43 EDT

## 2022-07-12 RX ORDER — EZETIMIBE 10 MG/1
TABLET ORAL
Qty: 90 TABLET | Refills: 1 | Status: SHIPPED | OUTPATIENT
Start: 2022-07-12 | End: 2022-09-20

## 2022-07-15 ENCOUNTER — TELEPHONE (OUTPATIENT)
Dept: INTERNAL MEDICINE | Facility: CLINIC | Age: 72
End: 2022-07-15

## 2022-07-15 NOTE — TELEPHONE ENCOUNTER
Caller: JeffersonDian    Relationship to patient: Self    Best call back number: [phone]    Date of exposure: NOT SURE    Date of positive COVID19 test: AT HOME TEST 7-15-22    Date if possible COVID19 exposure: NOT SURE    COVID19 symptoms: FEVER, CONGESTION     Date of initial quarantine: 7-13-22    Additional information or concerns: PATIENT WANTS TO KNOW WHAT SHE SHOULD BE DOING AND IF SHE SHOULD TAKE TYLENOL FOR FEVER LIKE SHE DID IN 2020, PATIENT IS ALSO CHECKING OXYGEN LEVELS AND THEY ARE RUNNING AROUND 96 AND HER HEART RATE IS 77. PATIENTS FEVER IS BEING CONTROLLED WHEN SHE TAKES TYLENOL. PATIENT ALSO STATES SHE IS TAKING MUCINEX FOR THE CONGESTION AND WANTS TO MAKE SURE EVERYTHING SHE IS DOING IS OK. PATIENT ALSO WANTS MORE INFORMATION ON PAXLOVID BUT SHE DOES FEEL OK AND WAS CONCERNED ABOUT WHAT SHE SHOULD DO IF IT GETS WORSE OVER THE WEEKEND. INFORMED PATIENT TO SEEK EMERGENCY CARE IF SHE DEVELOPS TROUBLE BREATHING, PAIN OR PRESSURE IN CHEST, CONFUSION, INABILITY TO STAY AWAKE, BLUISH LIPS OR FACE AND PATIENT EXPRESSED UNDERSTANDING.  PLEASE ADVISE.     What is the patients preferred pharmacy: [facility] - [place], [place] - [address] - [phone]  - [phone] FX  [phone]

## 2022-07-15 NOTE — TELEPHONE ENCOUNTER
Spoke with patient to let her know that she needed to schedule a video visit to discuss medications per Franca. She did not want to schedule a video visit at the present time. She said that she currently feels okay using her over the counter medications. She said she would see how she did over the weekend and would go to the ER if needed. I told her that she could call our office number for provider on call this weekend if she felt like she had anymore concerns. I told her that I was doumenting this call.

## 2022-09-20 RX ORDER — LEVOTHYROXINE SODIUM 25 MCG
TABLET ORAL
Qty: 90 TABLET | Refills: 1 | Status: SHIPPED | OUTPATIENT
Start: 2022-09-20 | End: 2023-03-23

## 2022-09-20 RX ORDER — EZETIMIBE 10 MG/1
TABLET ORAL
Qty: 90 TABLET | Refills: 1 | Status: SHIPPED | OUTPATIENT
Start: 2022-09-20

## 2022-10-06 ENCOUNTER — TELEPHONE (OUTPATIENT)
Dept: PAIN MEDICINE | Facility: CLINIC | Age: 72
End: 2022-10-06

## 2022-10-11 ENCOUNTER — PROCEDURE VISIT (OUTPATIENT)
Dept: PAIN MEDICINE | Facility: CLINIC | Age: 72
End: 2022-10-11

## 2022-10-11 VITALS
SYSTOLIC BLOOD PRESSURE: 111 MMHG | HEIGHT: 65 IN | HEART RATE: 73 BPM | TEMPERATURE: 97.8 F | RESPIRATION RATE: 12 BRPM | WEIGHT: 189.6 LBS | DIASTOLIC BLOOD PRESSURE: 74 MMHG | OXYGEN SATURATION: 97 % | BODY MASS INDEX: 31.59 KG/M2

## 2022-10-11 DIAGNOSIS — M70.61 TROCHANTERIC BURSITIS OF BOTH HIPS: Primary | ICD-10-CM

## 2022-10-11 DIAGNOSIS — M70.62 TROCHANTERIC BURSITIS OF BOTH HIPS: Primary | ICD-10-CM

## 2022-10-11 PROCEDURE — 20610 DRAIN/INJ JOINT/BURSA W/O US: CPT | Performed by: NURSE PRACTITIONER

## 2022-10-11 NOTE — PROGRESS NOTES
"CHIEF COMPLAINT: Hip Pain    Dian Jefferson is a 72 y.o. female.  She is here for the following procedure:  bilateral greater trochanteric bursa injection.     Vitals:    10/11/22 1516   BP: 111/74   BP Location: Right arm   Patient Position: Sitting   Cuff Size: Large Adult   Pulse: 73   Resp: 12   Temp: 97.8 °F (36.6 °C)   TempSrc: Temporal   SpO2: 97%   Weight: 86 kg (189 lb 9.6 oz)   Height: 165.1 cm (65\")   PainSc:   5   PainLoc: Comment: HIP       Bupivacaine 0.25% Lot#: [id number] Exp: 06/01/2024  ND:2382-8408-39  Depo Medrol 40 mg/ml Lot#: [id number] Exp: 02/2024  NDC:[address]-4      Large Joint Arthrocentesis: R greater trochanteric bursa  Date/Time: 10/11/2022 3:40 PM  Consent given by: patient  Site marked: site marked  Timeout: Immediately prior to procedure a time out was called to verify the correct patient, procedure, equipment, support staff and site/side marked as required   Supporting Documentation  Indications: pain and diagnostic evaluation   Procedure Details  Location: hip - R greater trochanteric bursa  Needle size: 25 G  Approach: lateral  Medication group details: Methylprednisolone 40 mg and 3 cc bupivicaine.  Patient tolerance: patient tolerated the procedure well with no immediate complications    Large Joint Arthrocentesis: L greater trochanteric bursa  Date/Time: 10/11/2022 3:40 PM  Consent given by: patient  Site marked: site marked  Timeout: Immediately prior to procedure a time out was called to verify the correct patient, procedure, equipment, support staff and site/side marked as required   Supporting Documentation  Indications: pain and diagnostic evaluation   Procedure Details  Location: hip - L greater trochanteric bursa  Needle size: 25 G  Approach: lateral  Medication group details: Methylprednisolone 40 mg and 3 cc bupivicain.  Patient tolerance: patient tolerated the procedure well with no immediate complications              Visit Diagnoses:  1. Trochanteric bursitis of both " hips      F/u 3-4 weeks. Will discuss low back at that time as well.     LIZABETH Rico  Pain Management

## 2022-10-21 ENCOUNTER — TELEPHONE (OUTPATIENT)
Dept: INTERNAL MEDICINE | Facility: CLINIC | Age: 72
End: 2022-10-21

## 2022-10-21 NOTE — TELEPHONE ENCOUNTER
She says her geo and jolie have been cramping more  She has noticed this for the past week.  She thinks this started with the zetia.  .  She is going to try magnesium nightly and discontinue the Zetia.  After 2 weeks if her symptoms have resolved she will try the Zetia again if symptoms recur she knows that the Zetia is the issue and will dc    Caller: Dian Jefferson    Relationship: Self    Best call back number: [phone]    What is the best time to reach you: ANYTIME DURING THE TODAY, BEFORE NOON ON 10/24/2022    Who are you requesting to speak with (clinical staff, provider,  specific staff member): DR. DENG    What was the call regarding: PATIENT STATES SHE IS EXPERIENCING CRAMPS IN HER HANDS A FEET THAT ARE VERY PAINFUL.    PATIENT STATES SHE IS WANTING TO SPEAK WITH DR. DENG ABOUT THE CRAMPING.

## 2022-11-03 ENCOUNTER — TELEPHONE (OUTPATIENT)
Dept: INTERNAL MEDICINE | Facility: CLINIC | Age: 72
End: 2022-11-03

## 2022-11-03 NOTE — TELEPHONE ENCOUNTER
Caller: [facility] [place] - [address] - [phone]  - [phone] FX    Relationship: Pharmacy    Best call back number: [phone]    What is the best time to reach you: ANYTIME     Who are you requesting to speak with (clinical staff, provider,  specific staff member): CLINICAL STAFF     Do you know the name of the person who called: GERONIMO    What was the call regarding: PATIENTS PHARMACY IS CALLING TO REQUEST A PRIOR AUTHORIZATION FOR THE PATIENTS vilazodone (Viibryd) 40 MG tablet         Do you require a callback: YES

## 2022-11-04 ENCOUNTER — OFFICE VISIT (OUTPATIENT)
Dept: PAIN MEDICINE | Facility: CLINIC | Age: 72
End: 2022-11-04

## 2022-11-04 ENCOUNTER — PREP FOR SURGERY (OUTPATIENT)
Dept: SURGERY | Facility: SURGERY CENTER | Age: 72
End: 2022-11-04

## 2022-11-04 VITALS
RESPIRATION RATE: 16 BRPM | SYSTOLIC BLOOD PRESSURE: 129 MMHG | HEART RATE: 73 BPM | OXYGEN SATURATION: 98 % | BODY MASS INDEX: 31.22 KG/M2 | HEIGHT: 65 IN | TEMPERATURE: 97.6 F | WEIGHT: 187.4 LBS | DIASTOLIC BLOOD PRESSURE: 81 MMHG

## 2022-11-04 DIAGNOSIS — M47.816 LUMBAR FACET ARTHROPATHY: ICD-10-CM

## 2022-11-04 DIAGNOSIS — M70.61 TROCHANTERIC BURSITIS OF BOTH HIPS: ICD-10-CM

## 2022-11-04 DIAGNOSIS — G89.29 CHRONIC BILATERAL LOW BACK PAIN WITHOUT SCIATICA: Primary | ICD-10-CM

## 2022-11-04 DIAGNOSIS — M54.50 CHRONIC BILATERAL LOW BACK PAIN WITHOUT SCIATICA: Primary | ICD-10-CM

## 2022-11-04 DIAGNOSIS — M53.3 SACROILIAC JOINT DYSFUNCTION: Primary | ICD-10-CM

## 2022-11-04 DIAGNOSIS — M70.62 TROCHANTERIC BURSITIS OF BOTH HIPS: ICD-10-CM

## 2022-11-04 PROCEDURE — 99214 OFFICE O/P EST MOD 30 MIN: CPT | Performed by: NURSE PRACTITIONER

## 2022-11-04 RX ORDER — SODIUM CHLORIDE 0.9 % (FLUSH) 0.9 %
10 SYRINGE (ML) INJECTION EVERY 12 HOURS SCHEDULED
Status: CANCELLED | OUTPATIENT
Start: 2022-11-04

## 2022-11-04 RX ORDER — SODIUM CHLORIDE 0.9 % (FLUSH) 0.9 %
10 SYRINGE (ML) INJECTION AS NEEDED
Status: CANCELLED | OUTPATIENT
Start: 2022-11-04

## 2022-11-04 NOTE — PROGRESS NOTES
CHIEF COMPLAINT  Follow up for back and hip pain  Pt reports 85% relief from bilateral greater trochanteric bursa injection 10/11/22     Subjective   Dian Jefferson is a 72 y.o. female  who presents for follow-up.  She has a history of back and hip pain. Primary complaint is left SI joint area pain. It does not radiate.  Aggravated by walking.      Procedures ---   Bilateral GT bursa injection on 10/11/2022 --- 85% relief which is mostly ongoing  Radiofrequency ablation of bilateral L2-L5 on  3/30/2022 --- 50% relief which is mostly ongoing     Back Pain  This is a chronic problem. The current episode started more than 1 month ago. The problem occurs daily. The pain is present in the lumbar spine. The quality of the pain is described as aching and burning. The pain does not radiate. The pain is at a severity of 4/10. The symptoms are aggravated by standing (walking). Associated symptoms include weakness. Pertinent negatives include no chest pain, fever or numbness. She has tried analgesics (rest, laying down flat) for the symptoms. The treatment provided mild relief.      PEG Assessment   What number best describes your pain on average in the past week?5  What number best describes how, during the past week, pain has interfered with your enjoyment of life?6  What number best describes how, during the past week, pain has interfered with your general activity?  6    Review of Pertinent Medical Data ---  MRI LUMBAR      The following portions of the patient's history were reviewed and updated as appropriate: allergies, current medications, past family history, past medical history, past social history, past surgical history and problem list.    Review of Systems   Constitutional: Negative for fever.   HENT: Negative for congestion.    Eyes: Negative for visual disturbance.   Respiratory: Negative for shortness of breath.    Cardiovascular: Negative for chest pain.   Gastrointestinal: Negative for constipation and  "diarrhea.   Genitourinary: Negative for difficulty urinating and dyspareunia.   Musculoskeletal: Positive for back pain. Negative for joint swelling.   Neurological: Positive for weakness. Negative for numbness.   Psychiatric/Behavioral: Positive for sleep disturbance. Negative for suicidal ideas.     I have reviewed and confirmed the accuracy of the ROS as documented by the MA/LPN/RN LIZABETH Rico    Vitals:    11/04/22 1550   BP: 129/81   Pulse: 73   Resp: 16   Temp: 97.6 °F (36.4 °C)   SpO2: 98%   Weight: 85 kg (187 lb 6.4 oz)   Height: 165.1 cm (65\")   PainSc:   4   PainLoc: Back     Objective   Physical Exam  Vitals and nursing note reviewed.   Constitutional:       General: She is not in acute distress.     Appearance: Normal appearance. She is not ill-appearing.   Pulmonary:      Effort: Pulmonary effort is normal. No respiratory distress.   Musculoskeletal:      Lumbar back: Tenderness and bony tenderness present.      Comments: +left SI joint tenderness  +left SI joint compression  +left thigh thrust  +left SHARIFA    Skin:     General: Skin is warm and dry.   Neurological:      Mental Status: She is alert and oriented to person, place, and time.      Motor: Motor function is intact.      Gait: Gait normal.   Psychiatric:         Mood and Affect: Mood normal.         Behavior: Behavior normal.       Assessment & Plan   Diagnoses and all orders for this visit:    1. Chronic bilateral low back pain without sciatica (Primary)    2. Trochanteric bursitis of both hips    3. Lumbar facet arthropathy      --- Left SI joint injection     ----------  Education about Sacroiliac joint injections:  This Sacroiliac joint injection (blockade) we have suggested is intended for diagnostic purposes, with the intent of offering the patient Radiofrequency thermal rhizotomy (RF) of the sensory branches to the joint if the block is diagnostically effective.  The diagnostic blockade is necessary to determine the " likelihood that RF therapy could be efficacious in providing long term relief to the patient.    In this procedure, the sacroiliac joint is aligned with imaging, and under image guidance a needle is placed with the needle tip into the joint.  The needle position is confirmed to be appropriately in the joint before injection of medication into the joint.  When xray fluoroscopy is used, contrast dye is used to confirm a proper arthrogram (i.e., outline of the joint).  When ultrasound is used, IV fluid (normal saline) is injected to see the flow of the fluid into the joint.  Once confirmed, then the medication can be injected into the joint.  Oftentimes this medication is a combination of local anesthetics (for diagnostic purposes) and also a steroid (to decrease irritation & inflammation in the joint, also known as sacroilitis).      Medically, a successful RF procedure should provide a patient with 50% pain relief or more for at least 6 months.  Clinical experience suggests that successful patients receive relief more in the range of 12 months on average.  We also discussed that many patients receive therapeutic success from the intraarticular joint injection, and may not require RF ablation.  If a patient receives more than 8 weeks of relief from joint injection, then occasional repeat joint blocks for therapeutic purposes is a very reasonable alternative therapy.  This course of therapy is consistent with our LCDs according to our CMS  in the area, and therefore other insurance providers should follow accordingly.  We will monitor our patients to screen for these therapeutic responders and will offer RF therapy only when necessary.      We discussed that joint injections & also RF procedures are not without risks.  Best practices regarding anticoagulant use & neuraxial procedures will be respected.  Oftentimes a patient on an anticoagulant can be offered a joint injection safely, but again this is not  risk-free, and such patients give consent with regards to this increased bleeding risk, which could cause problems including but not limited to worsening of pain, nerve damage, or muscle damage.  Patients that are ill or otherwise may be at risk for sepsis will not have their spines accessed by neuraxial injections of any type.  This patient will not be offered these therapies if there is an increased risk.   We discussed that there is a risk of postprocedural pain and also a risk of worsening of clinical picture with these procedures as with any neuraxial procedure.    ----------    --- May need to repeat lumbar RFA soon  --- Repeat GT bursa injection every 90 days as needed   --- Follow-up for procedure and 3-4 weeks post          As the clinician, I personally reviewed the NASH from 11/4/2022 while the patient was in the office today.    This document is intended for medical expert use only. Reading of this document by patients and/or patient's family without participating medical staff guidance may result in misinterpretation and unintended morbidity.   Any interpretation of such data is the responsibility of the patient and/or family member responsible for the patient in concert with their primary or specialist providers, not to be left for sources of online searches such as [employer], [employer]i or similar queries. Relying on these approaches to knowledge may result in misinterpretation, misguided goals of care and even death should patients or family members try recommendations outside of the realm of professional medical care in a supervised way.    Patient remained masked during entire encounter. No cough present. I donned a mask and eye protection throughout entire visit. Prior to donning mask and eye protection, hand hygiene was performed, as well as when it was doffed.  I was closer than 6 feet, but not for an extended period of time. No obvious exposure to any bodily fluids.

## 2022-11-16 ENCOUNTER — TELEPHONE (OUTPATIENT)
Dept: INTERNAL MEDICINE | Facility: CLINIC | Age: 72
End: 2022-11-16

## 2022-11-16 NOTE — TELEPHONE ENCOUNTER
LMOM INFORMING PT THAT THE PA WAS DONE LAST NIGHT, WE ARE WAITING ON A RESPONSE    ----- Message from Terry Santiago Rep sent at 11/16/2022  9:38 AM EST -----  Regarding: FW: RX  They called again today and said patient is out of her medication.        ----- Message -----  From: Kelly Campos RegSched Rep  Sent: 11/15/2022  12:00 PM EST  To: Charo Ibarra MA  Subject: RX                                               Inga from [employer] was following up on a PA request for patient's generic Vybrid.  Said she faxed the request and again yesterday and that the patient was almost out.   [phone]

## 2022-12-05 ENCOUNTER — TRANSCRIBE ORDERS (OUTPATIENT)
Dept: SURGERY | Facility: SURGERY CENTER | Age: 72
End: 2022-12-05

## 2022-12-05 DIAGNOSIS — Z41.9 SURGERY, ELECTIVE: Primary | ICD-10-CM

## 2022-12-05 PROBLEM — M53.3 SACROILIAC JOINT DYSFUNCTION: Status: ACTIVE | Noted: 2022-12-05

## 2022-12-06 NOTE — H&P
[facility]   HISTORY AND PHYSICAL    Patient Name: Dian Jefferson  : 1950  MRN: [id number]  Primary Care Physician:  Mallika Stewart MD  Date of admission: 2022    Subjective   Subjective     Chief Complaint: Low back pain    History of Present Illness  Aching and burning low back pain that does not radiate into lower extremity.  Pain is worse on the left side.      Review of Systems   Constitutional: Negative for chills and fever.   Respiratory: Negative for cough and shortness of breath.    Musculoskeletal: Positive for back pain.        Personal History     Past Medical History:   Diagnosis Date   • Anxiety    • Colon polyp    • GERD (gastroesophageal reflux disease)    • HLD (hyperlipidemia)    • Hot flash, menopausal    • HTN (hypertension)    • Hypothyroidism    • Mammogram abnormal    • Myalgia    • Vitamin D deficiency        Past Surgical History:   Procedure Laterality Date   • BREAST SURGERY  1980    ENLARGEMENT   • CATARACT EXTRACTION, BILATERAL Bilateral 2021, 3/2021   • COLONOSCOPY     • COLONOSCOPY N/A 3/7/2022    Procedure: COLONOSCOPY;  Surgeon: Lorraine Alba MD;  [place] [facility] [place] [facility]  Service: Gastroenterology;  Laterality: N/A;  Diverticulosis   • MEDIAL BRANCH BLOCK Bilateral 2022    Procedure: LUMBAR MEDIAL BRANCH BLOCK Bilateral ~L2-L5 x2 (2 weeks apart);  Surgeon: Mildred Mirza MD;  [place] [facility] [place] [facility];  Service: Pain Management;  Laterality: Bilateral;   • MEDIAL BRANCH BLOCK Bilateral 3/2/2022    Procedure: LUMBAR MEDIAL BRANCH BLOCK Bilateral ~L2-L5 x2 (2 weeks apart);  Surgeon: Mildred Mirza MD;  [place] [facility] [place] [facility] [place];  Service: Pain Management;  Laterality: Bilateral;   • RADIOFREQUENCY ABLATION Bilateral 3/30/2022    Procedure: RADIOFREQUENCY ABLATION LUMBAR bilateral L2-L5;  Surgeon: Mildred Mirza MD;  [place] [facility] [place] [facility] [place];  Service: Pain Management;  Laterality: Bilateral;   • TONSILLECTOMY     • TUBAL ABDOMINAL LIGATION         Family  History: family history includes Breast cancer in her maternal aunt and mother; Colon polyps in her sister; Dementia in her mother; Depression in her mother; Heart attack in her father; Hypertension in her father, sister, and sister. Otherwise pertinent FHx was reviewed and not pertinent to current issue.    Social History:  reports that she has never smoked. She has never used smokeless tobacco. She reports current alcohol use. She reports that she does not use drugs.    Home Medications:  Airborne Gummies, Vitamin D3, baclofen, cetirizine, estradiol, ezetimibe, levothyroxine, lisinopril, meloxicam, montelukast, rosuvastatin, and vilazodone    Allergies:  No Known Allergies    Objective    Objective     Vitals:   Temp:  [97.3 °F (36.3 °C)] 97.3 °F (36.3 °C)  Heart Rate:  [77] 77  Resp:  [17] 17  BP: (128)/(62) 128/62    Physical Exam  Constitutional:       General: She is not in acute distress.  Pulmonary:      Effort: Pulmonary effort is normal. No respiratory distress.   Neurological:      Mental Status: She is alert.   Psychiatric:         Mood and Affect: Mood normal.         Thought Content: Thought content normal.         Result Review    Result Review:  I have personally reviewed the results from the time of this admission to 12/7/2022 14:41 EST and agree with these findings:  []  Laboratory list / accordion  []  Microbiology  []  Radiology  []  EKG/Telemetry   []  Cardiology/Vascular   []  Pathology  []  Old records  []  Other:  Most notable findings include: No new      Assessment & Plan   Assessment / Plan     Brief Patient Summary:  Dian Jefferson is a 72 y.o. female who has chronic left-sided low back pain    Active Hospital Problems:  Active Hospital Problems    Diagnosis    • **Sacroiliac joint dysfunction      Plan: Left sacroiliac joint injection      DVT prophylaxis:  No DVT prophylaxis order currently exists.        Mildred Mirza MD

## 2022-12-06 NOTE — DISCHARGE INSTRUCTIONS
[facility] - Post-procedure Instructions          --  While there are no absolute restrictions, it is recommended that you do not perform strenuous activity today. In the morning, you may resume your level of activity as before your block.    --  If you have a band-aid at your injection site, please remove it later today. Observe the area for any redness, swelling, pus-like drainage, or a temperature over 101°. If any of these symptoms occur, please call your doctor at [phone]. If after office hours, leave a message and the on-call provider will return your call.    --  Ice may be applied to your injection site. It is recommended you avoid direct heat (heating pad; hot tub) for 1-2 days.    --  Call [facility] at [phone] if you experience persistent headache, persistent bleeding from the injection site, or severe pain not relieved by heat or oral medication.    --  Do not make important decisions today.    --  Due to the effects of the block and/or the I.V. Sedation, DO NOT drive or operate hazardous machinery for 12 hours.  Local anesthetics may cause numbness after procedure and precautions must be taken with regards to operating equipment as well as with walking, even if ambulating with assistance of another person or with an assistive device.    --  Do not drink alcohol for 12 hours.    -- You may return to work tomorrow, or as directed by your referring doctor.    --  Occasionally you may notice a slight increase in your pain after the procedure. This should start to improve within the next 24-48 hours. Radiofrequency ablation procedure pain may last 3-4 weeks.    --  It may take as long as 3-4 days before you notice a gradual improvement in your pain and/or other symptoms.    -- You may continue to take your prescribed pain medication as needed.    --  Some normal possible side effects of steroid use could include fluid retention, increased blood sugar, dull headache,  increased sweating, increased appetite, mood swings and flushing.    --  Diabetics are recommended to watch their blood glucose level closely for 24-48 hours after the injection.    --  Must stay in PACU for 20 min upon arrival and prove no leg weakness before being discharged.    --  IN THE EVENT OF A LIFE THREATENING EMERGENCY, (CHEST PAIN, BREATHING DIFFICULTIES, PARALYSIS…) YOU SHOULD GO TO YOUR NEAREST EMERGENCY ROOM.    --  You should be contacted by our office within 2-3 days to schedule follow up or next appointment date.  If not contacted within 7 days, please call the office at [phone]

## 2022-12-07 ENCOUNTER — HOSPITAL ENCOUNTER (OUTPATIENT)
Dept: GENERAL RADIOLOGY | Facility: SURGERY CENTER | Age: 72
Setting detail: HOSPITAL OUTPATIENT SURGERY
End: 2022-12-07

## 2022-12-07 ENCOUNTER — HOSPITAL ENCOUNTER (OUTPATIENT)
Facility: SURGERY CENTER | Age: 72
Setting detail: HOSPITAL OUTPATIENT SURGERY
Discharge: HOME | End: 2022-12-07
Attending: ANESTHESIOLOGY | Admitting: ANESTHESIOLOGY

## 2022-12-07 VITALS
OXYGEN SATURATION: 97 % | DIASTOLIC BLOOD PRESSURE: 68 MMHG | TEMPERATURE: 97.3 F | HEIGHT: 65 IN | WEIGHT: 185 LBS | RESPIRATION RATE: 16 BRPM | BODY MASS INDEX: 30.82 KG/M2 | SYSTOLIC BLOOD PRESSURE: 110 MMHG | HEART RATE: 61 BPM

## 2022-12-07 DIAGNOSIS — M53.3 SACROILIAC JOINT DYSFUNCTION: ICD-10-CM

## 2022-12-07 DIAGNOSIS — Z41.9 SURGERY, ELECTIVE: ICD-10-CM

## 2022-12-07 PROCEDURE — 77002 NEEDLE LOCALIZATION BY XRAY: CPT

## 2022-12-07 PROCEDURE — 25010000002 IOPAMIDOL 61 % SOLUTION: Performed by: ANESTHESIOLOGY

## 2022-12-07 PROCEDURE — 27096 INJECT SACROILIAC JOINT: CPT | Performed by: ANESTHESIOLOGY

## 2022-12-07 PROCEDURE — G0260 INJ FOR SACROILIAC JT ANESTH: HCPCS | Performed by: ANESTHESIOLOGY

## 2022-12-07 PROCEDURE — 76000 FLUOROSCOPY <1 HR PHYS/QHP: CPT

## 2022-12-07 RX ORDER — SODIUM CHLORIDE 0.9 % (FLUSH) 0.9 %
10 SYRINGE (ML) INJECTION AS NEEDED
Status: DISCONTINUED | OUTPATIENT
Start: 2022-12-07 | End: 2022-12-07 | Stop reason: HOSPADM

## 2022-12-07 RX ORDER — SODIUM CHLORIDE 0.9 % (FLUSH) 0.9 %
10 SYRINGE (ML) INJECTION EVERY 12 HOURS SCHEDULED
Status: DISCONTINUED | OUTPATIENT
Start: 2022-12-07 | End: 2022-12-07 | Stop reason: HOSPADM

## 2022-12-07 NOTE — OP NOTE
Left Sacroiliac Joint Injection  [facility]      PREOPERATIVE DIAGNOSIS:   Sacroiliac joint dysfunction    POSTOPERATIVE DIAGNOSIS:  Sacroiliac joint dysfunction    PROCEDURE:  Sacroiliac Joint Injection, with fluoroscopic guidance [facility] [id number] -LT    PRE-PROCEDURE DISCUSSION WITH PATIENT:    Risks and complications were discussed with the patient prior to starting the procedure and informed consent was obtained.  We discussed various topics including but not limited to bleeding, infection, injury, postprocedural site soreness, painful flareup, worsening of clinical picture, paralysis, coma, and death.     SURGEON:  Mildred Mirza MD    REASON FOR PROCEDURE:    Patient has pain consistent with SI pathology on history and physical exam.    SEDATION:  Patient declined administration of moderate sedation    ANESTHETIC AGENT:  Lidocaine 1%  STEROID AGENT:  15mg Dexamethasone    DESCRIPTON OF PROCEDURE:  After obtaining informed consent, IV access was not obtained in the preoperative area.  The patient was transported to the operative suite and placed in the prone position. EKG, blood pressure, and pulse oximeter were monitored. The lumbosacral area was prepped with Chloraprep and draped in a sterile fashion. Under fluoroscopic guidance the inferior most portion of the left SI joint was identified. The overlying skin and subcutaneous tissue was anesthetized with 1% lidocaine. A 22-gauge spinal needle was then advanced under fluoroscopic guidance in a coaxial view into the joint space.  After negative aspiration, 1 mL of Isovue was injected, and after seeing appropriate spread into the joint a volume of 3ml of the above medication was injected.    Needle was removed intact.      Vital signs remained stable.  The onset of analgesia was noted.      ESTIMATED BLOOD LOSS:  minimal  SPECIMENS:  None  COMPLICATIONS:  No complications were noted., There was no indication of vascular uptake on live injection of  contrast dye. and There was no indication of intrathecal uptake on live injection of contrast dye.    TOLERANCE & DISCHARGE CONDITION:    The patient tolerated the procedure well.  The patient was transported to the recovery area without difficulties.  The patient was discharged to home under the care of family in stable and satisfactory condition.    PLAN OF CARE:  1. The patient was given our standard instruction sheet and will resume all medications as per the medication reconciliation sheet.  2. The patient will Return to clinic 4-6 wks.  3. The patient is instructed to keep an account of pain relief after the procedure.

## 2022-12-08 ENCOUNTER — TELEPHONE (OUTPATIENT)
Dept: PAIN MEDICINE | Facility: CLINIC | Age: 72
End: 2022-12-08

## 2023-01-10 DIAGNOSIS — E78.5 HYPERLIPIDEMIA, UNSPECIFIED HYPERLIPIDEMIA TYPE: ICD-10-CM

## 2023-01-10 RX ORDER — ROSUVASTATIN CALCIUM 40 MG/1
TABLET, COATED ORAL
Qty: 90 TABLET | Refills: 1 | Status: SHIPPED | OUTPATIENT
Start: 2023-01-10

## 2023-01-10 RX ORDER — LISINOPRIL 20 MG/1
TABLET ORAL
Qty: 90 TABLET | Refills: 1 | Status: SHIPPED | OUTPATIENT
Start: 2023-01-10

## 2023-02-09 ENCOUNTER — OFFICE VISIT (OUTPATIENT)
Dept: INTERNAL MEDICINE | Facility: CLINIC | Age: 73
End: 2023-02-09
Payer: MEDICARE

## 2023-02-09 VITALS
TEMPERATURE: 97.9 F | BODY MASS INDEX: 31.54 KG/M2 | SYSTOLIC BLOOD PRESSURE: 120 MMHG | HEIGHT: 65 IN | DIASTOLIC BLOOD PRESSURE: 72 MMHG | WEIGHT: 189.3 LBS | HEART RATE: 81 BPM | OXYGEN SATURATION: 98 %

## 2023-02-09 DIAGNOSIS — E78.5 HYPERLIPIDEMIA, UNSPECIFIED HYPERLIPIDEMIA TYPE: ICD-10-CM

## 2023-02-09 DIAGNOSIS — R73.09 ELEVATED GLUCOSE: ICD-10-CM

## 2023-02-09 DIAGNOSIS — Z00.00 MEDICARE ANNUAL WELLNESS VISIT, SUBSEQUENT: Primary | ICD-10-CM

## 2023-02-09 DIAGNOSIS — I10 ESSENTIAL HYPERTENSION: ICD-10-CM

## 2023-02-09 DIAGNOSIS — K21.9 GASTROESOPHAGEAL REFLUX DISEASE, UNSPECIFIED WHETHER ESOPHAGITIS PRESENT: ICD-10-CM

## 2023-02-09 PROCEDURE — 1125F AMNT PAIN NOTED PAIN PRSNT: CPT | Performed by: INTERNAL MEDICINE

## 2023-02-09 PROCEDURE — G0439 PPPS, SUBSEQ VISIT: HCPCS | Performed by: INTERNAL MEDICINE

## 2023-02-09 PROCEDURE — 1170F FXNL STATUS ASSESSED: CPT | Performed by: INTERNAL MEDICINE

## 2023-02-09 PROCEDURE — 99213 OFFICE O/P EST LOW 20 MIN: CPT | Performed by: INTERNAL MEDICINE

## 2023-02-09 PROCEDURE — 1159F MED LIST DOCD IN RCRD: CPT | Performed by: INTERNAL MEDICINE

## 2023-02-09 RX ORDER — LIDOCAINE 50 MG/G
1 OINTMENT TOPICAL
Qty: 30 EACH | Refills: 3 | Status: SHIPPED | OUTPATIENT
Start: 2023-02-09

## 2023-02-09 NOTE — PATIENT INSTRUCTIONS
Medicare Wellness  Personal Prevention Plan of Service     Date of Office Visit:    Encounter Provider:  Mallika Stewart MD  Place of Service:  [facility]  Patient Name: Dian Jefferson  :  1950    As part of the Medicare Wellness portion of your visit today, we are providing you with this personalized preventive plan of services (PPPS). This plan is based upon recommendations of the [place] Preventive Services Task Force (USPSTF) and the Advisory Committee on Immunization Practices (ACIP).    This lists the preventive care services that should be considered, and provides dates of when you are due. Items listed as completed are up-to-date and do not require any further intervention.    Health Maintenance   Topic Date Due    DXA SCAN  Never done    COVID-19 Vaccine (1) Never done    Pneumococcal Vaccine 65+ (1 - PCV) Never done    INFLUENZA VACCINE  2022    ANNUAL WELLNESS VISIT  2022    LIPID PANEL  2024    MAMMOGRAM  2024    COLORECTAL CANCER SCREENING  2025    TDAP/TD VACCINES (2 - Td or Tdap) 2027    HEPATITIS C SCREENING  Completed    PAP SMEAR  Discontinued    ZOSTER VACCINE  Discontinued       No orders of the defined types were placed in this encounter.      No follow-ups on file.          Medicare Wellness  Personal Prevention Plan of Service     Date of Office Visit:    Encounter Provider:  Mallika Stewart MD  Place of Service:  [facility]  Patient Name: Dian Jefferson  :  1950    As part of the Medicare Wellness portion of your visit today, we are providing you with this personalized preventive plan of services (PPPS). This plan is based upon recommendations of the [place] Preventive Services Task Force (USPSTF) and the Advisory Committee on Immunization Practices (ACIP).    This lists the preventive care services that should be considered, and provides dates of when you are due. Items  listed as completed are up-to-date and do not require any further intervention.    Health Maintenance   Topic Date Due    DXA SCAN  Never done    COVID-19 Vaccine (1) Never done    Pneumococcal Vaccine 65+ (1 - PCV) Never done    INFLUENZA VACCINE  2022    LIPID PANEL  2024    ANNUAL WELLNESS VISIT  2024    MAMMOGRAM  2024    COLORECTAL CANCER SCREENING  2025    TDAP/TD VACCINES (2 - Td or Tdap) 2027    HEPATITIS C SCREENING  Completed    PAP SMEAR  Discontinued    ZOSTER VACCINE  Discontinued       No orders of the defined types were placed in this encounter.      Return in about 6 months (around 2023).          Medicare Wellness  Personal Prevention Plan of Service     Date of Office Visit:    Encounter Provider:  Mallika Stewart MD  Place of Service:  [facility]  Patient Name: Dian Jefferson  :  1950    As part of the Medicare Wellness portion of your visit today, we are providing you with this personalized preventive plan of services (PPPS). This plan is based upon recommendations of the [place] Preventive Services Task Force (USPSTF) and the Advisory Committee on Immunization Practices (ACIP).    This lists the preventive care services that should be considered, and provides dates of when you are due. Items listed as completed are up-to-date and do not require any further intervention.    Health Maintenance   Topic Date Due    DXA SCAN  Never done    COVID-19 Vaccine (1) Never done    Pneumococcal Vaccine 65+ (1 - PCV) Never done    INFLUENZA VACCINE  2022    LIPID PANEL  2024    ANNUAL WELLNESS VISIT  2024    MAMMOGRAM  2024    COLORECTAL CANCER SCREENING  2025    TDAP/TD VACCINES (2 - Td or Tdap) 2027    HEPATITIS C SCREENING  Completed    PAP SMEAR  Discontinued    ZOSTER VACCINE  Discontinued       No orders of the defined types were placed in this encounter.      Return in about 6 months  (around 8/9/2023).

## 2023-02-09 NOTE — PROGRESS NOTES
The ABCs of the Annual Wellness Visit  Subsequent Medicare Wellness Visit    Subjective    Dian Jefferson is a 73 y.o. female who presents for a Subsequent Medicare Wellness Visit.    The following portions of the patient's history were reviewed and   updated as appropriate: allergies, current medications, past medical history, past social history and problem list.    Compared to one year ago, the patient feels her physical   health is worse.due to hips and back    Compared to one year ago, the patient feels her mental   health is the same.    Recent Hospitalizations:  She was not admitted to the hospital during the last year.       Current Medical Providers:  Patient Care Team:  Mallika Stewart MD as PCP - General (Internal Medicine)  Britney Cole MD as Consulting Physician (Obstetrics and Gynecology)  Terra Paul APRN as Nurse Practitioner (Pain Medicine)    Outpatient Medications Prior to Visit   Medication Sig Dispense Refill   • baclofen (LIORESAL) 10 MG tablet Take 1 tablet by mouth 3 (Three) Times a Day. 30 tablet 0   • cetirizine (ZyrTEC) 10 MG tablet Take 1 tablet by mouth daily.     • Cholecalciferol (VITAMIN D3) 2000 UNITS capsule Take 1 capsule by mouth daily.     • ezetimibe (ZETIA) 10 MG tablet Take 1 tablet by mouth once daily 90 tablet 1   • lisinopril (PRINIVIL,ZESTRIL) 20 MG tablet Take 1 tablet by mouth once daily 90 tablet 1   • meloxicam (Mobic) 7.5 MG tablet 1-2 tabs daily as needed 30 tablet 2   • montelukast (Singulair) 10 MG tablet Take 1 tablet by mouth Every Night. 30 tablet 3   • Multiple Vitamins-Minerals (Airborne Gummies) chewable tablet Chew.     • rosuvastatin (CRESTOR) 40 MG tablet TAKE 1 TABLET BY MOUTH ONCE DAILY AT NIGHT 90 tablet 1   • Synthroid 25 MCG tablet Take 1 tablet by mouth once daily 90 tablet 1   • vilazodone (Viibryd) 40 MG tablet tablet Take 1 tablet by mouth Daily. 90 tablet 1   • YUVAFEM 10 MCG tablet vaginal tablet Insert 1 tablet into the vagina 2 (Two)  "Times a Week. 24 tablet 1     No facility-administered medications prior to visit.       No opioid medication identified on active medication list. I have reviewed chart for other potential  high risk medication/s and harmful drug interactions in the elderly.          Aspirin is not on active medication list.  Aspirin use is not indicated based on review of current medical condition/s. Risk of harm outweighs potential benefits.  .    Patient Active Problem List   Diagnosis   • Anxiety   • Gastroesophageal reflux disease   • Menopausal flushing   • Essential hypertension   • Hyperlipidemia   • Hypothyroidism   • Abnormal mammogram   • Breast implant rupture   • Vitamin D deficiency   • Tubular adenoma of colon   • Sigmoid diverticulosis   • Internal hemorrhoids   • DDD (degenerative disc disease), lumbar   • Spinal stenosis, lumbar region, with neurogenic claudication   • Spondylosis of lumbar region without myelopathy or radiculopathy   • Chronic bilateral low back pain without sciatica   • Personal history of colonic polyps   • Nuclear cataract   • Sacroiliac joint dysfunction     Advance Care Planning  Advance Directive is not on file.  ACP discussion was held with the patient during this visit. Patient has an advance directive (not in EMR), copy requested.     Objective    Vitals:    02/09/23 1358   BP: 120/72   BP Location: Left arm   Patient Position: Sitting   Cuff Size: Adult   Pulse: 81   Temp: 97.9 °F (36.6 °C)   TempSrc: Temporal   SpO2: 98%   Weight: 85.9 kg (189 lb 4.8 oz)   Height: 165.1 cm (65\")   PainSc:   4   PainLoc: Hip     Estimated body mass index is 31.5 kg/m² as calculated from the following:    Height as of this encounter: 165.1 cm (65\").    Weight as of this encounter: 85.9 kg (189 lb 4.8 oz).    BMI is >= 30 and <35. (Class 1 Obesity). The following options were offered after discussion;: exercise counseling/recommendations and nutrition counseling/recommendations      Does the patient have " evidence of cognitive impairment? No    Lab Results   Component Value Date    CHLPL 190 02/03/2023    TRIG 133 02/03/2023    HDL 59 02/03/2023     (H) 02/03/2023    VLDL 23 02/03/2023    HGBA1C 5.80 (H) 02/03/2023        HEALTH RISK ASSESSMENT    Smoking Status:  Social History     Tobacco Use   Smoking Status Never   Smokeless Tobacco Never     Alcohol Consumption:  Social History     Substance and Sexual Activity   Alcohol Use Yes    Comment: OCC. USE     Fall Risk Screen:    JEFFREY Fall Risk Assessment was completed, and patient is at LOW risk for falls.Assessment completed on:2/9/2023    Depression Screening:  PHQ-2/PHQ-9 Depression Screening 2/9/2023   Little Interest or Pleasure in Doing Things 0-->not at all   Feeling Down, Depressed or Hopeless 0-->not at all   PHQ-9: Brief Depression Severity Measure Score 0       Health Habits and Functional and Cognitive Screening:  Functional & Cognitive Status 2/9/2023   Do you have difficulty preparing food and eating? No   Do you have difficulty bathing yourself, getting dressed or grooming yourself? No   Do you have difficulty using the toilet? No   Do you have difficulty moving around from place to place? No   Do you have trouble with steps or getting out of a bed or a chair? No   Current Diet Limited Junk Food        Current Diet Comment -   Dental Exam Up to date   Eye Exam Up to date   Exercise (times per week) 5 times per week   Current Exercises Include House Cleaning   Current Exercise Activities Include -   Do you need help using the phone?  No   Are you deaf or do you have serious difficulty hearing?  No   Do you need help with transportation? No   Do you need help shopping? No   Do you need help preparing meals?  No   Do you need help with housework?  No   Do you need help with laundry? No   Do you need help taking your medications? No   Do you need help managing money? No   Do you ever drive or ride in a car without wearing a seat belt? No   Have  you felt unusual stress, anger or loneliness in the last month? No   Who do you live with? Spouse   If you need help, do you have trouble finding someone available to you? No   Have you been bothered in the last four weeks by sexual problems? No   Do you have difficulty concentrating, remembering or making decisions? No       Age-appropriate Screening Schedule:  Refer to the list below for future screening recommendations based on patient's age, sex and/or medical conditions. Orders for these recommended tests are listed in the plan section. The patient has been provided with a written plan.    Health Maintenance   Topic Date Due   • DXA SCAN  Never done   • INFLUENZA VACCINE  08/01/2022   • LIPID PANEL  02/03/2024   • MAMMOGRAM  04/11/2024   • TDAP/TD VACCINES (2 - Td or Tdap) 07/13/2027   • PAP SMEAR  Discontinued   • ZOSTER VACCINE  Discontinued                CMS Preventative Services Quick Reference  Risk Factors Identified During Encounter  Chronic Pain: seeing pain and getting ablation of spinal nerve  Fall Risk-High or Moderate: Sit to Stand Exercise Information Shared in After Visit Summary  The above risks/problems have been discussed with the patient.  Pertinent information has been shared with the patient in the After Visit Summary.  An After Visit Summary and PPPS were made available to the patient.    Follow Up:   Next Medicare Wellness visit to be scheduled in 1 year.       Additional E&M Note during same encounter follows:  Patient has multiple medical problems which are significant and separately identifiable that require additional work above and beyond the Medicare Wellness Visit.      Chief Complaint  Medicare Wellness-subsequent, Hyperlipidemia, and Hypertension    Subjective        HPI  Dian Jefferson is also being seen today for she has been cont to struggle with back and hip pain  She is seeing pain for this  Less actve and weaker because of this paiu and decrease PA  Pt has been taking BP  "meds as prescribed without any problems.  No HA  No episodes of orthostasis  Pt has been taking cholesterol meds as prescribed.  No difficulties with myalgias.   Pt has been doing well with thyroid meds.  Taking as perscribed without any complications           Objective   Vital Signs:  /72 (BP Location: Left arm, Patient Position: Sitting, Cuff Size: Adult)   Pulse 81   Temp 97.9 °F (36.6 °C) (Temporal)   Ht 165.1 cm (65\")   Wt 85.9 kg (189 lb 4.8 oz)   SpO2 98%   BMI 31.50 kg/m²     Physical Exam  Vitals reviewed.   Constitutional:       Appearance: She is well-developed.   HENT:      Head: Normocephalic and atraumatic.      Right Ear: External ear normal.      Left Ear: External ear normal.   Eyes:      Conjunctiva/sclera: Conjunctivae normal.      Pupils: Pupils are equal, round, and reactive to light.   Neck:      Thyroid: No thyromegaly.      Trachea: No tracheal deviation.   Cardiovascular:      Rate and Rhythm: Normal rate and regular rhythm.      Heart sounds: Normal heart sounds.   Pulmonary:      Effort: Pulmonary effort is normal.      Breath sounds: Normal breath sounds.   Abdominal:      General: Bowel sounds are normal. There is no distension.      Palpations: Abdomen is soft.      Tenderness: There is no abdominal tenderness.   Musculoskeletal:         General: No deformity. Normal range of motion.      Cervical back: Normal range of motion.   Skin:     General: Skin is warm and dry.   Neurological:      Mental Status: She is alert and oriented to person, place, and time.   Psychiatric:         Behavior: Behavior normal.         Thought Content: Thought content normal.         Judgment: Judgment normal.                         Assessment and Plan   Diagnoses and all orders for this visit:    1. Medicare annual wellness visit, subsequent (Primary)    2. Essential hypertension    3. Hyperlipidemia, unspecified hyperlipidemia type    4. Gastroesophageal reflux disease, unspecified whether " esophagitis present             Follow Up   No follow-ups on file.  Patient was given instructions and counseling regarding her condition or for health maintenance advice. Please see specific information pulled into the AVS if appropriate.     1.  Chronic back pain-  Seeing Pain management  Discussed PT  She says that did not really helps  Rec sit to stand exercises.  We can try lidoderm patches and gentle   2.  Leg cramps-  She has been taking magnesium  And mag lotion and it helps some  rec stay well hydrated and stretching  3.  Hypothyroism- ok with current meds  4.  HPL-  Ok with current med

## 2023-02-14 DIAGNOSIS — E78.5 HYPERLIPIDEMIA, UNSPECIFIED HYPERLIPIDEMIA TYPE: ICD-10-CM

## 2023-02-14 RX ORDER — LISINOPRIL 20 MG/1
TABLET ORAL
Qty: 90 TABLET | Refills: 0 | OUTPATIENT
Start: 2023-02-14

## 2023-02-14 RX ORDER — ROSUVASTATIN CALCIUM 40 MG/1
TABLET, COATED ORAL
Qty: 90 TABLET | Refills: 0 | OUTPATIENT
Start: 2023-02-14

## 2023-03-07 ENCOUNTER — OFFICE VISIT (OUTPATIENT)
Dept: INTERNAL MEDICINE | Facility: CLINIC | Age: 73
End: 2023-03-07
Payer: MEDICARE

## 2023-03-07 VITALS
BODY MASS INDEX: 31.82 KG/M2 | OXYGEN SATURATION: 98 % | WEIGHT: 191 LBS | HEIGHT: 65 IN | TEMPERATURE: 97.7 F | DIASTOLIC BLOOD PRESSURE: 70 MMHG | SYSTOLIC BLOOD PRESSURE: 102 MMHG | HEART RATE: 72 BPM

## 2023-03-07 DIAGNOSIS — J01.01 ACUTE RECURRENT MAXILLARY SINUSITIS: Primary | ICD-10-CM

## 2023-03-07 DIAGNOSIS — J30.9 ALLERGIC RHINITIS, UNSPECIFIED SEASONALITY, UNSPECIFIED TRIGGER: ICD-10-CM

## 2023-03-07 PROCEDURE — 99213 OFFICE O/P EST LOW 20 MIN: CPT | Performed by: NURSE PRACTITIONER

## 2023-03-07 RX ORDER — AMOXICILLIN 875 MG/1
875 TABLET, COATED ORAL 2 TIMES DAILY
Qty: 20 TABLET | Refills: 0 | Status: SHIPPED | OUTPATIENT
Start: 2023-03-07

## 2023-03-07 RX ORDER — MONTELUKAST SODIUM 10 MG/1
10 TABLET ORAL NIGHTLY
Qty: 30 TABLET | Refills: 3 | Status: SHIPPED | OUTPATIENT
Start: 2023-03-07

## 2023-03-07 NOTE — PATIENT INSTRUCTIONS
1. Sinusitis- will treat amoxil, mucinex, flonase, hydrate well, hot compresses, vicks  Will send in montelukast  Probiotic and rest

## 2023-03-07 NOTE — PROGRESS NOTES
Subjective   Dian Jefferson is a 73 y.o. female.     History of Present Illness    The patient is here today with c/o being sick a little over a week. She started with a cough but now feels pain in her sinuses and chest congestion.  is sick too with the same symptoms. She does have allergies around this time of year.   Typically needs something this time of year.   The following portions of the patient's history were reviewed and updated as appropriate: allergies, current medications, past family history, past medical history, past social history, past surgical history and problem list.    Review of Systems   Constitutional: Positive for fatigue. Negative for chills and fever.   HENT: Positive for postnasal drip, rhinorrhea, sinus pressure and voice change (started yesterday ). Negative for ear pain and sore throat.    Respiratory: Positive for cough (productive, yellow). Negative for shortness of breath and wheezing.    Cardiovascular: Positive for chest pain (with coughing ). Negative for palpitations and leg swelling.   Musculoskeletal: Negative for myalgias.       Objective   Physical Exam  Constitutional:       Appearance: Normal appearance. She is well-developed.   HENT:      Right Ear: Hearing, tympanic membrane, ear canal and external ear normal.      Left Ear: Hearing, tympanic membrane, ear canal and external ear normal.      Nose:      Right Sinus: Maxillary sinus tenderness present. No frontal sinus tenderness.      Left Sinus: Maxillary sinus tenderness present. No frontal sinus tenderness.      Mouth/Throat:      Lips: Pink.      Mouth: Mucous membranes are moist.      Pharynx: Oropharynx is clear.      Tonsils: No tonsillar exudate. 0 on the right. 0 on the left.   Neck:      Thyroid: No thyromegaly.   Cardiovascular:      Rate and Rhythm: Normal rate and regular rhythm.      Heart sounds: Normal heart sounds.   Pulmonary:      Effort: Pulmonary effort is normal.      Breath sounds: Normal  breath sounds.   Musculoskeletal:      Cervical back: Normal range of motion and neck supple.   Lymphadenopathy:      Cervical: No cervical adenopathy.   Skin:     General: Skin is warm and dry.   Neurological:      Mental Status: She is alert.   Psychiatric:         Behavior: Behavior normal.         Thought Content: Thought content normal.         Judgment: Judgment normal.         Vitals:    03/07/23 1433   BP: 102/70   Pulse: 72   Temp: 97.7 °F (36.5 °C)   SpO2: 98%     Body mass index is 31.78 kg/m².    Current Outpatient Medications:   •  baclofen (LIORESAL) 10 MG tablet, Take 1 tablet by mouth 3 (Three) Times a Day., Disp: 30 tablet, Rfl: 0  •  cetirizine (ZyrTEC) 10 MG tablet, Take 1 tablet by mouth Daily., Disp: , Rfl:   •  Cholecalciferol (VITAMIN D3) 2000 UNITS capsule, Take 1 capsule by mouth Daily., Disp: , Rfl:   •  ezetimibe (ZETIA) 10 MG tablet, Take 1 tablet by mouth once daily, Disp: 90 tablet, Rfl: 1  •  lidocaine (XYLOCAINE) 5 % ointment, Apply 1 application topically to the appropriate area as directed Every 2 (Two) Hours As Needed for Mild Pain., Disp: 30 each, Rfl: 3  •  lisinopril (PRINIVIL,ZESTRIL) 20 MG tablet, Take 1 tablet by mouth once daily, Disp: 90 tablet, Rfl: 1  •  meloxicam (Mobic) 7.5 MG tablet, 1-2 tabs daily as needed, Disp: 30 tablet, Rfl: 2  •  montelukast (Singulair) 10 MG tablet, Take 1 tablet by mouth Every Night., Disp: 30 tablet, Rfl: 3  •  Multiple Vitamins-Minerals (Airborne Gummies) chewable tablet, Chew., Disp: , Rfl:   •  rosuvastatin (CRESTOR) 40 MG tablet, TAKE 1 TABLET BY MOUTH ONCE DAILY AT NIGHT, Disp: 90 tablet, Rfl: 1  •  Synthroid 25 MCG tablet, Take 1 tablet by mouth once daily, Disp: 90 tablet, Rfl: 1  •  vilazodone (Viibryd) 40 MG tablet tablet, Take 1 tablet by mouth Daily., Disp: 90 tablet, Rfl: 1  •  YUVAFEM 10 MCG tablet vaginal tablet, Insert 1 tablet into the vagina 2 (Two) Times a Week., Disp: 24 tablet, Rfl: 1  •  amoxicillin (AMOXIL) 875 MG  tablet, Take 1 tablet by mouth 2 (Two) Times a Day., Disp: 20 tablet, Rfl: 0    Assessment & Plan   Diagnoses and all orders for this visit:    1. Acute recurrent maxillary sinusitis (Primary)  -     amoxicillin (AMOXIL) 875 MG tablet; Take 1 tablet by mouth 2 (Two) Times a Day.  Dispense: 20 tablet; Refill: 0    2. Allergic rhinitis, unspecified seasonality, unspecified trigger  -     montelukast (Singulair) 10 MG tablet; Take 1 tablet by mouth Every Night.  Dispense: 30 tablet; Refill: 3                 1. Sinusitis- will treat amoxil, mucinex, flonase, hydrate well, hot compresses, vicks  Will send in montelukast  Probiotic and rest

## 2023-03-21 RX ORDER — VILAZODONE HYDROCHLORIDE 40 MG/1
TABLET ORAL
Qty: 90 TABLET | Refills: 1 | Status: SHIPPED | OUTPATIENT
Start: 2023-03-21

## 2023-03-23 RX ORDER — LEVOTHYROXINE SODIUM 25 MCG
TABLET ORAL
Qty: 90 TABLET | Refills: 1 | Status: SHIPPED | OUTPATIENT
Start: 2023-03-23

## 2023-04-12 ENCOUNTER — APPOINTMENT (OUTPATIENT)
Dept: WOMENS IMAGING | Facility: HOSPITAL | Age: 73
End: 2023-04-12
Payer: MEDICARE

## 2023-04-12 PROCEDURE — 77063 BREAST TOMOSYNTHESIS BI: CPT | Performed by: RADIOLOGY

## 2023-04-12 PROCEDURE — 77067 SCR MAMMO BI INCL CAD: CPT | Performed by: RADIOLOGY

## 2023-05-08 DIAGNOSIS — E78.5 HYPERLIPIDEMIA, UNSPECIFIED HYPERLIPIDEMIA TYPE: ICD-10-CM

## 2023-05-09 RX ORDER — ROSUVASTATIN CALCIUM 40 MG/1
TABLET, COATED ORAL
Qty: 90 TABLET | Refills: 0 | OUTPATIENT
Start: 2023-05-09

## 2023-05-18 DIAGNOSIS — E78.5 HYPERLIPIDEMIA, UNSPECIFIED HYPERLIPIDEMIA TYPE: ICD-10-CM

## 2023-05-19 RX ORDER — ROSUVASTATIN CALCIUM 40 MG/1
TABLET, COATED ORAL
Qty: 90 TABLET | Refills: 1 | Status: SHIPPED | OUTPATIENT
Start: 2023-05-19

## 2023-05-19 RX ORDER — LISINOPRIL 20 MG/1
TABLET ORAL
Qty: 90 TABLET | Refills: 1 | Status: SHIPPED | OUTPATIENT
Start: 2023-05-19

## 2023-08-02 DIAGNOSIS — R73.09 ELEVATED GLUCOSE: ICD-10-CM

## 2023-08-02 DIAGNOSIS — I10 ESSENTIAL HYPERTENSION: ICD-10-CM

## 2023-08-02 DIAGNOSIS — E78.5 HYPERLIPIDEMIA, UNSPECIFIED HYPERLIPIDEMIA TYPE: ICD-10-CM

## 2023-08-04 LAB
ALBUMIN SERPL-MCNC: 4.6 G/DL (ref 3.5–5.2)
ALBUMIN/GLOB SERPL: 2.4 G/DL
ALP SERPL-CCNC: 55 U/L (ref 39–117)
ALT SERPL-CCNC: 43 U/L (ref 1–33)
AST SERPL-CCNC: 26 U/L (ref 1–32)
BASOPHILS # BLD AUTO: 0.02 10*3/MM3 (ref 0–0.2)
BASOPHILS NFR BLD AUTO: 0.4 % (ref 0–1.5)
BILIRUB SERPL-MCNC: 0.5 MG/DL (ref 0–1.2)
BUN SERPL-MCNC: 12 MG/DL (ref 8–23)
BUN/CREAT SERPL: 12.5 (ref 7–25)
CALCIUM SERPL-MCNC: 9.8 MG/DL (ref 8.6–10.5)
CHLORIDE SERPL-SCNC: 108 MMOL/L (ref 98–107)
CHOLEST SERPL-MCNC: 164 MG/DL (ref 0–200)
CO2 SERPL-SCNC: 22.8 MMOL/L (ref 22–29)
CREAT SERPL-MCNC: 0.96 MG/DL (ref 0.57–1)
EGFRCR SERPLBLD CKD-EPI 2021: 63.4 ML/MIN/1.73
EOSINOPHIL # BLD AUTO: 0.13 10*3/MM3 (ref 0–0.4)
EOSINOPHIL NFR BLD AUTO: 2.5 % (ref 0.3–6.2)
ERYTHROCYTE [DISTWIDTH] IN BLOOD BY AUTOMATED COUNT: 12.9 % (ref 12.3–15.4)
GLOBULIN SER CALC-MCNC: 1.9 GM/DL
GLUCOSE SERPL-MCNC: 109 MG/DL (ref 65–99)
HBA1C MFR BLD: 5.9 % (ref 4.8–5.6)
HCT VFR BLD AUTO: 39.6 % (ref 34–46.6)
HDLC SERPL-MCNC: 49 MG/DL (ref 40–60)
HGB BLD-MCNC: 13.1 G/DL (ref 12–15.9)
IMM GRANULOCYTES # BLD AUTO: 0.01 10*3/MM3 (ref 0–0.05)
IMM GRANULOCYTES NFR BLD AUTO: 0.2 % (ref 0–0.5)
LDLC SERPL CALC-MCNC: 82 MG/DL (ref 0–100)
LDLC/HDLC SERPL: 1.55 {RATIO}
LYMPHOCYTES # BLD AUTO: 1.52 10*3/MM3 (ref 0.7–3.1)
LYMPHOCYTES NFR BLD AUTO: 29.7 % (ref 19.6–45.3)
MCH RBC QN AUTO: 29.9 PG (ref 26.6–33)
MCHC RBC AUTO-ENTMCNC: 33.1 G/DL (ref 31.5–35.7)
MCV RBC AUTO: 90.4 FL (ref 79–97)
MONOCYTES # BLD AUTO: 0.35 10*3/MM3 (ref 0.1–0.9)
MONOCYTES NFR BLD AUTO: 6.8 % (ref 5–12)
NEUTROPHILS # BLD AUTO: 3.09 10*3/MM3 (ref 1.7–7)
NEUTROPHILS NFR BLD AUTO: 60.4 % (ref 42.7–76)
NRBC BLD AUTO-RTO: 0 /100 WBC (ref 0–0.2)
PLATELET # BLD AUTO: 302 10*3/MM3 (ref 140–450)
POTASSIUM SERPL-SCNC: 4.2 MMOL/L (ref 3.5–5.2)
PROT SERPL-MCNC: 6.5 G/DL (ref 6–8.5)
RBC # BLD AUTO: 4.38 10*6/MM3 (ref 3.77–5.28)
SODIUM SERPL-SCNC: 142 MMOL/L (ref 136–145)
TRIGL SERPL-MCNC: 196 MG/DL (ref 0–150)
TSH SERPL DL<=0.005 MIU/L-ACNC: 1.85 UIU/ML (ref 0.27–4.2)
VLDLC SERPL CALC-MCNC: 33 MG/DL (ref 5–40)
WBC # BLD AUTO: 5.12 10*3/MM3 (ref 3.4–10.8)

## 2023-08-10 ENCOUNTER — OFFICE VISIT (OUTPATIENT)
Dept: INTERNAL MEDICINE | Facility: CLINIC | Age: 73
End: 2023-08-10
Payer: MEDICARE

## 2023-08-10 ENCOUNTER — TELEPHONE (OUTPATIENT)
Dept: INTERNAL MEDICINE | Facility: CLINIC | Age: 73
End: 2023-08-10

## 2023-08-10 VITALS
WEIGHT: 186.8 LBS | HEART RATE: 76 BPM | HEIGHT: 65 IN | DIASTOLIC BLOOD PRESSURE: 70 MMHG | SYSTOLIC BLOOD PRESSURE: 100 MMHG | BODY MASS INDEX: 31.12 KG/M2 | TEMPERATURE: 98.2 F | OXYGEN SATURATION: 98 %

## 2023-08-10 DIAGNOSIS — E78.5 HYPERLIPIDEMIA, UNSPECIFIED HYPERLIPIDEMIA TYPE: ICD-10-CM

## 2023-08-10 DIAGNOSIS — I10 ESSENTIAL HYPERTENSION: Primary | ICD-10-CM

## 2023-08-10 DIAGNOSIS — M54.50 CHRONIC BILATERAL LOW BACK PAIN WITHOUT SCIATICA: ICD-10-CM

## 2023-08-10 DIAGNOSIS — G89.29 CHRONIC BILATERAL LOW BACK PAIN WITHOUT SCIATICA: ICD-10-CM

## 2023-08-10 DIAGNOSIS — Z78.0 POST-MENOPAUSAL: ICD-10-CM

## 2023-08-10 DIAGNOSIS — E03.9 ACQUIRED HYPOTHYROIDISM: ICD-10-CM

## 2023-08-10 PROCEDURE — 99214 OFFICE O/P EST MOD 30 MIN: CPT | Performed by: INTERNAL MEDICINE

## 2023-08-10 PROCEDURE — 3078F DIAST BP <80 MM HG: CPT | Performed by: INTERNAL MEDICINE

## 2023-08-10 PROCEDURE — 1160F RVW MEDS BY RX/DR IN RCRD: CPT | Performed by: INTERNAL MEDICINE

## 2023-08-10 PROCEDURE — 3074F SYST BP LT 130 MM HG: CPT | Performed by: INTERNAL MEDICINE

## 2023-08-10 PROCEDURE — 1159F MED LIST DOCD IN RCRD: CPT | Performed by: INTERNAL MEDICINE

## 2023-08-10 RX ORDER — LISINOPRIL 10 MG/1
10 TABLET ORAL DAILY
Qty: 90 TABLET | Refills: 1 | Status: SHIPPED | OUTPATIENT
Start: 2023-08-10

## 2023-08-10 NOTE — PROGRESS NOTES
Subjective   Dian Jefferson is a 73 y.o. female.   Fu with FL    Hyperlipidemia    Hypertension  Associated symptoms include anxiety.   Anxiety      Her past medical history is significant for depression.   Depression   Pt has been taking cholesterol meds as prescribed.  No difficulties with myalgias.   Pt has been doing well with thyroid meds.  Taking as perscribed without any complications  She has been a little low with BP  occas tavon headed  Doing well with vybriid   SHe says the chornincback pain is worse  she has a fu with pain    The following portions of the patient's history were reviewed and updated as appropriate: allergies, current medications, past family history, past medical history, past social history, past surgical history, and problem list.    Review of Systems    Objective   Physical Exam  Vitals reviewed.   Constitutional:       Appearance: She is well-developed.   HENT:      Head: Normocephalic and atraumatic.      Right Ear: External ear normal.      Left Ear: External ear normal.   Eyes:      Conjunctiva/sclera: Conjunctivae normal.      Pupils: Pupils are equal, round, and reactive to light.   Neck:      Thyroid: No thyromegaly.      Trachea: No tracheal deviation.   Cardiovascular:      Rate and Rhythm: Normal rate and regular rhythm.      Heart sounds: Normal heart sounds.   Pulmonary:      Effort: Pulmonary effort is normal.      Breath sounds: Normal breath sounds.   Abdominal:      General: Bowel sounds are normal. There is no distension.      Palpations: Abdomen is soft.      Tenderness: There is no abdominal tenderness.   Musculoskeletal:         General: No deformity. Normal range of motion.      Cervical back: Normal range of motion.   Skin:     General: Skin is warm and dry.   Neurological:      Mental Status: She is alert and oriented to person, place, and time.   Psychiatric:         Behavior: Behavior normal.         Thought Content: Thought content normal.         Judgment:  Judgment normal.       Vitals:    08/10/23 1413   BP: 100/70   Pulse: 76   Temp: 98.2 øF (36.8 øC)   SpO2: 98%     Body mass index is 31.09 kg/mý.         Assessment & Plan   Diagnoses and all orders for this visit:    1. Essential hypertension (Primary)    2. Hyperlipidemia, unspecified hyperlipidemia type    3. Acquired hypothyroidism    4. Chronic bilateral low back pain without sciatica    5. Post-menopausal  -     DEXA Bone Density Axial; Future    Other orders  -     lisinopril (PRINIVIL,ZESTRIL) 10 MG tablet; Take 1 tablet by mouth Daily. D/c the 20mg dose  Dispense: 90 tablet; Refill: 1       HTN-  decrease the lisinopril to 10mg and see how she does  Hypothyroidism-  ok with current meds  HPL-  ok with crestor  Back pain-  she is going to see pain doctor

## 2023-08-10 NOTE — TELEPHONE ENCOUNTER
"She has been having more freq Bm  no NV no abd pain.  No relation to foods.  I have rec she try a probiotic. And holding the body armour lite  and if that helps      ----- Message from Charo Ibarra MA sent at 8/10/2023  4:38 PM EDT -----  Regarding: FW: Issue I forgot to discuss with you during today's appointment   Contact: [phone]    ----- Message -----  From: Dian Jefferson \"ODELL\"  Sent: 8/10/2023   4:32 PM EDT  To: Shaan Mohamud [place] [id number] [facility]  Subject: Issue I forgot to discuss with you during to#    Dr Stewart, I forgot to discuss with you that I'm having more frequent (4-5) bowel movements a day, normal to soft stool, no blood. It seems that anytime after eating or drinking anything, I have the urge to use the toilet. Sometimes it's only gas.  Also, I'm having increased amount of gas and some bloating.   Is there anything type of otc meds I can take to help with this? My daughter is concerned that I might have a gallbladder problem.   Thank you.   Odell Jefferson    "

## 2023-08-17 ENCOUNTER — HOSPITAL ENCOUNTER (OUTPATIENT)
Dept: GENERAL RADIOLOGY | Facility: HOSPITAL | Age: 73
Discharge: HOME | End: 2023-08-17
Admitting: NURSE PRACTITIONER
Payer: MEDICARE

## 2023-08-17 ENCOUNTER — PREP FOR SURGERY (OUTPATIENT)
Dept: SURGERY | Facility: SURGERY CENTER | Age: 73
End: 2023-08-17
Payer: MEDICARE

## 2023-08-17 ENCOUNTER — OFFICE VISIT (OUTPATIENT)
Dept: PAIN MEDICINE | Facility: CLINIC | Age: 73
End: 2023-08-17
Payer: MEDICARE

## 2023-08-17 VITALS
BODY MASS INDEX: 31.06 KG/M2 | WEIGHT: 186.4 LBS | SYSTOLIC BLOOD PRESSURE: 110 MMHG | TEMPERATURE: 97 F | HEIGHT: 65 IN | OXYGEN SATURATION: 98 % | DIASTOLIC BLOOD PRESSURE: 82 MMHG | RESPIRATION RATE: 18 BRPM | HEART RATE: 72 BPM

## 2023-08-17 DIAGNOSIS — M25.551 BILATERAL HIP PAIN: ICD-10-CM

## 2023-08-17 DIAGNOSIS — M47.816 LUMBAR FACET ARTHROPATHY: Primary | ICD-10-CM

## 2023-08-17 DIAGNOSIS — M25.552 BILATERAL HIP PAIN: ICD-10-CM

## 2023-08-17 DIAGNOSIS — G89.29 CHRONIC BILATERAL LOW BACK PAIN WITHOUT SCIATICA: Primary | ICD-10-CM

## 2023-08-17 DIAGNOSIS — M54.50 CHRONIC BILATERAL LOW BACK PAIN WITHOUT SCIATICA: Primary | ICD-10-CM

## 2023-08-17 PROCEDURE — 3074F SYST BP LT 130 MM HG: CPT | Performed by: NURSE PRACTITIONER

## 2023-08-17 PROCEDURE — 73521 X-RAY EXAM HIPS BI 2 VIEWS: CPT

## 2023-08-17 PROCEDURE — 1125F AMNT PAIN NOTED PAIN PRSNT: CPT | Performed by: NURSE PRACTITIONER

## 2023-08-17 PROCEDURE — 3079F DIAST BP 80-89 MM HG: CPT | Performed by: NURSE PRACTITIONER

## 2023-08-17 PROCEDURE — 1159F MED LIST DOCD IN RCRD: CPT | Performed by: NURSE PRACTITIONER

## 2023-08-17 PROCEDURE — 99214 OFFICE O/P EST MOD 30 MIN: CPT | Performed by: NURSE PRACTITIONER

## 2023-08-17 PROCEDURE — 1160F RVW MEDS BY RX/DR IN RCRD: CPT | Performed by: NURSE PRACTITIONER

## 2023-08-17 RX ORDER — SODIUM CHLORIDE 0.9 % (FLUSH) 0.9 %
10 SYRINGE (ML) INJECTION AS NEEDED
OUTPATIENT
Start: 2023-08-17

## 2023-08-17 RX ORDER — SODIUM CHLORIDE 0.9 % (FLUSH) 0.9 %
10 SYRINGE (ML) INJECTION EVERY 12 HOURS SCHEDULED
OUTPATIENT
Start: 2023-08-17

## 2023-08-17 NOTE — H&P (VIEW-ONLY)
CHIEF COMPLAINT  Back pain    Subjective   Dian Jefferson is a 73 y.o. female  who presents to the office for follow-up of procedure.  She completed a LEFT SACROILIAC INJECTION    on  12/7/22 performed by Dr. Mirza for management of back pain. Patient reports 80% relief from the procedure lasting 3-4 months.    Procedures ---   Bilateral GT bursa injection on 10/11/2022 --- 85% relief which is mostly ongoing  Radiofrequency ablation of bilateral L2-L5 on  3/30/2022 --- 50% relief x greater than one year     Bilateral hip pain worsening. Says it feels like grinding while walking.  Occasional anterior pain.      Takes Meloxicam and Baclofen     Back Pain  This is a chronic problem. The current episode started more than 1 month ago. The problem occurs daily. The problem has been waxing and waning since onset. The pain is present in the lumbar spine and sacro-iliac. The quality of the pain is described as aching and burning. The pain does not radiate. The pain is at a severity of 4/10. The symptoms are aggravated by standing (walking). Associated symptoms include weakness (back). Pertinent negatives include no abdominal pain, chest pain, dysuria, fever, headaches or numbness. She has tried analgesics (rest, laying down flat) for the symptoms. The treatment provided mild relief.      PEG Assessment   What number best describes your pain on average in the past week?10  What number best describes how, during the past week, pain has interfered with your enjoyment of life?8  What number best describes how, during the past week, pain has interfered with your general activity?  8    Review of Pertinent Medical Data ---  MRI LUMBAR SPINE      The following portions of the patient's history were reviewed and updated as appropriate: allergies, current medications, past family history, past medical history, past social history, past surgical history, and problem list.    Review of Systems   Constitutional:  Positive for activity  "change. Negative for fatigue and fever.   Cardiovascular:  Negative for chest pain.   Gastrointestinal:  Negative for abdominal pain, constipation and diarrhea.   Genitourinary:  Negative for difficulty urinating and dysuria.   Musculoskeletal:  Positive for back pain.   Neurological:  Positive for weakness (back). Negative for dizziness, light-headedness, numbness and headaches.   Psychiatric/Behavioral:  Positive for agitation. Negative for sleep disturbance and suicidal ideas. The patient is not nervous/anxious.      I have reviewed and confirmed the accuracy of the ROS as documented by the MA/LPN/RN LIZABETH Rico     Vitals:    08/17/23 1523   BP: 110/82   BP Location: Left arm   Patient Position: Sitting   Cuff Size: Adult   Pulse: 72   Resp: 18   Temp: 97 °F (36.1 °C)   SpO2: 98%   Weight: 84.6 kg (186 lb 6.4 oz)   Height: 165.1 cm (65\")   PainSc:   4         Objective   Physical Exam  Vitals and nursing note reviewed.   Constitutional:       General: She is not in acute distress.     Appearance: Normal appearance. She is not ill-appearing.   Pulmonary:      Effort: Pulmonary effort is normal. No respiratory distress.   Musculoskeletal:      Lumbar back: Tenderness and bony tenderness present. Negative right straight leg raise test and negative left straight leg raise test.      Right hip: Bony tenderness present. Decreased range of motion.      Left hip: Bony tenderness present. Decreased range of motion.      Comments: +lumbar facet tenderness/loading    Neurological:      Mental Status: She is alert and oriented to person, place, and time.      Motor: Motor function is intact.      Gait: Gait normal.   Psychiatric:         Mood and Affect: Mood normal.         Behavior: Behavior normal.     Assessment & Plan   Diagnoses and all orders for this visit:    1. Chronic bilateral low back pain without sciatica (Primary)    2. Bilateral hip pain  -     XR Hips Bilateral With or Without Pelvis 2 View; " Future      --- Xray bilateral hips.  Consider intra-articular hip injections versus orthopedic eval.    --- bilateral L2-L5 (L3-S1) RFA (three level procedure warranted due to extensive facet arthropathy seen on imaging which patient is symptomatic from)    Thermal Radiofrequency Destruction    This repeat thermal radiofrequency destruction (RFA) of cervical, thoracic, or lumbar paravertebral facet joint (medial branch) nerves at the same anatomical site is considered medically reasonable and necessary as this patient has met the criteria of having a minimum of consistent 50% improvement in pain for at least six (6) months or at least 50% consistent improvement in the ability to perform previously painful movements and ADLs as compared to baseline measurement using the same scale.    Reviewed the procedure at length with the patient.  Included in the review was expectations, complications, risk and benefits.The procedure was described in detail and the risks, benefits and alternatives were discussed with the patient (including but not limited to: bleeding, infection, nerve damage, worsening of pain, inability to perform injection, paralysis, seizures, coma, no pain relief and death) who agreed to proceed.  Discussed the potential for sedation if warranted/wanted.  The procedure will plan to be performed at [facility] with fluoroscopic guidance(unless ultrasound is indicated) and could potentially have steroids and contrast dye used. Questions were answered and in a way the patient could understand.  Patient verbalized understanding and wishes to proceed.  This intervention will be ordered.  Discussed with patient that all procedures are part of a multimodal plan of care and include either formal PT or a home exercise program.  Patient has no evidence of coagulopathy or current infection.        Dian Jefferson reports a pain score of 4.  Given her pain assessment as noted, treatment options were  discussed and the following options were decided upon as a follow-up plan to address the patient's pain:  see plan above .      --- Follow-up for RFA and 6 weeks post procedure                   Dictated utilizing Dragon dictation.

## 2023-08-17 NOTE — PROGRESS NOTES
CHIEF COMPLAINT  Back pain    Subjective   Dian Jefferson is a 73 y.o. female  who presents to the office for follow-up of procedure.  She completed a LEFT SACROILIAC INJECTION    on  12/7/22 performed by Dr. Mirza for management of back pain. Patient reports 80% relief from the procedure lasting 3-4 months.    Procedures ---   Bilateral GT bursa injection on 10/11/2022 --- 85% relief which is mostly ongoing  Radiofrequency ablation of bilateral L2-L5 on  3/30/2022 --- 50% relief x greater than one year     Bilateral hip pain worsening. Says it feels like grinding while walking.  Occasional anterior pain.      Takes Meloxicam and Baclofen     Back Pain  This is a chronic problem. The current episode started more than 1 month ago. The problem occurs daily. The problem has been waxing and waning since onset. The pain is present in the lumbar spine and sacro-iliac. The quality of the pain is described as aching and burning. The pain does not radiate. The pain is at a severity of 4/10. The symptoms are aggravated by standing (walking). Associated symptoms include weakness (back). Pertinent negatives include no abdominal pain, chest pain, dysuria, fever, headaches or numbness. She has tried analgesics (rest, laying down flat) for the symptoms. The treatment provided mild relief.      PEG Assessment   What number best describes your pain on average in the past week?10  What number best describes how, during the past week, pain has interfered with your enjoyment of life?8  What number best describes how, during the past week, pain has interfered with your general activity?  8    Review of Pertinent Medical Data ---  MRI LUMBAR SPINE      The following portions of the patient's history were reviewed and updated as appropriate: allergies, current medications, past family history, past medical history, past social history, past surgical history, and problem list.    Review of Systems   Constitutional:  Positive for activity  "change. Negative for fatigue and fever.   Cardiovascular:  Negative for chest pain.   Gastrointestinal:  Negative for abdominal pain, constipation and diarrhea.   Genitourinary:  Negative for difficulty urinating and dysuria.   Musculoskeletal:  Positive for back pain.   Neurological:  Positive for weakness (back). Negative for dizziness, light-headedness, numbness and headaches.   Psychiatric/Behavioral:  Positive for agitation. Negative for sleep disturbance and suicidal ideas. The patient is not nervous/anxious.      I have reviewed and confirmed the accuracy of the ROS as documented by the MA/LPN/RN LIZABETH Rico     Vitals:    08/17/23 1523   BP: 110/82   BP Location: Left arm   Patient Position: Sitting   Cuff Size: Adult   Pulse: 72   Resp: 18   Temp: 97 øF (36.1 øC)   SpO2: 98%   Weight: 84.6 kg (186 lb 6.4 oz)   Height: 165.1 cm (65\")   PainSc:   4         Objective   Physical Exam  Vitals and nursing note reviewed.   Constitutional:       General: She is not in acute distress.     Appearance: Normal appearance. She is not ill-appearing.   Pulmonary:      Effort: Pulmonary effort is normal. No respiratory distress.   Musculoskeletal:      Lumbar back: Tenderness and bony tenderness present. Negative right straight leg raise test and negative left straight leg raise test.      Right hip: Bony tenderness present. Decreased range of motion.      Left hip: Bony tenderness present. Decreased range of motion.      Comments: +lumbar facet tenderness/loading    Neurological:      Mental Status: She is alert and oriented to person, place, and time.      Motor: Motor function is intact.      Gait: Gait normal.   Psychiatric:         Mood and Affect: Mood normal.         Behavior: Behavior normal.     Assessment & Plan   Diagnoses and all orders for this visit:    1. Chronic bilateral low back pain without sciatica (Primary)    2. Bilateral hip pain  -     XR Hips Bilateral With or Without Pelvis 2 View; " Future      --- Xray bilateral hips.  Consider intra-articular hip injections versus orthopedic eval.    --- bilateral L2-L5 (L3-S1) RFA (three level procedure warranted due to extensive facet arthropathy seen on imaging which patient is symptomatic from)    Thermal Radiofrequency Destruction    This repeat thermal radiofrequency destruction (RFA) of cervical, thoracic, or lumbar paravertebral facet joint (medial branch) nerves at the same anatomical site is considered medically reasonable and necessary as this patient has met the criteria of having a minimum of consistent 50% improvement in pain for at least six (6) months or at least 50% consistent improvement in the ability to perform previously painful movements and ADLs as compared to baseline measurement using the same scale.    Reviewed the procedure at length with the patient.  Included in the review was expectations, complications, risk and benefits.The procedure was described in detail and the risks, benefits and alternatives were discussed with the patient (including but not limited to: bleeding, infection, nerve damage, worsening of pain, inability to perform injection, paralysis, seizures, coma, no pain relief and death) who agreed to proceed.  Discussed the potential for sedation if warranted/wanted.  The procedure will plan to be performed at [facility] with fluoroscopic guidance(unless ultrasound is indicated) and could potentially have steroids and contrast dye used. Questions were answered and in a way the patient could understand.  Patient verbalized understanding and wishes to proceed.  This intervention will be ordered.  Discussed with patient that all procedures are part of a multimodal plan of care and include either formal PT or a home exercise program.  Patient has no evidence of coagulopathy or current infection.        Dian Jefferson reports a pain score of 4.  Given her pain assessment as noted, treatment options were  discussed and the following options were decided upon as a follow-up plan to address the patient's pain:  see plan above .      --- Follow-up for RFA and 6 weeks post procedure                   Dictated utilizing Dragon dictation.

## 2023-08-18 NOTE — PROGRESS NOTES
Let her know her hips look good. Minimal arthritis. Hip pain likely just recurrence of bursitis. We can schedule her to come in the office for bilateral GT bursa injections if she would like.

## 2023-08-24 ENCOUNTER — PROCEDURE VISIT (OUTPATIENT)
Dept: PAIN MEDICINE | Facility: CLINIC | Age: 73
End: 2023-08-24
Payer: MEDICARE

## 2023-08-24 VITALS
DIASTOLIC BLOOD PRESSURE: 70 MMHG | BODY MASS INDEX: 31.12 KG/M2 | RESPIRATION RATE: 12 BRPM | OXYGEN SATURATION: 98 % | SYSTOLIC BLOOD PRESSURE: 105 MMHG | HEIGHT: 65 IN | TEMPERATURE: 96.2 F | HEART RATE: 71 BPM | WEIGHT: 186.8 LBS

## 2023-08-24 DIAGNOSIS — M70.61 TROCHANTERIC BURSITIS OF BOTH HIPS: Primary | ICD-10-CM

## 2023-08-24 DIAGNOSIS — M70.62 TROCHANTERIC BURSITIS OF BOTH HIPS: Primary | ICD-10-CM

## 2023-08-24 NOTE — PROGRESS NOTES
"CHIEF COMPLAINT: Hip Pain    Dian Jefferson is a 73 y.o. female.  She is here for the following procedure:  bilateral greater trochanteric bursa injection.     Vitals:    08/24/23 1416   BP: 105/70   BP Location: Right arm   Patient Position: Sitting   Cuff Size: Adult   Pulse: 71   Resp: 12   Temp: 96.2 øF (35.7 øC)   TempSrc: Temporal   SpO2: 98%   Weight: 84.7 kg (186 lb 12.8 oz)   Height: 165.1 cm (65\")   PainSc:   5       Bupivacaine 0.25% Lot#: [id number] Exp:   NDC;5547-3284-28  Depo Medrol 40 mg/ml Lot#: [id number] Exp:   NDC;[address]-4      Large Joint Arthrocentesis: R greater trochanteric bursa  Date/Time: 8/24/2023 2:45 PM  Consent given by: patient  Site marked: site marked  Timeout: Immediately prior to procedure a time out was called to verify the correct patient, procedure, equipment, support staff and site/side marked as required   Supporting Documentation  Indications: pain and diagnostic evaluation   Procedure Details  Location: hip - R greater trochanteric bursa  Preparation: Patient was prepped and draped in the usual sterile fashion  Needle size: 25 G  Approach: lateral  Medication group details: 1 ML DepoMedrol + 4 ML Bupivicaine.  Patient tolerance: patient tolerated the procedure well with no immediate complications      Large Joint Arthrocentesis: L greater trochanteric bursa  Date/Time: 8/24/2023 2:45 PM  Consent given by: patient  Site marked: site marked  Timeout: Immediately prior to procedure a time out was called to verify the correct patient, procedure, equipment, support staff and site/side marked as required   Supporting Documentation  Indications: pain and diagnostic evaluation   Procedure Details  Location: hip - L greater trochanteric bursa  Preparation: Patient was prepped and draped in the usual sterile fashion  Needle size: 25 G  Approach: lateral  Medication group details: 1ML DepoMedrol + 4 ML Bupivicaine.  Patient tolerance: patient tolerated the procedure well " with no immediate complications      Visit Diagnoses:  1. Trochanteric bursitis of both hips      F/u as scheduled    LIZABETH Rico  Pain Management

## 2023-08-28 ENCOUNTER — TRANSCRIBE ORDERS (OUTPATIENT)
Dept: SURGERY | Facility: SURGERY CENTER | Age: 73
End: 2023-08-28
Payer: MEDICARE

## 2023-08-28 DIAGNOSIS — Z41.9 SURGERY, ELECTIVE: Primary | ICD-10-CM

## 2023-09-07 RX ORDER — LEVOTHYROXINE SODIUM 25 MCG
TABLET ORAL
Qty: 90 TABLET | Refills: 1 | Status: SHIPPED | OUTPATIENT
Start: 2023-09-07

## 2023-09-08 NOTE — DISCHARGE INSTRUCTIONS
[facility] - Post-procedure Instructions          --  While there are no absolute restrictions, it is recommended that you do not perform strenuous activity today. In the morning, you may resume your level of activity as before your block.    --  If you have a band-aid at your injection site, please remove it later today. Observe the area for any redness, swelling, pus-like drainage, or a temperature over 101°. If any of these symptoms occur, please call your doctor at [phone]. If after office hours, leave a message and the on-call provider will return your call.    --  Ice may be applied to your injection site. It is recommended you avoid direct heat (heating pad; hot tub) for 1-2 days.    --  Call [facility] at [phone] if you experience persistent headache, persistent bleeding from the injection site, or severe pain not relieved by heat or oral medication.    --  Do not make important decisions today.    --  Due to the effects of the block and/or the I.V. Sedation, DO NOT drive or operate hazardous machinery for 12 hours.  Local anesthetics may cause numbness after procedure and precautions must be taken with regards to operating equipment as well as with walking, even if ambulating with assistance of another person or with an assistive device.    --  Do not drink alcohol for 12 hours.    -- You may return to work tomorrow, or as directed by your referring doctor.    --  Occasionally you may notice a slight increase in your pain after the procedure. This should start to improve within the next 24-48 hours. Radiofrequency ablation procedure pain may last 3-4 weeks.    --  It may take as long as 3-4 days before you notice a gradual improvement in your pain and/or other symptoms.    -- You may continue to take your prescribed pain medication as needed.    --  Some normal possible side effects of steroid use could include fluid retention, increased blood sugar, dull headache,  "increased sweating, increased appetite, mood swings and flushing.    --  Diabetics are recommended to watch their blood glucose level closely for 24-48 hours after the injection.    --  Must stay in PACU for 20 min upon arrival and prove no leg weakness before being discharged.    --  IN THE EVENT OF A LIFE THREATENING EMERGENCY, (CHEST PAIN, BREATHING DIFFICULTIES, PARALYSIS…) YOU SHOULD GO TO YOUR NEAREST EMERGENCY ROOM.    --  You should be contacted by our office within 2-3 days to schedule follow up or next appointment date.  If not contacted within 7 days, please call the office at [phone]     Radiofrequency ablation (RFA):     - Radiofrequency ablation is a term used to describe cauterization or \"burning.\"   - In pain management, we can use this technique with a special needle to target and destroy areas that are causing your pain.   - In most cases, you must have TWO successful \"test injections\" before you are a candidate for RFA.    After your RFA:   - Because heat is used in this technique, it is common to have soreness after the procedure.  Sometimes \"neuritis\" occurs, which feels like tingling, prickly, or sunburn under the skin sensations.   - Ice packs are helpful in decreasing this soreness and preventing post-procedure \"neuritis\" pain.  Use an ice pack for 20 minutes at a time at least 3 times the day of and the day after your procedure.   - It is common to have arm/leg numbness or weakness the day of your procedure, but this should wear off by the following day.   - It may take up to 6 weeks to gain full benefit from this procedure.   "

## 2023-09-11 ENCOUNTER — HOSPITAL ENCOUNTER (OUTPATIENT)
Dept: GENERAL RADIOLOGY | Facility: SURGERY CENTER | Age: 73
Setting detail: HOSPITAL OUTPATIENT SURGERY
End: 2023-09-11
Payer: MEDICARE

## 2023-09-11 ENCOUNTER — HOSPITAL ENCOUNTER (OUTPATIENT)
Facility: SURGERY CENTER | Age: 73
Setting detail: HOSPITAL OUTPATIENT SURGERY
Discharge: HOME | End: 2023-09-11
Attending: ANESTHESIOLOGY | Admitting: ANESTHESIOLOGY
Payer: MEDICARE

## 2023-09-11 VITALS
SYSTOLIC BLOOD PRESSURE: 111 MMHG | DIASTOLIC BLOOD PRESSURE: 67 MMHG | WEIGHT: 182 LBS | RESPIRATION RATE: 16 BRPM | HEIGHT: 64 IN | BODY MASS INDEX: 31.07 KG/M2 | OXYGEN SATURATION: 96 % | TEMPERATURE: 98 F | HEART RATE: 64 BPM

## 2023-09-11 DIAGNOSIS — Z41.9 SURGERY, ELECTIVE: ICD-10-CM

## 2023-09-11 DIAGNOSIS — M47.816 LUMBAR FACET ARTHROPATHY: ICD-10-CM

## 2023-09-11 PROCEDURE — 25010000002 MIDAZOLAM PER 1MG: Performed by: ANESTHESIOLOGY

## 2023-09-11 PROCEDURE — 64636 DESTROY L/S FACET JNT ADDL: CPT | Performed by: ANESTHESIOLOGY

## 2023-09-11 PROCEDURE — 64635 DESTROY LUMB/SAC FACET JNT: CPT | Performed by: ANESTHESIOLOGY

## 2023-09-11 PROCEDURE — 25010000002 FENTANYL CITRATE (PF) 50 MCG/ML SOLUTION: Performed by: ANESTHESIOLOGY

## 2023-09-11 PROCEDURE — 99152 MOD SED SAME PHYS/QHP 5/>YRS: CPT | Performed by: ANESTHESIOLOGY

## 2023-09-11 PROCEDURE — 76000 FLUOROSCOPY <1 HR PHYS/QHP: CPT

## 2023-09-11 PROCEDURE — 25010000002 BUPIVACAINE (PF) 0.25 % SOLUTION 10 ML VIAL: Performed by: ANESTHESIOLOGY

## 2023-09-11 RX ORDER — SODIUM CHLORIDE 0.9 % (FLUSH) 0.9 %
10 SYRINGE (ML) INJECTION AS NEEDED
Status: DISCONTINUED | OUTPATIENT
Start: 2023-09-11 | End: 2023-09-11 | Stop reason: HOSPADM

## 2023-09-11 RX ORDER — SODIUM CHLORIDE 0.9 % (FLUSH) 0.9 %
10 SYRINGE (ML) INJECTION EVERY 12 HOURS SCHEDULED
Status: DISCONTINUED | OUTPATIENT
Start: 2023-09-11 | End: 2023-09-11 | Stop reason: HOSPADM

## 2023-09-11 RX ORDER — MIDAZOLAM HYDROCHLORIDE 1 MG/ML
2 INJECTION INTRAMUSCULAR; INTRAVENOUS ONCE
Status: COMPLETED | OUTPATIENT
Start: 2023-09-11 | End: 2023-09-11

## 2023-09-11 RX ORDER — FENTANYL CITRATE 50 UG/ML
50 INJECTION, SOLUTION INTRAMUSCULAR; INTRAVENOUS ONCE
Status: COMPLETED | OUTPATIENT
Start: 2023-09-11 | End: 2023-09-11

## 2023-09-11 RX ADMIN — MIDAZOLAM HYDROCHLORIDE 2 MG: 2 INJECTION, SOLUTION INTRAMUSCULAR; INTRAVENOUS at 14:59

## 2023-09-11 RX ADMIN — FENTANYL CITRATE 50 MCG: 0.05 INJECTION, SOLUTION INTRAMUSCULAR; INTRAVENOUS at 14:59

## 2023-09-11 NOTE — OP NOTE
Radiofrequency ablation of bilateral L2-L5  [place] [facility]    PREOPERATIVE DIAGNOSIS:  Lumbar spondylosis without myelopathy   POSTOPERATIVE DIAGNOSIS:  Lumbar spondylosis without myelopathy     PROCEDURES PERFORMED:    Bilateral   lumbar radiofrequency ablation of the medial branches at the transverse processes of L2, L3, L4, L5 to thermally treat these facet joints.  1.  64176 -50 Lumbar radiofrequency ablation 1st level.  2.  18673 -50 Lumbar radiofrequency ablation 2nd level.  3.  32044 -50 Lumbar radiofrequency ablation 3rd level.     INFORMED CONSENT:  In preprocedure discussion with the patient, the risks and complications were discussed prior to starting the procedure and informed consent was obtained.     SURGEON:   Mildred Mirza MD    INDICATIONS:  The patient presents with chronic lower back pain. The patient underwent 2 lumbar medial branch blocks with diagnostically positive relief. Given the patient’s significant pain relief, it is diagnostic that we have likely found the source of the patient’s pain; therefore, lumbar radiofrequency ablation has been indicated. Due to significant spondylosis in more than 2 levels of facet joints, 3 levels will be treated on this patient.      SEDATION:  Anxiolysis was used for this procedure which included Versed 2mg & Fentanyl 50mcg    TIME OF PROCEDURE:  The Interoperative procedure time, after administration of the IV sedative, was 3735-5641 minutes.    ANESTHETIC:  Lidocaine 1% for skin infiltration, 2% lidocaine and 0.25% bupivacaine for injection.    STEROID:  None.    DESCRIPTION OF PROCEDURE:  After obtaining informed consent an IV was  started in the preoperative area. The patient was taken to the operating room. The patient was placed in prone position with a pillow under the abdomen. All pressure points were padded. EKG, blood pressure, and pulse oximetry were monitored. The patient was  sedated. The lumbosacral area was prepped with  ChloraPrep and draped in a sterile fashion.     The junction of the right S1 superior articular process and sacral ala was identified in a AP fluoroscopic view. The image was then obliqued towards the right side to maximize visualization of the junctions of the L2, L3, L4, L5 superior articular processes with the transverse processes.  The skin and subcutaneous tissue inferior to the junction was anesthetized with 1% lidocaine. A 20-gauge 150mm RF Abbott needle was then advanced percutaneously through the anesthetized skin tract under fluoroscopic guidance until the non-insulated portion of the needle lie at the junction.    All needle tips were confirmed to be posterior to the neural foramen in the lateral fluoroscopic view. Sensory stimulation was then performed with good stimulation of the back at 0.5V or less at each level. Motor stimulation was performed up to 1.5V at each level producing stimulation of the multifidus muscles of the back and no stimulation of the lower extremity at any level. Each level was then anesthetized with 2% lidocaine prior to treatment with pulsed radiofrequency thermocoagulation at 42 degrees Celsius. Each level was then treated with thermal radiofrequency thermocoagulation at 80 degrees Celsius for 60 seconds in two separate cycles.  Prior to the removal of each needle, a volume of 1 mL of injectate was administered at each site.  The total volume consisted of 1mL of 2% lidocaine and 1mL of 0.25% bupivacaine.    The same procedure was then performed on the contralateral side in the exact same fashion.      ESTIMATED BLOOD LOSS:  Minimal.    SPECIMENS:  None.    COMPLICATIONS:  None.    TOLERANCE AND DISCHARGE:  The patient tolerated the procedure well. The patient was transported to the recovery area without difficulties. The patient was discharged home under the care of family in stable and satisfactory condition.    PLAN:  1.  The patient was given our standard instruction  sheet.  2.  The patient will resume all medications per the medication reconciliation sheet.  3.  The patient will Return to clinic 6 wks.

## 2023-09-25 RX ORDER — EZETIMIBE 10 MG/1
TABLET ORAL
Qty: 90 TABLET | Refills: 3 | Status: SHIPPED | OUTPATIENT
Start: 2023-09-25

## 2023-10-24 DIAGNOSIS — E78.5 HYPERLIPIDEMIA, UNSPECIFIED HYPERLIPIDEMIA TYPE: ICD-10-CM

## 2023-10-24 RX ORDER — ROSUVASTATIN CALCIUM 40 MG/1
TABLET, COATED ORAL
Qty: 90 TABLET | Refills: 1 | Status: SHIPPED | OUTPATIENT
Start: 2023-10-24

## 2023-10-31 ENCOUNTER — OFFICE VISIT (OUTPATIENT)
Dept: PAIN MEDICINE | Facility: CLINIC | Age: 73
End: 2023-10-31
Payer: MEDICARE

## 2023-10-31 ENCOUNTER — PREP FOR SURGERY (OUTPATIENT)
Dept: SURGERY | Facility: SURGERY CENTER | Age: 73
End: 2023-10-31
Payer: MEDICARE

## 2023-10-31 VITALS
DIASTOLIC BLOOD PRESSURE: 78 MMHG | HEART RATE: 89 BPM | SYSTOLIC BLOOD PRESSURE: 110 MMHG | HEIGHT: 64 IN | WEIGHT: 184.8 LBS | RESPIRATION RATE: 18 BRPM | OXYGEN SATURATION: 98 % | BODY MASS INDEX: 31.55 KG/M2 | TEMPERATURE: 98 F

## 2023-10-31 DIAGNOSIS — M70.62 TROCHANTERIC BURSITIS OF BOTH HIPS: Primary | ICD-10-CM

## 2023-10-31 DIAGNOSIS — M70.61 TROCHANTERIC BURSITIS OF BOTH HIPS: Primary | ICD-10-CM

## 2023-10-31 DIAGNOSIS — M47.816 LUMBAR FACET ARTHROPATHY: ICD-10-CM

## 2023-10-31 DIAGNOSIS — M53.3 SACROILIAC JOINT DYSFUNCTION: Primary | ICD-10-CM

## 2023-10-31 DIAGNOSIS — M53.3 SACROILIAC JOINT DYSFUNCTION: ICD-10-CM

## 2023-10-31 PROCEDURE — 1125F AMNT PAIN NOTED PAIN PRSNT: CPT | Performed by: NURSE PRACTITIONER

## 2023-10-31 PROCEDURE — 1160F RVW MEDS BY RX/DR IN RCRD: CPT | Performed by: NURSE PRACTITIONER

## 2023-10-31 PROCEDURE — 1159F MED LIST DOCD IN RCRD: CPT | Performed by: NURSE PRACTITIONER

## 2023-10-31 PROCEDURE — 3074F SYST BP LT 130 MM HG: CPT | Performed by: NURSE PRACTITIONER

## 2023-10-31 PROCEDURE — 3078F DIAST BP <80 MM HG: CPT | Performed by: NURSE PRACTITIONER

## 2023-10-31 PROCEDURE — 99214 OFFICE O/P EST MOD 30 MIN: CPT | Performed by: NURSE PRACTITIONER

## 2023-10-31 RX ORDER — MELOXICAM 7.5 MG/1
TABLET ORAL
Qty: 60 TABLET | Refills: 2 | Status: SHIPPED | OUTPATIENT
Start: 2023-10-31

## 2023-10-31 NOTE — PROGRESS NOTES
CHIEF COMPLAINT  Follow-up for back pain.    Subjective   Dian Jefferson is a 73 y.o. female  who presents to the office for follow-up of procedure.  She completed a Radiofrequency ablation of bilateral L2-L5  on  9/11/2023 performed by Dr. Mirza for management of back pain. Patient reports 80% ongoing relief from the procedure.   She reports recurrence of left SI joint pain. It is keeping her from walking any distance.     Procedures ---   Bilateral GT bursa injection 8/24/2023 --- significant relief which is ongoing   Left SI joint injection 12/7/2022 --- 80% relief 3-4 months  Bilateral GT bursa injection on 10/11/2022 --- 85% relief which is mostly ongoing  Radiofrequency ablation of bilateral L2-L5 on  3/30/2022 --- 50% relief x greater than one year      Takes Meloxicam and Baclofen which help some.  Not a lot. Also not really interested in medication.      Back Pain  This is a chronic problem. The current episode started more than 1 month ago. The problem occurs daily. The problem has been waxing and waning since onset. The pain is present in the lumbar spine and sacro-iliac. The quality of the pain is described as aching and burning. The pain does not radiate. The pain is at a severity of 3/10. The symptoms are aggravated by standing (walking). Associated symptoms include weakness. Pertinent negatives include no abdominal pain, chest pain, dysuria, fever, headaches or numbness. She has tried analgesics (rest, laying down flat) for the symptoms. The treatment provided mild relief.      PEG Assessment   What number best describes your pain on average in the past week?6  What number best describes how, during the past week, pain has interfered with your enjoyment of life?6  What number best describes how, during the past week, pain has interfered with your general activity?  7    Review of Pertinent Medical Data ---  MRI LUMBAR SPINE      Lab Results   Component Value Date    GLUCOSE 109 (H) 08/03/2023    BUN 12  "08/03/2023    CREATININE 0.96 08/03/2023    EGFRRESULT 63.4 08/03/2023    BCR 12.5 08/03/2023    K 4.2 08/03/2023    CO2 22.8 08/03/2023    CALCIUM 9.8 08/03/2023    PROTENTOTREF 6.5 08/03/2023    ALBUMIN 4.6 08/03/2023    BILITOT 0.5 08/03/2023    AST 26 08/03/2023    ALT 43 (H) 08/03/2023         The following portions of the patient's history were reviewed and updated as appropriate: allergies, current medications, past family history, past medical history, past social history, past surgical history, and problem list.    Review of Systems   Constitutional:  Negative for fever.   Cardiovascular:  Negative for chest pain.   Gastrointestinal:  Negative for abdominal pain, constipation and diarrhea.   Genitourinary:  Negative for difficulty urinating and dysuria.   Musculoskeletal:  Positive for back pain.   Neurological:  Positive for weakness. Negative for numbness and headaches.   Psychiatric/Behavioral:  Negative for sleep disturbance and suicidal ideas. The patient is not nervous/anxious.      I have reviewed and confirmed the accuracy of the ROS as documented by the MA/LPN/RN LIZABETH Rico     Vitals:    10/31/23 1535   BP: 110/78   Pulse: 89   Resp: 18   Temp: 98 °F (36.7 °C)   SpO2: 98%   Weight: 83.8 kg (184 lb 12.8 oz)   Height: 162.6 cm (64\")   PainSc:   3   PainLoc: Back         Objective   Physical Exam  Vitals and nursing note reviewed.   Constitutional:       General: She is not in acute distress.     Appearance: Normal appearance. She is not ill-appearing.   Pulmonary:      Effort: Pulmonary effort is normal. No respiratory distress.   Musculoskeletal:      Lumbar back: Tenderness and bony tenderness present.      Comments: +left SI joint tenderness  +left SI joint compression  +left thigh thrust  +left SHARIFA    Neurological:      Mental Status: She is alert and oriented to person, place, and time.      Motor: Motor function is intact.      Gait: Gait normal.   Psychiatric:         Mood and " Affect: Mood normal.         Behavior: Behavior normal.           Assessment & Plan   Diagnoses and all orders for this visit:    1. Trochanteric bursitis of both hips (Primary)    2. Lumbar facet arthropathy    3. Sacroiliac joint dysfunction    Other orders  -     meloxicam (Mobic) 7.5 MG tablet; 1-2 tabs daily as needed  Dispense: 60 tablet; Refill: 2        --- Left SI joint injection     ----------  Education about Sacroiliac joint injections:  This Sacroiliac joint injection (blockade) we have suggested is intended for diagnostic purposes, with the intent of offering the patient Radiofrequency thermal rhizotomy (RF) of the sensory branches to the joint if the block is diagnostically effective.  The diagnostic blockade is necessary to determine the likelihood that RF therapy could be efficacious in providing long term relief to the patient.    In this procedure, the sacroiliac joint is aligned with imaging, and under image guidance a needle is placed with the needle tip into the joint.  The needle position is confirmed to be appropriately in the joint before injection of medication into the joint.  When xray fluoroscopy is used, contrast dye is used to confirm a proper arthrogram (i.e., outline of the joint).  When ultrasound is used, IV fluid (normal saline) is injected to see the flow of the fluid into the joint.  Once confirmed, then the medication can be injected into the joint.  Oftentimes this medication is a combination of local anesthetics (for diagnostic purposes) and also a steroid (to decrease irritation & inflammation in the joint, also known as sacroilitis).      Medically, a successful RF procedure should provide a patient with 50% pain relief or more for at least 6 months.  Clinical experience suggests that successful patients receive relief more in the range of 12 months on average.  We also discussed that many patients receive therapeutic success from the intraarticular joint injection, and may  not require RF ablation.  If a patient receives more than 8 weeks of relief from joint injection, then occasional repeat joint blocks for therapeutic purposes is a very reasonable alternative therapy.  This course of therapy is consistent with our LCDs according to our CMS  in the area, and therefore other insurance providers should follow accordingly.  We will monitor our patients to screen for these therapeutic responders and will offer RF therapy only when necessary.      We discussed that joint injections & also RF procedures are not without risks.  Best practices regarding anticoagulant use & neuraxial procedures will be respected.  Oftentimes a patient on an anticoagulant can be offered a joint injection safely, but again this is not risk-free, and such patients give consent with regards to this increased bleeding risk, which could cause problems including but not limited to worsening of pain, nerve damage, or muscle damage.  Patients that are ill or otherwise may be at risk for sepsis will not have their spines accessed by neuraxial injections of any type.  This patient will not be offered these therapies if there is an increased risk.   We discussed that there is a risk of postprocedural pain and also a risk of worsening of clinical picture with these procedures as with any neuraxial procedure.    ----------      --- Refill Meloxicam    Dian Jefferson reports a pain score of 3.  Given her pain assessment as noted, treatment options were discussed and the following options were decided upon as a follow-up plan to address the patient's pain:  see plan above .      --- Follow-up for SI joint injection and 4-6 weeks post procedure                     Dictated utilizing Dragon dictation.

## 2023-11-02 ENCOUNTER — TRANSCRIBE ORDERS (OUTPATIENT)
Dept: SURGERY | Facility: SURGERY CENTER | Age: 73
End: 2023-11-02
Payer: MEDICARE

## 2023-11-02 DIAGNOSIS — Z41.9 SURGERY, ELECTIVE: Primary | ICD-10-CM

## 2023-11-17 ENCOUNTER — OFFICE VISIT (OUTPATIENT)
Dept: INTERNAL MEDICINE | Facility: CLINIC | Age: 73
End: 2023-11-17
Payer: MEDICARE

## 2023-11-17 VITALS
DIASTOLIC BLOOD PRESSURE: 62 MMHG | TEMPERATURE: 97.9 F | WEIGHT: 184.6 LBS | HEIGHT: 64 IN | OXYGEN SATURATION: 98 % | BODY MASS INDEX: 31.51 KG/M2 | HEART RATE: 67 BPM | SYSTOLIC BLOOD PRESSURE: 110 MMHG

## 2023-11-17 DIAGNOSIS — J40 BRONCHITIS: Primary | ICD-10-CM

## 2023-11-17 PROCEDURE — 1160F RVW MEDS BY RX/DR IN RCRD: CPT | Performed by: NURSE PRACTITIONER

## 2023-11-17 PROCEDURE — 3078F DIAST BP <80 MM HG: CPT | Performed by: NURSE PRACTITIONER

## 2023-11-17 PROCEDURE — 1159F MED LIST DOCD IN RCRD: CPT | Performed by: NURSE PRACTITIONER

## 2023-11-17 PROCEDURE — 3074F SYST BP LT 130 MM HG: CPT | Performed by: NURSE PRACTITIONER

## 2023-11-17 PROCEDURE — 99213 OFFICE O/P EST LOW 20 MIN: CPT | Performed by: NURSE PRACTITIONER

## 2023-11-17 RX ORDER — BENZONATATE 200 MG/1
200 CAPSULE ORAL 3 TIMES DAILY PRN
Qty: 30 CAPSULE | Refills: 0 | Status: SHIPPED | OUTPATIENT
Start: 2023-11-17

## 2023-11-17 RX ORDER — AZITHROMYCIN 250 MG/1
TABLET, FILM COATED ORAL
Qty: 6 TABLET | Refills: 0 | Status: SHIPPED | OUTPATIENT
Start: 2023-11-17

## 2023-11-17 NOTE — PROGRESS NOTES
Subjective   Dian Jefferson is a 73 y.o. female. Patient is here today for   Chief Complaint   Patient presents with    Sore Throat           Cough       Patient complains of coughing and sore throat for 3 days, she says her phlegm is yellowish/greenish    .    History of Present Illness   C/o cough associated with loss of voice, sore throat, nasal congestion. Cough is productive. Taking Robitussin, vit C, elderberry and cough drops.   Denies sick contacts. Denies fever.    The following portions of the patient's history were reviewed and updated as appropriate: allergies, current medications, past family history, past medical history, past social history, past surgical history and problem list.    Review of Systems    Objective   Vitals:    11/17/23 1518   BP: 110/62   Pulse: 67   Temp: 97.9 °F (36.6 °C)   SpO2: 98%     Body mass index is 31.67 kg/m².  Physical Exam  Vitals and nursing note reviewed.   Constitutional:       Appearance: Normal appearance. She is well-developed.   HENT:      Right Ear: Ear canal normal. A middle ear effusion is present.      Left Ear: Ear canal normal. A middle ear effusion is present.      Mouth/Throat:      Pharynx: Oropharynx is clear.   Cardiovascular:      Rate and Rhythm: Normal rate and regular rhythm.      Heart sounds: Normal heart sounds.   Pulmonary:      Effort: Pulmonary effort is normal.      Breath sounds: Normal breath sounds. Examination of the right-upper field reveals wheezing.   Skin:     General: Skin is warm and dry.   Neurological:      Mental Status: She is alert and oriented to person, place, and time.   Psychiatric:         Speech: Speech normal.         Behavior: Behavior normal.         Thought Content: Thought content normal.         Assessment & Plan   Diagnoses and all orders for this visit:    1. Bronchitis (Primary)  -     azithromycin (Zithromax Z-Neeraj) 250 MG tablet; Take 2 tablets by mouth on day 1, then 1 tablet daily on days 2-5  Dispense: 6  tablet; Refill: 0  -     benzonatate (TESSALON) 200 MG capsule; Take 1 capsule by mouth 3 (Three) Times a Day As Needed for Cough.  Dispense: 30 capsule; Refill: 0      Patient will be treated for bronchitis. F/u if symptoms do not improve

## 2023-11-20 ENCOUNTER — TELEPHONE (OUTPATIENT)
Dept: INTERNAL MEDICINE | Facility: CLINIC | Age: 73
End: 2023-11-20
Payer: MEDICARE

## 2023-11-20 ENCOUNTER — CLINICAL SUPPORT (OUTPATIENT)
Dept: INTERNAL MEDICINE | Facility: CLINIC | Age: 73
End: 2023-11-20
Payer: MEDICARE

## 2023-11-20 DIAGNOSIS — J40 BRONCHITIS: Primary | ICD-10-CM

## 2023-11-20 PROCEDURE — 96372 THER/PROPH/DIAG INJ SC/IM: CPT | Performed by: NURSE PRACTITIONER

## 2023-11-20 RX ORDER — METHYLPREDNISOLONE ACETATE 40 MG/ML
60 INJECTION, SUSPENSION INTRA-ARTICULAR; INTRALESIONAL; INTRAMUSCULAR; SOFT TISSUE ONCE
Status: COMPLETED | OUTPATIENT
Start: 2023-11-20 | End: 2023-11-20

## 2023-11-20 RX ORDER — DEXTROMETHORPHAN HYDROBROMIDE AND PROMETHAZINE HYDROCHLORIDE 15; 6.25 MG/5ML; MG/5ML
5 SYRUP ORAL 4 TIMES DAILY PRN
Qty: 180 ML | Refills: 0 | Status: SHIPPED | OUTPATIENT
Start: 2023-11-20

## 2023-11-20 RX ORDER — HYDROCODONE POLISTIREX AND CHLORPHENIRAMINE POLISTIREX 10; 8 MG/5ML; MG/5ML
5 SUSPENSION, EXTENDED RELEASE ORAL EVERY 12 HOURS PRN
Qty: 120 ML | Refills: 0 | Status: SHIPPED | OUTPATIENT
Start: 2023-11-20 | End: 2023-11-20

## 2023-11-20 RX ADMIN — METHYLPREDNISOLONE ACETATE 60 MG: 40 INJECTION, SUSPENSION INTRA-ARTICULAR; INTRALESIONAL; INTRAMUSCULAR; SOFT TISSUE at 11:55

## 2023-11-20 NOTE — TELEPHONE ENCOUNTER
"Caller: Jefferson Dian PIERRE \"ODELL\"    Relationship: Self    Best call back number: [phone]     Who are you requesting to speak with (clinical staff, provider,  specific staff member): CLINICAL STAFF    What was the call regarding: PATIENT STATES THAT SHE HAD AN APPOINTMENT FRIDAY WITH SANTANA HURD. SHE WAS DIAGNOSED WITH BRONCHITIS AND PRESCRIBED A Z-PACK ANTIBIOTIC AND COUGH MEDICATION TABLETS. SHE HAS 1 DOSE OF THE Z-PACK REMAINING, BUT IS NOT FEELING ANY BETTER. REQUESTS A CALL BACK TO DISCUSS ADDITIONAL TREATMENT OPTIONS  "

## 2023-11-20 NOTE — TELEPHONE ENCOUNTER
Caller: [facility] [address] [place], [place] [address], [place] - [address] - [phone]  - [phone]    Relationship to patient: Pharmacy      Patient is needing: [employer] IS OUT OF STOCK AT PHARMACY AND INSURANCE DOES NOT COVER. WOULD LIKE TO KNOW IF IT CAN BE CHANGED TO SOMETHING IN STOCK. THEY DO HAVE [employer] DM IN STOCK.

## 2023-12-01 NOTE — DISCHARGE INSTRUCTIONS
[facility] - Post-procedure Instructions          --  While there are no absolute restrictions, it is recommended that you do not perform strenuous activity today. In the morning, you may resume your level of activity as before your block.    --  If you have a band-aid at your injection site, please remove it later today. Observe the area for any redness, swelling, pus-like drainage, or a temperature over 101°. If any of these symptoms occur, please call your doctor at [phone]. If after office hours, leave a message and the on-call provider will return your call.    --  Ice may be applied to your injection site. It is recommended you avoid direct heat (heating pad; hot tub) for 1-2 days.    --  Call [facility] at [phone] if you experience persistent headache, persistent bleeding from the injection site, or severe pain not relieved by heat or oral medication.    --  Do not make important decisions today.    --  Due to the effects of the block and/or the I.V. Sedation, DO NOT drive or operate hazardous machinery for 12 hours.  Local anesthetics may cause numbness after procedure and precautions must be taken with regards to operating equipment as well as with walking, even if ambulating with assistance of another person or with an assistive device.    --  Do not drink alcohol for 12 hours.    -- You may return to work tomorrow, or as directed by your referring doctor.    --  Occasionally you may notice a slight increase in your pain after the procedure. This should start to improve within the next 24-48 hours. Radiofrequency ablation procedure pain may last 3-4 weeks.    --  It may take as long as 3-4 days before you notice a gradual improvement in your pain and/or other symptoms.    -- You may continue to take your prescribed pain medication as needed.    --  Some normal possible side effects of steroid use could include fluid retention, increased blood sugar, dull headache,  increased sweating, increased appetite, mood swings and flushing.    --  Diabetics are recommended to watch their blood glucose level closely for 24-48 hours after the injection.    --  Must stay in PACU for 20 min upon arrival and prove no leg weakness before being discharged.    --  IN THE EVENT OF A LIFE THREATENING EMERGENCY, (CHEST PAIN, BREATHING DIFFICULTIES, PARALYSIS…) YOU SHOULD GO TO YOUR NEAREST EMERGENCY ROOM.    --  You should be contacted by our office within 2-3 days to schedule follow up or next appointment date.  If not contacted within 7 days, please call the office at [phone]

## 2023-12-01 NOTE — H&P
[facility]   HISTORY AND PHYSICAL    Patient Name: Dian Jefferson  : 1950  MRN: [id number]  Primary Care Physician:  Mallika Stewart MD  Date of admission: 2023    Subjective   Subjective     Chief Complaint: Left low back pain    History of Present Illness  Chronic left sacroiliac joint region pain.  She had good relief from previous sacroiliac joint injection would like to repeat that today.      Review of Systems   Constitutional:  Negative for chills and fever.   Respiratory:  Negative for cough and shortness of breath.    Musculoskeletal:  Positive for back pain.        Personal History     Past Medical History:   Diagnosis Date    Anxiety     Colon polyp     GERD (gastroesophageal reflux disease)     HLD (hyperlipidemia)     Hot flash, menopausal     HTN (hypertension)     Hypothyroidism     Mammogram abnormal     Myalgia     Vitamin D deficiency        Past Surgical History:   Procedure Laterality Date    BREAST SURGERY      ENLARGEMENT    CATARACT EXTRACTION Bilateral     Aug 2023    CATARACT EXTRACTION, BILATERAL Bilateral 2021, 3/2021    COLONOSCOPY      COLONOSCOPY N/A 2022    Procedure: COLONOSCOPY;  Surgeon: Lorraine Alba MD;  [place] [facility] [place]  Service: Gastroenterology;  Laterality: N/A;  Diverticulosis    MEDIAL BRANCH BLOCK Bilateral 2022    Procedure: LUMBAR MEDIAL BRANCH BLOCK Bilateral ~L2-L5 x2 (2 weeks apart);  Surgeon: Mildred Mirza MD;  [place] [facility] [place];  Service: Pain Management;  Laterality: Bilateral;    MEDIAL BRANCH BLOCK Bilateral 2022    Procedure: LUMBAR MEDIAL BRANCH BLOCK Bilateral ~L2-L5 x2 (2 weeks apart);  Surgeon: Mildred Mirza MD;  [place] [facility] [place];  Service: Pain Management;  Laterality: Bilateral;    RADIOFREQUENCY ABLATION Bilateral 2022    Procedure: RADIOFREQUENCY ABLATION LUMBAR bilateral L2-L5;  Surgeon: Mildred Mirza MD;  [place] [facility] [place];  Service: Pain Management;  Laterality:  Bilateral;    RADIOFREQUENCY ABLATION Bilateral 09/11/2023    Procedure: RADIOFREQUENCY ABLATION LUMBAR. Bilateral L3-S1.  [id number], [id number];  Surgeon: Mildred Mirza MD;  Location: [facility];  Service: Pain Management;  Laterality: Bilateral;    SACROILIAC JOINT INJECTION Left 12/07/2022    Procedure: SACROILIAC INJECTION;  Surgeon: Mildred Mirza MD;  Location: [facility];  Service: Pain Management;  Laterality: Left;    TONSILLECTOMY      TUBAL ABDOMINAL LIGATION         Family History: family history includes Breast cancer in her maternal aunt and mother; Colon polyps in her sister; Dementia in her mother; Depression in her mother; Heart attack in her father; Hypertension in her father, sister, and sister. Otherwise pertinent FHx was reviewed and not pertinent to current issue.    Social History:  reports that she has never smoked. She has never used smokeless tobacco. She reports current alcohol use. She reports that she does not use drugs.    Home Medications:  Vitamin D3, baclofen, benzonatate, cetirizine, ezetimibe, levothyroxine, lidocaine, lisinopril, meloxicam, promethazine-dextromethorphan, rosuvastatin, and vilazodone    Allergies:  No Known Allergies    Objective    Objective     Vitals:   Temp:  [97.6 °F (36.4 °C)] 97.6 °F (36.4 °C)  Heart Rate:  [57] 57  Resp:  [18] 18  BP: (117)/(74) 117/74    Physical Exam  Constitutional:       General: She is not in acute distress.  Pulmonary:      Effort: Pulmonary effort is normal. No respiratory distress.   Skin:     General: Skin is warm and dry.   Neurological:      Mental Status: She is alert.   Psychiatric:         Mood and Affect: Mood normal.         Thought Content: Thought content normal.         Result Review    Result Review:  I have personally reviewed the results from the time of this admission to 12/4/2023 14:55 EST and agree with these findings:  []  Laboratory list / accordion  []  Microbiology  []  Radiology  []  EKG/Telemetry   []   Cardiology/Vascular   []  Pathology  []  Old records  []  Other:  Most notable findings include: No new      Assessment & Plan   Assessment / Plan     Brief Patient Summary:  Dian Jefferson is a 73 y.o. female who has chronic left-sided sacroiliac joint pain    Active Hospital Problems:  Active Hospital Problems    Diagnosis     **Sacroiliac joint dysfunction      Plan: Left sacroiliac joint injection      DVT prophylaxis:  No DVT prophylaxis order currently exists.      Mildred Mirza MD

## 2023-12-04 ENCOUNTER — HOSPITAL ENCOUNTER (OUTPATIENT)
Facility: SURGERY CENTER | Age: 73
Setting detail: HOSPITAL OUTPATIENT SURGERY
Discharge: HOME | End: 2023-12-04
Attending: ANESTHESIOLOGY | Admitting: ANESTHESIOLOGY
Payer: MEDICARE

## 2023-12-04 ENCOUNTER — HOSPITAL ENCOUNTER (OUTPATIENT)
Dept: GENERAL RADIOLOGY | Facility: SURGERY CENTER | Age: 73
Setting detail: HOSPITAL OUTPATIENT SURGERY
End: 2023-12-04
Payer: MEDICARE

## 2023-12-04 VITALS
WEIGHT: 184 LBS | HEIGHT: 64 IN | DIASTOLIC BLOOD PRESSURE: 86 MMHG | OXYGEN SATURATION: 96 % | RESPIRATION RATE: 18 BRPM | TEMPERATURE: 97.7 F | HEART RATE: 60 BPM | SYSTOLIC BLOOD PRESSURE: 121 MMHG | BODY MASS INDEX: 31.41 KG/M2

## 2023-12-04 DIAGNOSIS — Z41.9 SURGERY, ELECTIVE: ICD-10-CM

## 2023-12-04 PROCEDURE — 27096 INJECT SACROILIAC JOINT: CPT | Performed by: ANESTHESIOLOGY

## 2023-12-04 PROCEDURE — 25510000001 IOPAMIDOL 61 % SOLUTION 30 ML VIAL: Performed by: ANESTHESIOLOGY

## 2023-12-04 PROCEDURE — 77002 NEEDLE LOCALIZATION BY XRAY: CPT

## 2023-12-04 PROCEDURE — 25010000002 DEXAMETHASONE SODIUM PHOSPHATE 100 MG/10ML SOLUTION 10 ML VIAL: Performed by: ANESTHESIOLOGY

## 2023-12-04 PROCEDURE — G0260 INJ FOR SACROILIAC JT ANESTH: HCPCS | Performed by: ANESTHESIOLOGY

## 2023-12-04 NOTE — OP NOTE
Left Sacroiliac Joint Injection  [facility]      PREOPERATIVE DIAGNOSIS:   Sacroiliac joint dysfunction    POSTOPERATIVE DIAGNOSIS:  Same    PROCEDURE:  Sacroiliac Joint Injection, with fluoroscopic guidance CPT 19798 -LT    PRE-PROCEDURE DISCUSSION WITH PATIENT:    Risks and complications were discussed with the patient prior to starting the procedure and informed consent was obtained.  We discussed various topics including but not limited to bleeding, infection, injury, postprocedural site soreness, painful flareup, worsening of clinical picture, paralysis, coma, and death.     SURGEON:  Mildred Mirza MD    REASON FOR PROCEDURE:    Patient has pain consistent with SI pathology on history and physical exam. Previous clinically significant therapeutic effect of SI joint injection.      SEDATION:  No sedation was used for this procedure  ANESTHETIC AGENT:  1% Lidocaine  STEROID AGENT:  15mg Dexamethasone    DESCRIPTON OF PROCEDURE:  After obtaining informed consent, IV access was not obtained in the preoperative area.  The patient was transported to the operative suite and placed in the prone position. EKG, blood pressure, and pulse oximeter were monitored. The lumbosacral area was prepped with Chloraprep and draped in a sterile fashion. Under fluoroscopic guidance the inferior most portion of the left SI joint was identified. The overlying skin and subcutaneous tissue was anesthetized with 1% lidocaine. A 22-gauge spinal needle was then advanced under fluoroscopic guidance in a coaxial view into the joint space.  After negative aspiration, 1 mL of Isovue 61% was injected, and after seeing appropriate spread into the joint a volume of 3ml of the above medication was injected.    Needle was removed intact.      Vital signs remained stable.  The onset of analgesia was noted.    Pre-procedure pain score: 2/10 at rest, 8/10 with activity  Post-procedure pain score: 0/10      ESTIMATED BLOOD LOSS:   minimal  SPECIMENS:  None  COMPLICATIONS:  No complications were noted. and There was no indication of vascular uptake on live injection of contrast dye.    TOLERANCE & DISCHARGE CONDITION:    The patient tolerated the procedure well.  The patient was transported to the recovery area without difficulties.  The patient was discharged to home under the care of family in stable and satisfactory condition.    PLAN OF CARE:  The patient was given our standard instruction sheet and will resume all medications as per the medication reconciliation sheet.  The patient will Return to clinic 4-6 wks.  The patient is instructed to keep an account of pain relief after the procedure.

## 2023-12-13 ENCOUNTER — OFFICE VISIT (OUTPATIENT)
Dept: INTERNAL MEDICINE | Facility: CLINIC | Age: 73
End: 2023-12-13
Payer: MEDICARE

## 2023-12-13 VITALS
SYSTOLIC BLOOD PRESSURE: 108 MMHG | DIASTOLIC BLOOD PRESSURE: 76 MMHG | WEIGHT: 186.6 LBS | HEIGHT: 64 IN | BODY MASS INDEX: 31.86 KG/M2 | HEART RATE: 68 BPM | TEMPERATURE: 97.9 F | OXYGEN SATURATION: 98 %

## 2023-12-13 DIAGNOSIS — J06.9 ACUTE URI: Primary | ICD-10-CM

## 2023-12-13 PROCEDURE — 3074F SYST BP LT 130 MM HG: CPT | Performed by: INTERNAL MEDICINE

## 2023-12-13 PROCEDURE — 99213 OFFICE O/P EST LOW 20 MIN: CPT | Performed by: INTERNAL MEDICINE

## 2023-12-13 PROCEDURE — 3078F DIAST BP <80 MM HG: CPT | Performed by: INTERNAL MEDICINE

## 2023-12-13 RX ORDER — HYDROCODONE POLISTIREX AND CHLORPHENIRAMINE POLISTIREX 10; 8 MG/5ML; MG/5ML
5 SUSPENSION, EXTENDED RELEASE ORAL EVERY 12 HOURS PRN
Qty: 140 ML | Refills: 0 | Status: SHIPPED | OUTPATIENT
Start: 2023-12-13

## 2023-12-13 NOTE — PROGRESS NOTES
Subjective   Dian Jefferson is a 73 y.o. female.     Cough  Associated symptoms include chest pain.   Chest Pain   Associated symptoms include a cough.      She has had uri  for 4 weeks  she was feeling better  no fever or chills but cough is worse this this    She is not wheezing no sig sinus congestion    The following portions of the patient's history were reviewed and updated as appropriate: allergies, current medications, past family history, past medical history, past social history, past surgical history, and problem list.    Review of Systems   Respiratory:  Positive for cough.    Cardiovascular:  Positive for chest pain.       Objective   Physical Exam  Vitals reviewed.   Constitutional:       Appearance: She is well-developed.   HENT:      Head: Normocephalic and atraumatic.      Right Ear: External ear normal.      Left Ear: External ear normal.   Eyes:      Conjunctiva/sclera: Conjunctivae normal.      Pupils: Pupils are equal, round, and reactive to light.   Neck:      Thyroid: No thyromegaly.      Trachea: No tracheal deviation.   Cardiovascular:      Rate and Rhythm: Normal rate and regular rhythm.      Heart sounds: Normal heart sounds.   Pulmonary:      Effort: Pulmonary effort is normal.      Breath sounds: Normal breath sounds.   Abdominal:      General: Bowel sounds are normal. There is no distension.      Palpations: Abdomen is soft.      Tenderness: There is no abdominal tenderness.   Musculoskeletal:         General: No deformity. Normal range of motion.      Cervical back: Normal range of motion.   Skin:     General: Skin is warm and dry.   Neurological:      Mental Status: She is alert and oriented to person, place, and time.   Psychiatric:         Behavior: Behavior normal.         Thought Content: Thought content normal.         Judgment: Judgment normal.       Vitals:    12/13/23 1152   BP: 108/76   Pulse: 68   Temp: 97.9 °F (36.6 °C)   SpO2: 98%     Body mass index is 32.03 kg/m².          Assessment & Plan   Diagnoses and all orders for this visit:    1. Acute URI (Primary)    Other orders  -     Hydrocod Tony-Chlorphe Tony ER (TUSSIONEX PENNKINETIC) 10-8 MG/5ML ER suspension; Take 5 mL by mouth Every 12 (Twelve) Hours As Needed for Cough.  Dispense: 140 mL; Refill: 0       URI-  she has bene on steroids and abx  try tussionex and use delsym  lungs clear  no sx of sinusitis

## 2024-02-28 DIAGNOSIS — E78.5 HYPERLIPIDEMIA, UNSPECIFIED HYPERLIPIDEMIA TYPE: Primary | ICD-10-CM

## 2024-02-28 DIAGNOSIS — R73.09 ELEVATED GLUCOSE: ICD-10-CM

## 2024-02-28 DIAGNOSIS — E03.9 ACQUIRED HYPOTHYROIDISM: ICD-10-CM

## 2024-02-28 DIAGNOSIS — I10 ESSENTIAL HYPERTENSION: ICD-10-CM

## 2024-03-01 LAB
ALBUMIN SERPL-MCNC: 4.6 G/DL (ref 3.5–5.2)
ALBUMIN/GLOB SERPL: 2.7 G/DL
ALP SERPL-CCNC: 51 U/L (ref 39–117)
ALT SERPL-CCNC: 40 U/L (ref 1–33)
AST SERPL-CCNC: 27 U/L (ref 1–32)
BASOPHILS # BLD AUTO: 0.04 10*3/MM3 (ref 0–0.2)
BASOPHILS NFR BLD AUTO: 0.7 % (ref 0–1.5)
BILIRUB SERPL-MCNC: 0.8 MG/DL (ref 0–1.2)
BUN SERPL-MCNC: 11 MG/DL (ref 8–23)
BUN/CREAT SERPL: 12.2 (ref 7–25)
CALCIUM SERPL-MCNC: 9.5 MG/DL (ref 8.6–10.5)
CHLORIDE SERPL-SCNC: 107 MMOL/L (ref 98–107)
CHOLEST SERPL-MCNC: 172 MG/DL (ref 0–200)
CO2 SERPL-SCNC: 22.4 MMOL/L (ref 22–29)
CREAT SERPL-MCNC: 0.9 MG/DL (ref 0.57–1)
EGFRCR SERPLBLD CKD-EPI 2021: 68.1 ML/MIN/1.73
EOSINOPHIL # BLD AUTO: 0.09 10*3/MM3 (ref 0–0.4)
EOSINOPHIL NFR BLD AUTO: 1.6 % (ref 0.3–6.2)
ERYTHROCYTE [DISTWIDTH] IN BLOOD BY AUTOMATED COUNT: 13.1 % (ref 12.3–15.4)
GLOBULIN SER CALC-MCNC: 1.7 GM/DL
GLUCOSE SERPL-MCNC: 94 MG/DL (ref 65–99)
HBA1C MFR BLD: 5.7 % (ref 4.8–5.6)
HCT VFR BLD AUTO: 41.7 % (ref 34–46.6)
HDLC SERPL-MCNC: 56 MG/DL (ref 40–60)
HGB BLD-MCNC: 13.6 G/DL (ref 12–15.9)
IMM GRANULOCYTES # BLD AUTO: 0.01 10*3/MM3 (ref 0–0.05)
IMM GRANULOCYTES NFR BLD AUTO: 0.2 % (ref 0–0.5)
LDLC SERPL CALC-MCNC: 87 MG/DL (ref 0–100)
LDLC/HDLC SERPL: 1.48 {RATIO}
LYMPHOCYTES # BLD AUTO: 1.6 10*3/MM3 (ref 0.7–3.1)
LYMPHOCYTES NFR BLD AUTO: 28.9 % (ref 19.6–45.3)
MCH RBC QN AUTO: 30.5 PG (ref 26.6–33)
MCHC RBC AUTO-ENTMCNC: 32.6 G/DL (ref 31.5–35.7)
MCV RBC AUTO: 93.5 FL (ref 79–97)
MONOCYTES # BLD AUTO: 0.43 10*3/MM3 (ref 0.1–0.9)
MONOCYTES NFR BLD AUTO: 7.8 % (ref 5–12)
NEUTROPHILS # BLD AUTO: 3.36 10*3/MM3 (ref 1.7–7)
NEUTROPHILS NFR BLD AUTO: 60.8 % (ref 42.7–76)
NRBC BLD AUTO-RTO: 0 /100 WBC (ref 0–0.2)
PLATELET # BLD AUTO: 273 10*3/MM3 (ref 140–450)
POTASSIUM SERPL-SCNC: 3.8 MMOL/L (ref 3.5–5.2)
PROT SERPL-MCNC: 6.3 G/DL (ref 6–8.5)
RBC # BLD AUTO: 4.46 10*6/MM3 (ref 3.77–5.28)
SODIUM SERPL-SCNC: 141 MMOL/L (ref 136–145)
TRIGL SERPL-MCNC: 167 MG/DL (ref 0–150)
TSH SERPL DL<=0.005 MIU/L-ACNC: 1.84 UIU/ML (ref 0.27–4.2)
VLDLC SERPL CALC-MCNC: 29 MG/DL (ref 5–40)
WBC # BLD AUTO: 5.53 10*3/MM3 (ref 3.4–10.8)

## 2024-03-04 ENCOUNTER — OFFICE VISIT (OUTPATIENT)
Dept: INTERNAL MEDICINE | Facility: CLINIC | Age: 74
End: 2024-03-04
Payer: MEDICARE

## 2024-03-04 VITALS
WEIGHT: 184.5 LBS | BODY MASS INDEX: 31.5 KG/M2 | OXYGEN SATURATION: 98 % | DIASTOLIC BLOOD PRESSURE: 82 MMHG | TEMPERATURE: 98.1 F | HEART RATE: 74 BPM | SYSTOLIC BLOOD PRESSURE: 116 MMHG | HEIGHT: 64 IN

## 2024-03-04 DIAGNOSIS — Z00.00 MEDICARE ANNUAL WELLNESS VISIT, SUBSEQUENT: Primary | ICD-10-CM

## 2024-03-04 DIAGNOSIS — E03.9 ACQUIRED HYPOTHYROIDISM: ICD-10-CM

## 2024-03-04 DIAGNOSIS — Z00.00 HEALTH CARE MAINTENANCE: ICD-10-CM

## 2024-03-04 DIAGNOSIS — E78.5 HYPERLIPIDEMIA, UNSPECIFIED HYPERLIPIDEMIA TYPE: ICD-10-CM

## 2024-03-04 DIAGNOSIS — I10 ESSENTIAL HYPERTENSION: ICD-10-CM

## 2024-03-04 DIAGNOSIS — R05.1 ACUTE COUGH: ICD-10-CM

## 2024-03-04 LAB
EXPIRATION DATE: NORMAL
FLUAV AG UPPER RESP QL IA.RAPID: NOT DETECTED
FLUBV AG UPPER RESP QL IA.RAPID: NOT DETECTED
INTERNAL CONTROL: NORMAL
Lab: NORMAL
SARS-COV-2 AG UPPER RESP QL IA.RAPID: NOT DETECTED

## 2024-03-04 RX ORDER — OSELTAMIVIR PHOSPHATE 75 MG/1
75 CAPSULE ORAL 2 TIMES DAILY
Qty: 10 CAPSULE | Refills: 0 | Status: SHIPPED | OUTPATIENT
Start: 2024-03-04

## 2024-03-04 RX ORDER — MELOXICAM 7.5 MG/1
TABLET ORAL
Qty: 60 TABLET | Refills: 5 | Status: SHIPPED | OUTPATIENT
Start: 2024-03-04

## 2024-03-04 NOTE — PROGRESS NOTES
The ABCs of the Annual Wellness Visit  [place] to Medicare Visit    Subjective     Dian Jefferson is a 74 y.o. female who presents for a  Welcome to Medicare Visit.    The following portions of the patient's history were reviewed and   updated as appropriate: allergies, current medications, past family history, past medical history, past social history, past surgical history, and problem list.     Compared to one year ago, the patient feels her physical   health is the same.     Compared to one year ago, the patient feels her mental   health is the same.    Recent Hospitalizations:  She was not admitted to the hospital during the last year.       Current Medical Providers:  Patient Care Team:  Mallika Stewart MD as PCP - General (Internal Medicine)  Britney Cole MD as Consulting Physician (Obstetrics and Gynecology)  Terra Paul APRN as Nurse Practitioner (Pain Medicine)    Outpatient Medications Prior to Visit   Medication Sig Dispense Refill    baclofen (LIORESAL) 10 MG tablet Take 1 tablet by mouth 3 (Three) Times a Day. 30 tablet 0    cetirizine (ZyrTEC) 10 MG tablet Take 1 tablet by mouth Daily.      Cholecalciferol (VITAMIN D3) 2000 UNITS capsule Take 1 capsule by mouth Daily.      ezetimibe (ZETIA) 10 MG tablet Take 1 tablet by mouth once daily 90 tablet 3    lidocaine (XYLOCAINE) 5 % ointment Apply 1 application topically to the appropriate area as directed Every 2 (Two) Hours As Needed for Mild Pain. 30 each 3    lisinopril (PRINIVIL,ZESTRIL) 10 MG tablet Take 1 tablet by mouth Daily. D/c the 20mg dose 90 tablet 1    meloxicam (Mobic) 7.5 MG tablet 1-2 tabs daily as needed 60 tablet 2    promethazine-dextromethorphan (PROMETHAZINE-DM) 6.25-15 MG/5ML syrup Take 5 mL by mouth 4 (Four) Times a Day As Needed for Cough. 180 mL 0    rosuvastatin (CRESTOR) 40 MG tablet TAKE 1 TABLET BY MOUTH ONCE DAILY AT NIGHT 90 tablet 1    Synthroid 25 MCG tablet Take 1 tablet by mouth once daily 90 tablet 1     "vilazodone (VIIBRYD) 40 MG tablet tablet Take 1 tablet by mouth once daily 90 tablet 1    Hydrocod Tony-Chlorphe Tony ER (TUSSIONEX PENNKINETIC) 10-8 MG/5ML ER suspension Take 5 mL by mouth Every 12 (Twelve) Hours As Needed for Cough. 140 mL 0     No facility-administered medications prior to visit.       No opioid medication identified on active medication list. I have reviewed chart for other potential  high risk medication/s and harmful drug interactions in the elderly.        Aspirin is not on active medication list.  Aspirin use is not indicated based on review of current medical condition/s. Risk of harm outweighs potential benefits.  .    Patient Active Problem List   Diagnosis    Anxiety    Gastroesophageal reflux disease    Menopausal flushing    Essential hypertension    Hyperlipidemia    Hypothyroidism    Abnormal mammogram    Breast implant rupture    Vitamin D deficiency    Tubular adenoma of colon    Sigmoid diverticulosis    Internal hemorrhoids    DDD (degenerative disc disease), lumbar    Spinal stenosis, lumbar region, with neurogenic claudication    Spondylosis of lumbar region without myelopathy or radiculopathy    Chronic bilateral low back pain without sciatica    Personal history of colonic polyps    Nuclear cataract    Sacroiliac joint dysfunction     Advance Care Planning   Advance Care Planning     Advance Directive is not on file.  ACP discussion was held with the patient during this visit. Patient has an advance directive (not in EMR), copy requested.       Objective   Vitals:    03/04/24 1346   BP: 116/82   BP Location: Left arm   Patient Position: Sitting   Pulse: 74   Temp: 98.1 °F (36.7 °C)   TempSrc: Oral   SpO2: 98%   Weight: 83.7 kg (184 lb 8 oz)   Height: 162.6 cm (64\")     Estimated body mass index is 31.67 kg/m² as calculated from the following:    Height as of this encounter: 162.6 cm (64\").    Weight as of this encounter: 83.7 kg (184 lb 8 oz).    BMI is >= 30 and <35. (Class " 1 Obesity). The following options were offered after discussion;: exercise counseling/recommendations and nutrition counseling/recommendations      Does the patient have evidence of cognitive impairment?   No    Lab Results   Component Value Date    CHLPL 172 02/29/2024    TRIG 167 (H) 02/29/2024    HDL 56 02/29/2024    LDL 87 02/29/2024    VLDL 29 02/29/2024    HGBA1C 5.70 (H) 02/29/2024       Procedures       HEALTH RISK ASSESSMENT    Smoking Status:  Social History     Tobacco Use   Smoking Status Never   Smokeless Tobacco Never     Alcohol Consumption:  Social History     Substance and Sexual Activity   Alcohol Use Yes    Comment: OCC. USE       Fall Risk Screen:    JEFFREY Fall Risk Assessment has not been completed.    Depression Screen:       8/17/2023     3:28 PM   PHQ-2/PHQ-9 Depression Screening   Little Interest or Pleasure in Doing Things 0-->not at all   Feeling Down, Depressed or Hopeless 0-->not at all   PHQ-9: Brief Depression Severity Measure Score 0       Health Habits and Functional and Cognitive Screening:      3/3/2024     3:16 PM   Functional & Cognitive Status   Do you have difficulty preparing food and eating? No   Do you have difficulty bathing yourself, getting dressed or grooming yourself? No   Do you have difficulty using the toilet? No   Do you have difficulty moving around from place to place? No   Do you have trouble with steps or getting out of a bed or a chair? No   Current Diet Well Balanced Diet   Dental Exam Up to date   Eye Exam Up to date   Exercise (times per week) 5 times per week   Current Exercises Include Walking   Do you need help using the phone?  No   Are you deaf or do you have serious difficulty hearing?  No   Do you need help to go to places out of walking distance? No   Do you need help shopping? No   Do you need help preparing meals?  No   Do you need help with housework?  No   Do you need help with laundry? No   Do you need help taking your medications? No   Do you  need help managing money? No   Do you ever drive or ride in a car without wearing a seat belt? No   Have you felt unusual stress, anger or loneliness in the last month? No   Who do you live with? Spouse   If you need help, do you have trouble finding someone available to you? Yes   Have you been bothered in the last four weeks by sexual problems? No   Do you have difficulty concentrating, remembering or making decisions? No       Visual Acuity:    No results found.    Age-appropriate Screening Schedule:  Refer to the list below for future screening recommendations based on patient's age, sex and/or medical conditions. Orders for these recommended tests are listed in the plan section. The patient has been provided with a written plan.    Health Maintenance   Topic Date Due    DXA SCAN  Never done    RSV Vaccine - Adults (1 - 1-dose 60+ series) Never done    Pneumococcal Vaccine 65+ (1 of 1 - PCV) Never done    INFLUENZA VACCINE  08/01/2023    COVID-19 Vaccine (1 - 2023-24 season) Never done    ANNUAL WELLNESS VISIT  02/09/2024    BMI FOLLOWUP  02/09/2024    LIPID PANEL  02/28/2025    COLORECTAL CANCER SCREENING  03/07/2025    MAMMOGRAM  04/12/2025    TDAP/TD VACCINES (2 - Td or Tdap) 07/13/2027    HEPATITIS C SCREENING  Completed    PAP SMEAR  Discontinued    ZOSTER VACCINE  Discontinued        CMS Preventative Services Quick Reference  Risk Factors Identified During Encounter    Inactivity/Sedentary: Patient was advised to exercise at least 150 minutes a week per CDC recommendations.  The above risks/problems have been discussed with the patient.  Pertinent information has been shared with the patient in the After Visit Summary.    Follow Up:   Initial Medicare Visit in one year    An After Visit Summary and PPPS were made available to the patient.      Additional E&M Note during same encounter follows:  Patient has multiple medical problems which are significant and separately identifiable that require additional  "work above and beyond the Medicare Wellness Visit.      Chief Complaint  Generalized Body Aches, Cough, and Headache    Subjective        Cough  Associated symptoms include headaches.   Headache    Dian Jefferson is also being seen today for fu with [place]  Pt has been taking BP meds as prescribed without any problems.  No HA  No episodes of orthostasis  Pt has been taking cholesterol meds as prescribed.  No difficulties with myalgias.   Pt has been doing well with thyroid meds.  Taking as perscribed without any complications  Her  was dx with flu last week  she had some myalgia over the weekend  no fever  slight cough now better but she did take some of her husbands tamiflu  Review of Systems   Respiratory:  Positive for cough.        Objective   Vital Signs:  /82 (BP Location: Left arm, Patient Position: Sitting)   Pulse 74   Temp 98.1 °F (36.7 °C) (Oral)   Ht 162.6 cm (64\")   Wt 83.7 kg (184 lb 8 oz)   SpO2 98%   BMI 31.67 kg/m²     Physical Exam  Vitals reviewed.   Constitutional:       Appearance: She is well-developed.   HENT:      Head: Normocephalic and atraumatic.      Right Ear: External ear normal.      Left Ear: External ear normal.   Eyes:      Conjunctiva/sclera: Conjunctivae normal.      Pupils: Pupils are equal, round, and reactive to light.   Neck:      Thyroid: No thyromegaly.      Trachea: No tracheal deviation.   Cardiovascular:      Rate and Rhythm: Normal rate and regular rhythm.      Heart sounds: Normal heart sounds.   Pulmonary:      Effort: Pulmonary effort is normal.      Breath sounds: Normal breath sounds.   Abdominal:      General: Bowel sounds are normal. There is no distension.      Palpations: Abdomen is soft.      Tenderness: There is no abdominal tenderness.   Musculoskeletal:         General: No deformity. Normal range of motion.      Cervical back: Normal range of motion.   Skin:     General: Skin is warm and dry.   Neurological:      Mental Status: She is alert and " oriented to person, place, and time.   Psychiatric:         Behavior: Behavior normal.         Thought Content: Thought content normal.         Judgment: Judgment normal.        The following data was reviewed by: Mallika Stewart MD on 03/04/2024:  Common labs          8/3/2023    15:22 2/29/2024    14:22   Common Labs   Glucose 109  94    BUN 12  11    Creatinine 0.96  0.90    Sodium 142  141    Potassium 4.2  3.8    Chloride 108  107    Calcium 9.8  9.5    Total Protein 6.5  6.3    Albumin 4.6  4.6    Total Bilirubin 0.5  0.8    Alkaline Phosphatase 55  51    AST (SGOT) 26  27    ALT (SGPT) 43  40    WBC 5.12  5.53    Hemoglobin 13.1  13.6    Hematocrit 39.6  41.7    Platelets 302  273    Total Cholesterol 164  172    Triglycerides 196  167    HDL Cholesterol 49  56    LDL Cholesterol  82  87    Hemoglobin A1C 5.90  5.70              Assessment and Plan   There are no diagnoses linked to this encounter.         Follow Up   No follow-ups on file.  Patient was given instructions and counseling regarding her condition or for health maintenance advice. Please see specific information pulled into the AVS if appropriate.      Depression- a little worse right now with the reent death of her sister  suddenly.  She has been sad with this  Flu exposure  she has started the tamiflu(it was her husbands)  I am sending in a rx for her  flu test neg today  taking daily until gone  HTN-  ok with current meds  HPL-  ok with crestor  5.  Hypothyroidism-  she has been stable on current meds

## 2024-03-21 RX ORDER — LISINOPRIL 10 MG/1
10 TABLET ORAL DAILY
Qty: 90 TABLET | Refills: 1 | Status: SHIPPED | OUTPATIENT
Start: 2024-03-21

## 2024-03-21 RX ORDER — LEVOTHYROXINE SODIUM 25 MCG
TABLET ORAL
Qty: 90 TABLET | Refills: 1 | Status: SHIPPED | OUTPATIENT
Start: 2024-03-21

## 2024-04-15 ENCOUNTER — APPOINTMENT (OUTPATIENT)
Dept: WOMENS IMAGING | Facility: HOSPITAL | Age: 74
End: 2024-04-15
Payer: MEDICARE

## 2024-04-15 PROCEDURE — 77067 SCR MAMMO BI INCL CAD: CPT | Performed by: RADIOLOGY

## 2024-04-15 PROCEDURE — 77063 BREAST TOMOSYNTHESIS BI: CPT | Performed by: RADIOLOGY

## 2024-04-17 DIAGNOSIS — Z12.31 ENCOUNTER FOR SCREENING MAMMOGRAM FOR MALIGNANT NEOPLASM OF BREAST: Primary | ICD-10-CM

## 2024-05-03 RX ORDER — VILAZODONE HYDROCHLORIDE 40 MG/1
TABLET ORAL
Qty: 90 TABLET | Refills: 1 | Status: SHIPPED | OUTPATIENT
Start: 2024-05-03

## 2024-05-07 ENCOUNTER — TELEPHONE (OUTPATIENT)
Dept: INTERNAL MEDICINE | Facility: CLINIC | Age: 74
End: 2024-05-07
Payer: MEDICARE

## 2024-05-10 DIAGNOSIS — E78.5 HYPERLIPIDEMIA, UNSPECIFIED HYPERLIPIDEMIA TYPE: ICD-10-CM

## 2024-05-10 RX ORDER — ROSUVASTATIN CALCIUM 40 MG/1
TABLET, COATED ORAL
Qty: 90 TABLET | Refills: 0 | Status: SHIPPED | OUTPATIENT
Start: 2024-05-10

## 2024-07-30 ENCOUNTER — HOSPITAL ENCOUNTER (OUTPATIENT)
Dept: GENERAL RADIOLOGY | Facility: HOSPITAL | Age: 74
Discharge: HOME | End: 2024-07-30
Admitting: INTERNAL MEDICINE
Payer: MEDICARE

## 2024-07-30 ENCOUNTER — OFFICE VISIT (OUTPATIENT)
Dept: INTERNAL MEDICINE | Facility: CLINIC | Age: 74
End: 2024-07-30
Payer: MEDICARE

## 2024-07-30 VITALS
HEART RATE: 67 BPM | SYSTOLIC BLOOD PRESSURE: 104 MMHG | DIASTOLIC BLOOD PRESSURE: 66 MMHG | HEIGHT: 64 IN | TEMPERATURE: 97.8 F | BODY MASS INDEX: 31.18 KG/M2 | OXYGEN SATURATION: 98 % | WEIGHT: 182.6 LBS

## 2024-07-30 DIAGNOSIS — M54.2 NECK PAIN: Primary | ICD-10-CM

## 2024-07-30 PROCEDURE — 1159F MED LIST DOCD IN RCRD: CPT | Performed by: INTERNAL MEDICINE

## 2024-07-30 PROCEDURE — 72040 X-RAY EXAM NECK SPINE 2-3 VW: CPT

## 2024-07-30 PROCEDURE — 99214 OFFICE O/P EST MOD 30 MIN: CPT | Performed by: INTERNAL MEDICINE

## 2024-07-30 PROCEDURE — 3078F DIAST BP <80 MM HG: CPT | Performed by: INTERNAL MEDICINE

## 2024-07-30 PROCEDURE — 3074F SYST BP LT 130 MM HG: CPT | Performed by: INTERNAL MEDICINE

## 2024-07-30 PROCEDURE — 1160F RVW MEDS BY RX/DR IN RCRD: CPT | Performed by: INTERNAL MEDICINE

## 2024-07-30 PROCEDURE — 1125F AMNT PAIN NOTED PAIN PRSNT: CPT | Performed by: INTERNAL MEDICINE

## 2024-07-30 RX ORDER — METHYLPREDNISOLONE 4 MG/1
TABLET ORAL
Qty: 21 TABLET | Refills: 0 | Status: SHIPPED | OUTPATIENT
Start: 2024-07-30

## 2024-07-30 NOTE — PROGRESS NOTES
Subjective   Dian Jefferson is a 74 y.o. female.   Neck strain for three week    Neck Pain        She says it started have left shoulder and neck pain 3 weeks  it has gradually been getting worse some relief with PT  some relief with exedrine temporarily  Patient has no radiation of pain or weakness into her arms and hands..  Patient has not had pain like this in the past.  She denies any trauma    The following portions of the patient's history were reviewed and updated as appropriate: allergies, current medications, past family history, past medical history, past social history, past surgical history, and problem list.  No history of neck issues  Review of Systems   Musculoskeletal:  Positive for neck pain.       Objective   Physical Exam  Vitals reviewed.   Constitutional:       Appearance: She is well-developed.   HENT:      Head: Normocephalic and atraumatic.      Right Ear: External ear normal.      Left Ear: External ear normal.   Eyes:      Conjunctiva/sclera: Conjunctivae normal.      Pupils: Pupils are equal, round, and reactive to light.   Neck:      Thyroid: No thyromegaly.      Trachea: No tracheal deviation.   Cardiovascular:      Rate and Rhythm: Normal rate and regular rhythm.      Heart sounds: Normal heart sounds.   Pulmonary:      Effort: Pulmonary effort is normal.      Breath sounds: Normal breath sounds.   Abdominal:      General: Bowel sounds are normal. There is no distension.      Palpations: Abdomen is soft.      Tenderness: There is no abdominal tenderness.   Musculoskeletal:         General: No deformity. Normal range of motion.      Cervical back: Normal range of motion.   Skin:     General: Skin is warm and dry.   Neurological:      Mental Status: She is alert and oriented to person, place, and time.   Psychiatric:         Behavior: Behavior normal.         Thought Content: Thought content normal.         Judgment: Judgment normal.         Vitals:    07/30/24 1430   BP: 104/66   Pulse:  67   Temp: 97.8 °F (36.6 °C)   SpO2: 98%     Body mass index is 31.34 kg/m².         Assessment & Plan   Diagnoses and all orders for this visit:    1. Neck pain (Primary)  -     XR Spine Cervical 2 or 3 View    Other orders  -     methylPREDNISolone (MEDROL) 4 MG dose pack; Take as directed on package instructions.  Dispense: 21 tablet; Refill: 0       Neck pain radiating into the left shoulder  -  try medrol dose pack and get a steroid she did not think the baclofen helped so she will not take that but she has some Robaxin at home that I said she could try    she does have a new pillow trying to figure out which one help

## 2024-08-02 DIAGNOSIS — E78.5 HYPERLIPIDEMIA, UNSPECIFIED HYPERLIPIDEMIA TYPE: ICD-10-CM

## 2024-08-05 RX ORDER — ROSUVASTATIN CALCIUM 40 MG/1
TABLET, COATED ORAL
Qty: 90 TABLET | Refills: 1 | Status: SHIPPED | OUTPATIENT
Start: 2024-08-05

## 2024-08-07 DIAGNOSIS — E78.5 HYPERLIPIDEMIA, UNSPECIFIED HYPERLIPIDEMIA TYPE: ICD-10-CM

## 2024-08-08 RX ORDER — ROSUVASTATIN CALCIUM 40 MG/1
TABLET, COATED ORAL
Qty: 90 TABLET | Refills: 0 | OUTPATIENT
Start: 2024-08-08

## 2024-08-16 ENCOUNTER — TELEPHONE (OUTPATIENT)
Dept: INTERNAL MEDICINE | Facility: CLINIC | Age: 74
End: 2024-08-16
Payer: MEDICARE

## 2024-08-19 NOTE — TELEPHONE ENCOUNTER
LMOM  informing pt that she should do physical therapy. She is going to call or my chart with a location

## 2024-08-21 ENCOUNTER — TELEPHONE (OUTPATIENT)
Dept: INTERNAL MEDICINE | Facility: CLINIC | Age: 74
End: 2024-08-21
Payer: MEDICARE

## 2024-08-21 DIAGNOSIS — M54.2 NECK PAIN: Primary | ICD-10-CM

## 2024-08-21 NOTE — TELEPHONE ENCOUNTER
"Caller: Dian Jefferson \"ODELL\"    Relationship: Self    Best call back number: [phone]    What specialty or service is being requested: PHYSICAL THERAPY    What is the provider, practice or medical service name: [facility] (DIRECTLY [address] FROM [facility])    What is the office location: [facility]    What is the office phone number: [phone]    Any additional details: PATIENT STATES THAT SHE WOULD LIKE TO INFORM MIGUEL THAT HER PREFERRED LOCATION FOR HER PHYSICAL THERAPY IS [facility].       "

## 2024-08-28 DIAGNOSIS — E78.5 HYPERLIPIDEMIA, UNSPECIFIED HYPERLIPIDEMIA TYPE: Primary | ICD-10-CM

## 2024-08-28 DIAGNOSIS — R73.09 ELEVATED GLUCOSE: ICD-10-CM

## 2024-08-28 DIAGNOSIS — E03.9 ACQUIRED HYPOTHYROIDISM: ICD-10-CM

## 2024-08-28 DIAGNOSIS — I10 ESSENTIAL HYPERTENSION: ICD-10-CM

## 2024-08-31 LAB
ALBUMIN SERPL-MCNC: 4.5 G/DL (ref 3.5–5.2)
ALBUMIN/GLOB SERPL: 2.3 G/DL
ALP SERPL-CCNC: 61 U/L (ref 39–117)
ALT SERPL-CCNC: 45 U/L (ref 1–33)
AST SERPL-CCNC: 33 U/L (ref 1–32)
BASOPHILS # BLD AUTO: 0.03 10*3/MM3 (ref 0–0.2)
BASOPHILS NFR BLD AUTO: 0.6 % (ref 0–1.5)
BILIRUB SERPL-MCNC: 0.4 MG/DL (ref 0–1.2)
BUN SERPL-MCNC: 16 MG/DL (ref 8–23)
BUN/CREAT SERPL: 17.6 (ref 7–25)
CALCIUM SERPL-MCNC: 9.4 MG/DL (ref 8.6–10.5)
CHLORIDE SERPL-SCNC: 107 MMOL/L (ref 98–107)
CHOLEST SERPL-MCNC: 182 MG/DL (ref 0–200)
CO2 SERPL-SCNC: 25.4 MMOL/L (ref 22–29)
CREAT SERPL-MCNC: 0.91 MG/DL (ref 0.57–1)
EGFRCR SERPLBLD CKD-EPI 2021: 67.2 ML/MIN/1.73
EOSINOPHIL # BLD AUTO: 0.22 10*3/MM3 (ref 0–0.4)
EOSINOPHIL NFR BLD AUTO: 4.7 % (ref 0.3–6.2)
ERYTHROCYTE [DISTWIDTH] IN BLOOD BY AUTOMATED COUNT: 12.9 % (ref 12.3–15.4)
GLOBULIN SER CALC-MCNC: 2 GM/DL
GLUCOSE SERPL-MCNC: 100 MG/DL (ref 65–99)
HBA1C MFR BLD: 5.8 % (ref 4.8–5.6)
HCT VFR BLD AUTO: 40.4 % (ref 34–46.6)
HDLC SERPL-MCNC: 51 MG/DL (ref 40–60)
HGB BLD-MCNC: 13.3 G/DL (ref 12–15.9)
IMM GRANULOCYTES # BLD AUTO: 0.01 10*3/MM3 (ref 0–0.05)
IMM GRANULOCYTES NFR BLD AUTO: 0.2 % (ref 0–0.5)
LDLC SERPL CALC-MCNC: 97 MG/DL (ref 0–100)
LDLC/HDLC SERPL: 1.78 {RATIO}
LYMPHOCYTES # BLD AUTO: 1.71 10*3/MM3 (ref 0.7–3.1)
LYMPHOCYTES NFR BLD AUTO: 36.5 % (ref 19.6–45.3)
MCH RBC QN AUTO: 30.7 PG (ref 26.6–33)
MCHC RBC AUTO-ENTMCNC: 32.9 G/DL (ref 31.5–35.7)
MCV RBC AUTO: 93.3 FL (ref 79–97)
MONOCYTES # BLD AUTO: 0.43 10*3/MM3 (ref 0.1–0.9)
MONOCYTES NFR BLD AUTO: 9.2 % (ref 5–12)
NEUTROPHILS # BLD AUTO: 2.29 10*3/MM3 (ref 1.7–7)
NEUTROPHILS NFR BLD AUTO: 48.8 % (ref 42.7–76)
NRBC BLD AUTO-RTO: 0 /100 WBC (ref 0–0.2)
PLATELET # BLD AUTO: 312 10*3/MM3 (ref 140–450)
POTASSIUM SERPL-SCNC: 4.1 MMOL/L (ref 3.5–5.2)
PROT SERPL-MCNC: 6.5 G/DL (ref 6–8.5)
RBC # BLD AUTO: 4.33 10*6/MM3 (ref 3.77–5.28)
SODIUM SERPL-SCNC: 143 MMOL/L (ref 136–145)
TRIGL SERPL-MCNC: 201 MG/DL (ref 0–150)
TSH SERPL DL<=0.005 MIU/L-ACNC: 2.87 UIU/ML (ref 0.27–4.2)
VLDLC SERPL CALC-MCNC: 34 MG/DL (ref 5–40)
WBC # BLD AUTO: 4.69 10*3/MM3 (ref 3.4–10.8)

## 2024-09-03 ENCOUNTER — OFFICE VISIT (OUTPATIENT)
Dept: PAIN MEDICINE | Facility: CLINIC | Age: 74
End: 2024-09-03
Payer: MEDICARE

## 2024-09-03 VITALS
TEMPERATURE: 96.6 F | DIASTOLIC BLOOD PRESSURE: 90 MMHG | SYSTOLIC BLOOD PRESSURE: 122 MMHG | HEART RATE: 75 BPM | BODY MASS INDEX: 31.04 KG/M2 | WEIGHT: 181.8 LBS | OXYGEN SATURATION: 96 % | HEIGHT: 64 IN

## 2024-09-03 DIAGNOSIS — M50.30 DDD (DEGENERATIVE DISC DISEASE), CERVICAL: ICD-10-CM

## 2024-09-03 DIAGNOSIS — M70.61 TROCHANTERIC BURSITIS OF BOTH HIPS: ICD-10-CM

## 2024-09-03 DIAGNOSIS — M70.62 TROCHANTERIC BURSITIS OF BOTH HIPS: ICD-10-CM

## 2024-09-03 DIAGNOSIS — M47.816 LUMBAR FACET ARTHROPATHY: Primary | ICD-10-CM

## 2024-09-03 DIAGNOSIS — M53.3 SACROILIAC JOINT DYSFUNCTION: ICD-10-CM

## 2024-09-03 PROCEDURE — 3080F DIAST BP >= 90 MM HG: CPT | Performed by: NURSE PRACTITIONER

## 2024-09-03 PROCEDURE — 1125F AMNT PAIN NOTED PAIN PRSNT: CPT | Performed by: NURSE PRACTITIONER

## 2024-09-03 PROCEDURE — 1159F MED LIST DOCD IN RCRD: CPT | Performed by: NURSE PRACTITIONER

## 2024-09-03 PROCEDURE — 3074F SYST BP LT 130 MM HG: CPT | Performed by: NURSE PRACTITIONER

## 2024-09-03 PROCEDURE — 99214 OFFICE O/P EST MOD 30 MIN: CPT | Performed by: NURSE PRACTITIONER

## 2024-09-03 PROCEDURE — 1160F RVW MEDS BY RX/DR IN RCRD: CPT | Performed by: NURSE PRACTITIONER

## 2024-09-03 RX ORDER — METHOCARBAMOL 750 MG/1
750 TABLET, FILM COATED ORAL 3 TIMES DAILY PRN
Qty: 90 TABLET | Refills: 1 | Status: SHIPPED | OUTPATIENT
Start: 2024-09-03

## 2024-09-03 NOTE — PROGRESS NOTES
CHIEF COMPLAINT  Back, left neck/shoulder and bilateral hip pain.     Subjective   Dian Jefferson is a 74 y.o. female  who presents to the office for follow-up of procedure.  She completed a SACROILIAC INJECTION left on  12/4/23 performed by Dr. Mirza for management of back pain. Patient reports 80% relief from the procedure for several months.    She reports worsening neck and left shoulder pain.  She is starting PT this week. Not improving with NSAID's, Excedrin, Muscle Relaxants, massage therapy.  The pain  is worse with movement of the head.  Previous benefit with Methocarbamol.      Procedures ---   RFA bilateral L2-L5 on 9/11/2023 --- 80% relief close to a year  Bilateral GT bursa injection 8/24/2023 --- significant relief which is ongoing   Left SI joint injection 12/7/2022 --- 80% relief 3-4 months  Bilateral GT bursa injection on 10/11/2022 --- 85% relief which is mostly ongoing  Radiofrequency ablation of bilateral L2-L5 on  3/30/2022 --- 50% relief x greater than one year     Back Pain  This is a chronic problem. The current episode started more than 1 month ago. The problem occurs daily. The problem has been waxing and waning since onset. The pain is present in the lumbar spine and sacro-iliac. The quality of the pain is described as aching and burning. The pain does not radiate. The pain is at a severity of 8/10. The symptoms are aggravated by standing (walking). Associated symptoms include weakness (left arm). Pertinent negatives include no abdominal pain, chest pain, dysuria, fever, headaches or numbness. She has tried analgesics (rest, laying down flat) for the symptoms. The treatment provided mild relief.      PEG Assessment   What number best describes your pain on average in the past week?8  What number best describes how, during the past week, pain has interfered with your enjoyment of life?10  What number best describes how, during the past week, pain has interfered with your general activity?   "8    Review of Pertinent Medical Data ---    The following portions of the patient's history were reviewed and updated as appropriate: allergies, current medications, past family history, past medical history, past social history, past surgical history, and problem list.    Review of Systems   Constitutional:  Negative for chills and fever.   Respiratory:  Negative for cough and shortness of breath.    Cardiovascular:  Negative for chest pain.   Gastrointestinal:  Negative for abdominal pain, constipation and diarrhea.   Genitourinary:  Negative for difficulty urinating and dysuria.   Musculoskeletal:  Positive for back pain and neck pain.   Neurological:  Positive for weakness (left arm). Negative for dizziness, light-headedness, numbness and headaches.   Psychiatric/Behavioral:  Negative for agitation.      I have reviewed and confirmed the accuracy of the ROS as documented by the MA/IRINEO/RN LIZABETH Rico     Vitals:    09/03/24 1548   BP: 122/90   BP Location: Left arm   Patient Position: Sitting   Pulse: 75   Temp: 96.6 °F (35.9 °C)   TempSrc: Temporal   SpO2: 96%   Weight: 82.5 kg (181 lb 12.8 oz)   Height: 162.6 cm (64\")   PainSc:   8   PainLoc: Shoulder         Objective   Physical Exam  Vitals and nursing note reviewed.   Constitutional:       General: She is not in acute distress.     Appearance: Normal appearance. She is not ill-appearing.   Pulmonary:      Effort: Pulmonary effort is normal. No respiratory distress.   Musculoskeletal:      Lumbar back: Tenderness and bony tenderness present. Negative right straight leg raise test and negative left straight leg raise test.      Right hip: Bony tenderness present.      Left hip: Bony tenderness present.      Comments: +bilateral SI joint tenderness, SI joint compression, thigh thrust, SHARIFA    Neurological:      Mental Status: She is alert and oriented to person, place, and time.      Motor: Motor function is intact.      Gait: Gait normal. "   Psychiatric:         Mood and Affect: Mood normal.         Behavior: Behavior normal.         Assessment & Plan   Diagnoses and all orders for this visit:    1. Lumbar facet arthropathy (Primary)    2. Sacroiliac joint dysfunction    3. DDD (degenerative disc disease), cervical  -     MRI Cervical Spine Without Contrast; Future    Other orders  -     methocarbamol (ROBAXIN) 750 MG tablet; Take 1 tablet by mouth 3 (Three) Times a Day As Needed for Muscle Spasms.  Dispense: 90 tablet; Refill: 1        --- Agree with PT regarding neck pain  --- MRI Cervical Spine    --- Bilateral SI joint injection     ----------  Education about Sacroiliac joint injections:  This Sacroiliac joint injection (blockade) we have suggested is intended for diagnostic purposes, with the intent of offering the patient Radiofrequency thermal rhizotomy (RF) of the sensory branches to the joint if the block is diagnostically effective.  The diagnostic blockade is necessary to determine the likelihood that RF therapy could be efficacious in providing long term relief to the patient.    In this procedure, the sacroiliac joint is aligned with imaging, and under image guidance a needle is placed with the needle tip into the joint.  The needle position is confirmed to be appropriately in the joint before injection of medication into the joint.  When xray fluoroscopy is used, contrast dye is used to confirm a proper arthrogram (i.e., outline of the joint).  When ultrasound is used, IV fluid (normal saline) is injected to see the flow of the fluid into the joint.  Once confirmed, then the medication can be injected into the joint.  Oftentimes this medication is a combination of local anesthetics (for diagnostic purposes) and also a steroid (to decrease irritation & inflammation in the joint, also known as sacroilitis).      Medically, a successful RF procedure should provide a patient with 50% pain relief or more for at least 6 months.  Clinical  experience suggests that successful patients receive relief more in the range of 12 months on average.  We also discussed that many patients receive therapeutic success from the intraarticular joint injection, and may not require RF ablation.  If a patient receives more than 8 weeks of relief from joint injection, then occasional repeat joint blocks for therapeutic purposes is a very reasonable alternative therapy.  This course of therapy is consistent with our LCDs according to our CMS  in the area, and therefore other insurance providers should follow accordingly.  We will monitor our patients to screen for these therapeutic responders and will offer RF therapy only when necessary.      We discussed that joint injections & also RF procedures are not without risks.  Best practices regarding anticoagulant use & neuraxial procedures will be respected.  Oftentimes a patient on an anticoagulant can be offered a joint injection safely, but again this is not risk-free, and such patients give consent with regards to this increased bleeding risk, which could cause problems including but not limited to worsening of pain, nerve damage, or muscle damage.  Patients that are ill or otherwise may be at risk for sepsis will not have their spines accessed by neuraxial injections of any type.  This patient will not be offered these therapies if there is an increased risk.   We discussed that there is a risk of postprocedural pain and also a risk of worsening of clinical picture with these procedures as with any neuraxial procedure.    ----------    --- Bilateral GT bursa injection in office  --- Will space out injections by at least 4 weeks   --- May consider Cervical interventions pending MRI and PT   --- Robaxin sent to pharmacy     Dian Jefferson reports a pain score of 8.  Given her pain assessment as noted, treatment options were discussed and the following options were decided upon as a follow-up plan to address  the patient's pain: home exercises and therapy and steroid injections.      --- Follow-up for procedures                     Dictated utilizing Dragon dictation.

## 2024-09-04 ENCOUNTER — PREP FOR SURGERY (OUTPATIENT)
Dept: SURGERY | Facility: SURGERY CENTER | Age: 74
End: 2024-09-04
Payer: MEDICARE

## 2024-09-04 DIAGNOSIS — M53.3 SACROILIAC JOINT DYSFUNCTION: Primary | ICD-10-CM

## 2024-09-05 ENCOUNTER — OFFICE VISIT (OUTPATIENT)
Dept: INTERNAL MEDICINE | Facility: CLINIC | Age: 74
End: 2024-09-05
Payer: MEDICARE

## 2024-09-05 VITALS
TEMPERATURE: 98 F | HEART RATE: 75 BPM | OXYGEN SATURATION: 97 % | BODY MASS INDEX: 31.29 KG/M2 | SYSTOLIC BLOOD PRESSURE: 106 MMHG | WEIGHT: 183.3 LBS | DIASTOLIC BLOOD PRESSURE: 74 MMHG | HEIGHT: 64 IN

## 2024-09-05 DIAGNOSIS — E03.9 ACQUIRED HYPOTHYROIDISM: Primary | ICD-10-CM

## 2024-09-05 DIAGNOSIS — I10 ESSENTIAL HYPERTENSION: ICD-10-CM

## 2024-09-05 DIAGNOSIS — R73.09 ELEVATED GLUCOSE: ICD-10-CM

## 2024-09-05 DIAGNOSIS — Z12.11 COLON CANCER SCREENING: ICD-10-CM

## 2024-09-05 DIAGNOSIS — M54.9 UPPER BACK PAIN: ICD-10-CM

## 2024-09-05 DIAGNOSIS — E78.5 HYPERLIPIDEMIA, UNSPECIFIED HYPERLIPIDEMIA TYPE: ICD-10-CM

## 2024-09-05 DIAGNOSIS — Z82.49 FAMILY HISTORY OF AORTIC DISSECTION: ICD-10-CM

## 2024-09-05 DIAGNOSIS — Z78.0 POST-MENOPAUSAL: ICD-10-CM

## 2024-09-05 PROCEDURE — 99214 OFFICE O/P EST MOD 30 MIN: CPT | Performed by: INTERNAL MEDICINE

## 2024-09-05 PROCEDURE — 1159F MED LIST DOCD IN RCRD: CPT | Performed by: INTERNAL MEDICINE

## 2024-09-05 PROCEDURE — 1125F AMNT PAIN NOTED PAIN PRSNT: CPT | Performed by: INTERNAL MEDICINE

## 2024-09-05 PROCEDURE — 1160F RVW MEDS BY RX/DR IN RCRD: CPT | Performed by: INTERNAL MEDICINE

## 2024-09-05 PROCEDURE — 3074F SYST BP LT 130 MM HG: CPT | Performed by: INTERNAL MEDICINE

## 2024-09-05 PROCEDURE — 3078F DIAST BP <80 MM HG: CPT | Performed by: INTERNAL MEDICINE

## 2024-09-05 RX ORDER — LIDOCAINE 50 MG/G
1 PATCH TOPICAL EVERY 24 HOURS
COMMUNITY

## 2024-09-05 NOTE — PROGRESS NOTES
Subjective   Dian Jefferson is a 74 y.o. female.   Fu with FL  Hyperlipidemia  Exacerbating diseases include hypothyroidism.   Hypertension    Hypothyroidism    Depression  Her past medical history is significant for depression.      She is seeing PT and getting some better  dry needling does seem to help  Pt has been taking cholesterol meds as prescribed.  No difficulties with myalgias.   Pt has been doing well with thyroid meds.  Taking as perscribed without any complications  Pt has been taking BP meds as prescribed without any problems.  No HA  No episodes of orthostasis    The following portions of the patient's history were reviewed and updated as appropriate: allergies, current medications, past family history, past medical history, past social history, past surgical history, and problem list.    Review of Systems    Objective   Physical Exam  Vitals reviewed.   Constitutional:       Appearance: She is well-developed.   HENT:      Head: Normocephalic and atraumatic.      Right Ear: External ear normal.      Left Ear: External ear normal.   Eyes:      Conjunctiva/sclera: Conjunctivae normal.      Pupils: Pupils are equal, round, and reactive to light.   Neck:      Thyroid: No thyromegaly.      Trachea: No tracheal deviation.   Cardiovascular:      Rate and Rhythm: Normal rate and regular rhythm.      Heart sounds: Normal heart sounds.   Pulmonary:      Effort: Pulmonary effort is normal.      Breath sounds: Normal breath sounds.   Abdominal:      General: Bowel sounds are normal. There is no distension.      Palpations: Abdomen is soft.      Tenderness: There is no abdominal tenderness.   Musculoskeletal:         General: No deformity. Normal range of motion.      Cervical back: Normal range of motion.   Skin:     General: Skin is warm and dry.   Neurological:      Mental Status: She is alert and oriented to person, place, and time.   Psychiatric:         Behavior: Behavior normal.         Thought Content:  Thought content normal.         Judgment: Judgment normal.         Vitals:    09/05/24 1439   BP: 106/74   Pulse: 75   Temp: 98 °F (36.7 °C)   SpO2: 97%     Body mass index is 31.45 kg/m².         Assessment & Plan   Diagnoses and all orders for this visit:    1. Acquired hypothyroidism (Primary)    2. Hyperlipidemia, unspecified hyperlipidemia type    3. Essential hypertension    4. Upper back pain    5. Post-menopausal  -     DEXA Bone Density Axial; Future     Upper back pain-  she is seeing PT and starting to get some better  Hypothyroidism-  ok with current meds  HTN-  ok with current meds  HPL-  ok with crestor

## 2024-09-12 RX ORDER — LEVOTHYROXINE SODIUM 25 MCG
TABLET ORAL
Qty: 90 TABLET | Refills: 1 | Status: SHIPPED | OUTPATIENT
Start: 2024-09-12

## 2024-09-12 RX ORDER — LISINOPRIL 10 MG/1
10 TABLET ORAL DAILY
Qty: 90 TABLET | Refills: 1 | Status: SHIPPED | OUTPATIENT
Start: 2024-09-12

## 2024-09-13 ENCOUNTER — HOSPITAL ENCOUNTER (OUTPATIENT)
Dept: MRI IMAGING | Facility: HOSPITAL | Age: 74
Discharge: HOME | End: 2024-09-13
Payer: MEDICARE

## 2024-09-13 DIAGNOSIS — M50.30 DDD (DEGENERATIVE DISC DISEASE), CERVICAL: ICD-10-CM

## 2024-09-13 PROCEDURE — 72141 MRI NECK SPINE W/O DYE: CPT

## 2024-09-17 ENCOUNTER — TRANSCRIBE ORDERS (OUTPATIENT)
Dept: SURGERY | Facility: SURGERY CENTER | Age: 74
End: 2024-09-17
Payer: MEDICARE

## 2024-09-17 DIAGNOSIS — Z41.9 SURGERY, ELECTIVE: Primary | ICD-10-CM

## 2024-09-18 ENCOUNTER — PREP FOR SURGERY (OUTPATIENT)
Dept: SURGERY | Facility: SURGERY CENTER | Age: 74
End: 2024-09-18
Payer: MEDICARE

## 2024-09-18 ENCOUNTER — PROCEDURE VISIT (OUTPATIENT)
Dept: PAIN MEDICINE | Facility: CLINIC | Age: 74
End: 2024-09-18
Payer: MEDICARE

## 2024-09-18 VITALS
HEART RATE: 68 BPM | WEIGHT: 183.8 LBS | HEIGHT: 64 IN | TEMPERATURE: 96.2 F | SYSTOLIC BLOOD PRESSURE: 122 MMHG | DIASTOLIC BLOOD PRESSURE: 88 MMHG | BODY MASS INDEX: 31.38 KG/M2 | OXYGEN SATURATION: 97 %

## 2024-09-18 DIAGNOSIS — M50.30 DDD (DEGENERATIVE DISC DISEASE), CERVICAL: ICD-10-CM

## 2024-09-18 DIAGNOSIS — M70.62 TROCHANTERIC BURSITIS OF BOTH HIPS: Primary | ICD-10-CM

## 2024-09-18 DIAGNOSIS — M70.61 TROCHANTERIC BURSITIS OF BOTH HIPS: Primary | ICD-10-CM

## 2024-09-18 DIAGNOSIS — M54.12 CERVICAL RADICULOPATHY: Primary | ICD-10-CM

## 2024-09-18 DIAGNOSIS — M54.12 CERVICAL RADICULOPATHY: ICD-10-CM

## 2024-09-18 PROCEDURE — 99214 OFFICE O/P EST MOD 30 MIN: CPT | Performed by: NURSE PRACTITIONER

## 2024-09-23 RX ORDER — EZETIMIBE 10 MG/1
TABLET ORAL
Qty: 90 TABLET | Refills: 1 | Status: SHIPPED | OUTPATIENT
Start: 2024-09-23

## 2024-09-24 ENCOUNTER — HOSPITAL ENCOUNTER (OUTPATIENT)
Dept: PAIN MEDICINE | Facility: HOSPITAL | Age: 74
Discharge: HOME | End: 2024-09-24
Payer: MEDICARE

## 2024-09-24 ENCOUNTER — HOSPITAL ENCOUNTER (OUTPATIENT)
Dept: GENERAL RADIOLOGY | Facility: HOSPITAL | Age: 74
Discharge: HOME | End: 2024-09-24
Payer: MEDICARE

## 2024-09-24 VITALS
RESPIRATION RATE: 18 BRPM | SYSTOLIC BLOOD PRESSURE: 114 MMHG | OXYGEN SATURATION: 99 % | WEIGHT: 180 LBS | DIASTOLIC BLOOD PRESSURE: 81 MMHG | HEART RATE: 57 BPM | BODY MASS INDEX: 30.73 KG/M2 | TEMPERATURE: 97.8 F | HEIGHT: 64 IN

## 2024-09-24 DIAGNOSIS — R52 PAIN: ICD-10-CM

## 2024-09-24 PROBLEM — M54.12 CERVICAL RADICULOPATHY: Status: ACTIVE | Noted: 2024-09-24

## 2024-09-24 PROCEDURE — 25510000001 IOPAMIDOL 61 % SOLUTION: Performed by: ANESTHESIOLOGY

## 2024-09-24 PROCEDURE — 25010000002 DEXAMETHASONE SODIUM PHOSPHATE 10 MG/ML SOLUTION: Performed by: ANESTHESIOLOGY

## 2024-09-24 PROCEDURE — 77002 NEEDLE LOCALIZATION BY XRAY: CPT

## 2024-09-24 PROCEDURE — 62321 NJX INTERLAMINAR CRV/THRC: CPT | Performed by: ANESTHESIOLOGY

## 2024-09-24 RX ORDER — LIDOCAINE HYDROCHLORIDE 10 MG/ML
5 INJECTION, SOLUTION EPIDURAL; INFILTRATION; INTRACAUDAL; PERINEURAL ONCE
Status: COMPLETED | OUTPATIENT
Start: 2024-09-24 | End: 2024-09-24

## 2024-09-24 RX ORDER — DEXAMETHASONE SODIUM PHOSPHATE 10 MG/ML
10 INJECTION, SOLUTION INTRAMUSCULAR; INTRAVENOUS ONCE
Status: COMPLETED | OUTPATIENT
Start: 2024-09-24 | End: 2024-09-24

## 2024-09-24 RX ORDER — IOPAMIDOL 612 MG/ML
3 INJECTION, SOLUTION INTRAVASCULAR
Status: COMPLETED | OUTPATIENT
Start: 2024-09-24 | End: 2024-09-24

## 2024-09-24 RX ORDER — SODIUM CHLORIDE 9 MG/ML
2 INJECTION, SOLUTION INTRAMUSCULAR; INTRAVENOUS; SUBCUTANEOUS ONCE
Status: COMPLETED | OUTPATIENT
Start: 2024-09-24 | End: 2024-09-24

## 2024-09-24 RX ORDER — DIAZEPAM 5 MG
5 TABLET ORAL ONCE
Status: COMPLETED | OUTPATIENT
Start: 2024-09-24 | End: 2024-09-24

## 2024-09-24 RX ADMIN — LIDOCAINE HYDROCHLORIDE 5 ML: 10 INJECTION, SOLUTION EPIDURAL; INFILTRATION; INTRACAUDAL; PERINEURAL at 11:13

## 2024-09-24 RX ADMIN — DEXAMETHASONE SODIUM PHOSPHATE 10 MG: 10 INJECTION INTRAMUSCULAR; INTRAVENOUS at 11:13

## 2024-09-24 RX ADMIN — IOPAMIDOL 3 ML: 612 INJECTION, SOLUTION INTRAVENOUS at 11:13

## 2024-09-24 RX ADMIN — DIAZEPAM 5 MG: 5 TABLET ORAL at 10:12

## 2024-09-24 RX ADMIN — SODIUM CHLORIDE 2 ML: 9 INJECTION, SOLUTION INTRAMUSCULAR; INTRAVENOUS; SUBCUTANEOUS at 11:14

## 2024-09-30 ENCOUNTER — TELEPHONE (OUTPATIENT)
Dept: PAIN MEDICINE | Facility: CLINIC | Age: 74
End: 2024-09-30

## 2024-09-30 NOTE — TELEPHONE ENCOUNTER
Ms. Jefferson called and left a message stating that she had a ALINA done on 9/24/2024 and she hurts just as much now as she did prior to the injection. It didn't help. She would like to know what can be done to help her pain?

## 2024-10-02 DIAGNOSIS — M50.30 DDD (DEGENERATIVE DISC DISEASE), CERVICAL: Primary | ICD-10-CM

## 2024-10-02 DIAGNOSIS — M54.12 CERVICAL RADICULOPATHY: ICD-10-CM

## 2024-10-02 NOTE — TELEPHONE ENCOUNTER
I spoke with Ms. Jefferson states that she is getting some relief at the base of her neck but no pain relief on the left side or into the left shoulder. She would like to have a referral sent to neurosurgery.

## 2024-10-03 ENCOUNTER — TELEPHONE (OUTPATIENT)
Dept: INTERNAL MEDICINE | Facility: CLINIC | Age: 74
End: 2024-10-03
Payer: MEDICARE

## 2024-10-03 DIAGNOSIS — M54.9 UPPER BACK PAIN: Primary | ICD-10-CM

## 2024-10-03 DIAGNOSIS — M54.2 NECK PAIN: ICD-10-CM

## 2024-10-03 NOTE — TELEPHONE ENCOUNTER
"  Caller: Dian Jefferson \"ODELL\"    Relationship: Self    Best call back number: [phone]     What is the medical concern/diagnosis:      What specialty or service is being requested:  ORTHOPEDIC    What is the provider, practice or medical service name: DR DANNY ARMSTRONG     What is the office location:      What is the office phone number:      Any additional details: PATIENT CALLED STATED SHE WAS SEEN BY THE PAIN MANAGEMENT THEY DID EPIDURAL ON NECK WEEK AGO TUESDAY AND DID NOT WORK.  SHE WANTS TO GET A REFERRAL FOR ORTHOPEDIC DOCTOR.  SHE SEEN DR DANNY ARMSTRONG BEFORE AND WOULD LIKE TO GET HIM AGAIN.  SHE IS CURRENTLY HAVING PAIN IN LEFT SHOULDER, ARM, BACK AND NECK.  SHE CALLED OFFICE THAT DONE THE EPIDURAL THEY WAS GOING TO GIVE HER A REFERRAL TO NEUROSURGEON BUT SHE WANTS TO SEE ORTHOPEDIC FIRST.  CAN DR DENG GIVE HER A REFERRAL FOR THE ORTHOPEDIC.              "

## 2024-10-14 ENCOUNTER — TELEPHONE (OUTPATIENT)
Dept: PAIN MEDICINE | Facility: CLINIC | Age: 74
End: 2024-10-14
Payer: MEDICARE

## 2024-10-14 NOTE — TELEPHONE ENCOUNTER
"Caller: Dian Jefferson \"ODELL\"    Relationship to patient: Self    Best call back number: [phone]    Patient is needing: COPY OF MRI ON DISC HER  HAS AN APPOINTMENT TODAY AND WOULD LIKE TO  - PLEASE REACH OUT AND ADVISE       "

## 2024-10-15 NOTE — TELEPHONE ENCOUNTER
Left message to call the office. We can not put MRI on disc but can provide a copy of the result note.

## 2024-10-22 NOTE — DISCHARGE INSTRUCTIONS
[facility] - Post-procedure Instructions          --  While there are no absolute restrictions, it is recommended that you do not perform strenuous activity today. In the morning, you may resume your level of activity as before your block.    --  If you have a band-aid at your injection site, please remove it later today. Observe the area for any redness, swelling, pus-like drainage, or a temperature over 101°. If any of these symptoms occur, please call your doctor at [phone]. If after office hours, leave a message and the on-call provider will return your call.    --  Ice may be applied to your injection site. It is recommended you avoid direct heat (heating pad; hot tub) for 1-2 days.    --  Call [facility] at [phone] if you experience persistent headache, persistent bleeding from the injection site, or severe pain not relieved by heat or oral medication.    --  Do not make important decisions today.    --  Due to the effects of the block and/or the I.V. Sedation, DO NOT drive or operate hazardous machinery for 12 hours.  Local anesthetics may cause numbness after procedure and precautions must be taken with regards to operating equipment as well as with walking, even if ambulating with assistance of another person or with an assistive device.    --  Do not drink alcohol for 12 hours.    -- You may return to work tomorrow, or as directed by your referring doctor.    --  Occasionally you may notice a slight increase in your pain after the procedure. This should start to improve within the next 24-48 hours. Radiofrequency ablation procedure pain may last 3-4 weeks.    --  It may take as long as 3-4 days before you notice a gradual improvement in your pain and/or other symptoms.    -- You may continue to take your prescribed pain medication as needed.    --  Some normal possible side effects of steroid use could include fluid retention, increased blood sugar, dull headache,  increased sweating, increased appetite, mood swings and flushing.    --  Diabetics are recommended to watch their blood glucose level closely for 24-48 hours after the injection.    --  Must stay in PACU for 20 min upon arrival and prove no leg weakness before being discharged.    --  IN THE EVENT OF A LIFE THREATENING EMERGENCY, (CHEST PAIN, BREATHING DIFFICULTIES, PARALYSIS…) YOU SHOULD GO TO YOUR NEAREST EMERGENCY ROOM.    --  You should be contacted by our office within 2-3 days to schedule follow up or next appointment date.  If not contacted within 7 days, please call the office at [phone]

## 2024-10-22 NOTE — H&P
[facility]   HISTORY AND PHYSICAL    Patient Name: Dian Jefferson  : 1950  MRN: [id number]  Primary Care Physician:  Mallika Stewart MD  Date of admission: 10/23/2024    Subjective   Subjective     Chief Complaint: Sacroiliac joint pain    History of Present Illness  Chronic bilateral sacroiliac joint pain that improved after steroid injection in the past.      Review of Systems   Constitutional:  Negative for chills and fever.   Respiratory:  Negative for cough and shortness of breath.    Musculoskeletal:  Positive for back pain.        Personal History     Past Medical History:   Diagnosis Date    Anxiety     Colon polyp     GERD (gastroesophageal reflux disease)     HLD (hyperlipidemia)     Hot flash, menopausal     HTN (hypertension)     Hypothyroidism     Mammogram abnormal     Myalgia     Vitamin D deficiency        Past Surgical History:   Procedure Laterality Date    BREAST SURGERY      ENLARGEMENT    CATARACT EXTRACTION Bilateral     Aug 2023    CATARACT EXTRACTION, BILATERAL Bilateral 2021, 3/2021    COLONOSCOPY      COLONOSCOPY N/A 2022    Procedure: COLONOSCOPY;  Surgeon: Lorraien Alba MD;  Location: [facility] OR;  Service: Gastroenterology;  Laterality: N/A;  Diverticulosis    MEDIAL BRANCH BLOCK Bilateral 2022    Procedure: LUMBAR MEDIAL BRANCH BLOCK Bilateral ~L2-L5 x2 (2 weeks apart);  Surgeon: Mildred Mirza MD;  Location: [facility];  Service: Pain Management;  Laterality: Bilateral;    MEDIAL BRANCH BLOCK Bilateral 2022    Procedure: LUMBAR MEDIAL BRANCH BLOCK Bilateral ~L2-L5 x2 (2 weeks apart);  Surgeon: Mildred Mirza MD;  Location: [facility];  Service: Pain Management;  Laterality: Bilateral;    RADIOFREQUENCY ABLATION Bilateral 2022    Procedure: RADIOFREQUENCY ABLATION LUMBAR bilateral L2-L5;  Surgeon: Mildred Mirza MD;  Location: [facility];  Service: Pain Management;  Laterality: Bilateral;    RADIOFREQUENCY ABLATION Bilateral  09/11/2023    Procedure: RADIOFREQUENCY ABLATION LUMBAR. Bilateral L3-S1.  55921, [id number];  Surgeon: Mildred Mirza MD;  Location: [facility];  Service: Pain Management;  Laterality: Bilateral;    SACROILIAC JOINT INJECTION Left 12/07/2022    Procedure: SACROILIAC INJECTION;  Surgeon: Mildred Mirza MD;  Location: [facility];  Service: Pain Management;  Laterality: Left;    SACROILIAC JOINT INJECTION Left 12/4/2023    Procedure: SACROILIAC INJECTION. left. [id number];  Surgeon: Mildred Mirza MD;  Location: [facility];  Service: Pain Management;  Laterality: Left;    TONSILLECTOMY      TUBAL ABDOMINAL LIGATION         Family History: family history includes Breast cancer in her maternal aunt and mother; Colon polyps in her sister; Dementia in her mother; Depression in her mother; Heart attack in her father; Hypertension in her father, sister, and sister. Otherwise pertinent FHx was reviewed and not pertinent to current issue.    Social History:  reports that she has never smoked. She has never used smokeless tobacco. She reports current alcohol use. She reports that she does not use drugs.    Home Medications:  Vitamin D3, cetirizine, ezetimibe, levothyroxine, lidocaine, lisinopril, methocarbamol, rosuvastatin, and vilazodone    Allergies:  No Known Allergies    Objective    Objective     Vitals:   Temp:  [98 °F (36.7 °C)] 98 °F (36.7 °C)  Heart Rate:  [70] 70  Resp:  [18] 18  BP: (125)/(82) 125/82    Physical Exam  Constitutional:       General: She is not in acute distress.  Pulmonary:      Effort: Pulmonary effort is normal. No respiratory distress.   Skin:     General: Skin is warm and dry.   Neurological:      Mental Status: She is alert.   Psychiatric:         Mood and Affect: Mood normal.         Thought Content: Thought content normal.         Result Review    Result Review:  I have personally reviewed the results from the time of this admission to 10/23/2024 13:13 EDT and agree with these  findings:  []  Laboratory list / accordion  []  Microbiology  []  Radiology  []  EKG/Telemetry   []  Cardiology/Vascular   []  Pathology  []  Old records  []  Other:  Most notable findings include: No new      Assessment & Plan   Assessment / Plan     Brief Patient Summary:  Dain Jefferson is a 74 y.o. female who has chronic bilateral sacroiliac joint pain    Active Hospital Problems:  Active Hospital Problems    Diagnosis     **Sacroiliac joint dysfunction      Plan: Bilateral sacroiliac joint injection      VTE Prophylaxis:  No VTE prophylaxis order currently exists.    Mildred Mirza MD

## 2024-10-23 ENCOUNTER — HOSPITAL ENCOUNTER (OUTPATIENT)
Dept: GENERAL RADIOLOGY | Facility: SURGERY CENTER | Age: 74
Setting detail: HOSPITAL OUTPATIENT SURGERY
End: 2024-10-23
Payer: MEDICARE

## 2024-10-23 ENCOUNTER — HOSPITAL ENCOUNTER (OUTPATIENT)
Facility: SURGERY CENTER | Age: 74
Setting detail: HOSPITAL OUTPATIENT SURGERY
Discharge: HOME | End: 2024-10-23
Attending: ANESTHESIOLOGY | Admitting: ANESTHESIOLOGY
Payer: MEDICARE

## 2024-10-23 VITALS
TEMPERATURE: 98 F | BODY MASS INDEX: 29.99 KG/M2 | WEIGHT: 180 LBS | RESPIRATION RATE: 16 BRPM | DIASTOLIC BLOOD PRESSURE: 84 MMHG | SYSTOLIC BLOOD PRESSURE: 136 MMHG | OXYGEN SATURATION: 95 % | HEIGHT: 65 IN | HEART RATE: 67 BPM

## 2024-10-23 DIAGNOSIS — Z41.9 SURGERY, ELECTIVE: ICD-10-CM

## 2024-10-23 PROCEDURE — G0260 INJ FOR SACROILIAC JT ANESTH: HCPCS | Performed by: ANESTHESIOLOGY

## 2024-10-23 PROCEDURE — 25010000002 DEXAMETHASONE SODIUM PHOSPHATE 100 MG/10ML SOLUTION 10 ML VIAL: Performed by: ANESTHESIOLOGY

## 2024-10-23 PROCEDURE — 77002 NEEDLE LOCALIZATION BY XRAY: CPT

## 2024-10-23 PROCEDURE — 76000 FLUOROSCOPY <1 HR PHYS/QHP: CPT

## 2024-10-23 PROCEDURE — 0 IOHEXOL 300 MG/ML SOLUTION 10 ML VIAL: Performed by: ANESTHESIOLOGY

## 2024-10-23 PROCEDURE — 25010000002 LIDOCAINE PF 1% 1 % SOLUTION 30 ML VIAL: Performed by: ANESTHESIOLOGY

## 2024-10-23 NOTE — OP NOTE
Bilateral Sacroiliac Joint Injection  [facility]      PREOPERATIVE DIAGNOSIS:   Sacroiliac joint dysfunction    POSTOPERATIVE DIAGNOSIS:  Same    PROCEDURE:  Sacroiliac Joint Injection, with fluoroscopic guidance CPT 19043 -50    PRE-PROCEDURE DISCUSSION WITH PATIENT:    Risks and complications were discussed with the patient prior to starting the procedure and informed consent was obtained.  We discussed various topics including but not limited to bleeding, infection, injury, postprocedural site soreness, painful flareup, worsening of clinical picture, paralysis, coma, and death.     SURGEON:  Mildred Mirza MD    REASON FOR PROCEDURE:    Patient has pain consistent with SI pathology on history and physical exam.    SEDATION:  No sedation was used for this procedure  ANESTHETIC AGENT:  1% Lidocaine  STEROID AGENT:  10mg Dexamethasone    DESCRIPTON OF PROCEDURE:  After obtaining informed consent, IV access was not obtained in the preoperative area.  The patient was transported to the operative suite and placed in the prone position. Heart rate, blood pressure, and pulse oximeter were monitored. The lumbosacral area was prepped with Chloraprep and draped in a sterile fashion. Under fluoroscopic guidance the inferior most portion of the right SI joint was identified. The overlying skin and subcutaneous tissue was anesthetized with 1% lidocaine. A 22-gauge spinal needle was then advanced under fluoroscopic guidance in a coaxial view into the joint space.  After negative aspiration, 1 mL of Isovue 61% was injected, and after seeing appropriate spread into the joint a volume of 3ml of the above medication was injected.    Needle was removed intact.      The same procedure was then performed on the contralateral side in the exact same fashion.     Vital signs remained stable.  The onset of analgesia was noted.    Pre-procedure pain score: 0/10 at rest, 9/10 with activity  Post-procedure pain score:  0/10      ESTIMATED BLOOD LOSS:  minimal  SPECIMENS:  None  COMPLICATIONS:  No complications were noted.    TOLERANCE & DISCHARGE CONDITION:    The patient tolerated the procedure well.  The patient was transported to the recovery area without difficulties.  The patient was discharged to home under the care of family in stable and satisfactory condition.    PLAN OF CARE:  The patient was given our standard instruction sheet and will resume all medications as per the medication reconciliation sheet.  The patient will Return to clinic 1-2 wks.  The patient is instructed to keep an account of pain relief after the procedure.

## 2024-10-24 ENCOUNTER — TELEPHONE (OUTPATIENT)
Dept: PAIN MEDICINE | Facility: CLINIC | Age: 74
End: 2024-10-24
Payer: MEDICARE

## 2024-10-24 NOTE — TELEPHONE ENCOUNTER
Left message for patient to call the office to schedule a follow up with Terra Paul in 6-8 wks or sooner if she needed it.

## 2024-10-29 ENCOUNTER — TELEPHONE (OUTPATIENT)
Dept: PAIN MEDICINE | Facility: CLINIC | Age: 74
End: 2024-10-29
Payer: MEDICARE

## 2024-10-29 NOTE — TELEPHONE ENCOUNTER
Left message for patient to call the office to schedule a 6-8 wk follow up with Terra Paul after procedure from 10/23/24.

## 2024-11-05 NOTE — PROGRESS NOTES
Subjective   History of Present Illness: Dian Jefferson is a 74 y.o. female is being seen for consultation today at the request of LIZABETH Rico for a cervical radiculopathy. MRI Cervical was done on 9/13/24.  She reports that since July 1 she has noticed severe neck pain with pain that radiates into her left shoulder.  She reports that she has tried physical therapy and pain management without any significant relief.  She has had 1 cervical epidural injection.  She denies any changes in strength or sensation in her hands.  Denies a significant change in gait stability.    History of Present Illness    The following portions of the patient's history were reviewed and updated as appropriate: allergies, current medications, past family history, past medical history, past social history, past surgical history, and problem list.    Past Medical History:   Diagnosis Date    Anxiety     Colon polyp     GERD (gastroesophageal reflux disease)     HLD (hyperlipidemia)     Hot flash, menopausal     HTN (hypertension)     Hypothyroidism     Mammogram abnormal     Myalgia     Vitamin D deficiency         Past Surgical History:   Procedure Laterality Date    BREAST SURGERY  1980    ENLARGEMENT    CATARACT EXTRACTION Bilateral     Aug 2023    CATARACT EXTRACTION, BILATERAL Bilateral 2/2021, 3/2021    COLONOSCOPY      COLONOSCOPY N/A 03/07/2022    Procedure: COLONOSCOPY;  Surgeon: Lorraine Alba MD;  [place] [facility] [place];  Service: Gastroenterology;  Laterality: N/A;  Diverticulosis    MEDIAL BRANCH BLOCK Bilateral 02/23/2022    Procedure: LUMBAR MEDIAL BRANCH BLOCK Bilateral ~L2-L5 x2 (2 weeks apart);  Surgeon: Mildred Mirza MD;  [place] [facility] [place] [facility];  Service: Pain Management;  Laterality: Bilateral;    MEDIAL BRANCH BLOCK Bilateral 03/02/2022    Procedure: LUMBAR MEDIAL BRANCH BLOCK Bilateral ~L2-L5 x2 (2 weeks apart);  Surgeon: Mildred Mirza MD;  [place] [facility] [place];  Service: Pain Management;   Laterality: Bilateral;    RADIOFREQUENCY ABLATION Bilateral 03/30/2022    Procedure: RADIOFREQUENCY ABLATION LUMBAR bilateral L2-L5;  Surgeon: Mildred Mirza MD;  [facility];  Service: Pain Management;  Laterality: Bilateral;    RADIOFREQUENCY ABLATION Bilateral 09/11/2023    Procedure: RADIOFREQUENCY ABLATION LUMBAR. Bilateral L3-S1.  39437, [id number];  Surgeon: Mildred Mirza MD;  Location: [facility];  Service: Pain Management;  Laterality: Bilateral;    SACROILIAC JOINT INJECTION Left 12/07/2022    Procedure: SACROILIAC INJECTION;  Surgeon: Mildred Mirza MD;  [facility];  Service: Pain Management;  Laterality: Left;    SACROILIAC JOINT INJECTION Left 12/4/2023    Procedure: SACROILIAC INJECTION. left. [id number];  Surgeon: Mildred Mirza MD;  [facility];  Service: Pain Management;  Laterality: Left;    SACROILIAC JOINT INJECTION Bilateral 10/23/2024    Procedure: SACROILIAC INJECTION. bilateral.  92605;  Surgeon: Mildred Mirza MD;  Location: [facility];  Service: Pain Management;  Laterality: Bilateral;    TONSILLECTOMY      TUBAL ABDOMINAL LIGATION            Current Outpatient Medications:     cetirizine (ZyrTEC) 10 MG tablet, Take 1 tablet by mouth Daily., Disp: , Rfl:     Cholecalciferol (VITAMIN D3) 2000 UNITS capsule, Take 1 capsule by mouth Daily., Disp: , Rfl:     ezetimibe (ZETIA) 10 MG tablet, Take 1 tablet by mouth once daily, Disp: 90 tablet, Rfl: 1    lisinopril (PRINIVIL,ZESTRIL) 10 MG tablet, Take 1 tablet by mouth once daily, Disp: 90 tablet, Rfl: 1    methocarbamol (ROBAXIN) 750 MG tablet, Take 1 tablet by mouth 3 (Three) Times a Day As Needed for Muscle Spasms., Disp: 90 tablet, Rfl: 1    rosuvastatin (CRESTOR) 40 MG tablet, TAKE 1 TABLET BY MOUTH ONCE DAILY AT NIGHT, Disp: 90 tablet, Rfl: 1    Synthroid 25 MCG tablet, Take 1 tablet by mouth once daily, Disp: 90 tablet, Rfl: 1    vilazodone (VIIBRYD) 40 MG tablet tablet, Take 1 tablet by mouth  "once daily, Disp: 90 tablet, Rfl: 1    gabapentin (NEURONTIN) 100 MG capsule, Take 1 capsule by mouth 3 (Three) Times a Day. (Patient not taking: Reported on 11/8/2024), Disp: , Rfl:     gabapentin (NEURONTIN) 300 MG capsule, Take 1 capsule by mouth 3 (Three) Times a Day., Disp: 90 capsule, Rfl: 0    lidocaine (LIDODERM) 5 %, Place 1 patch on the skin as directed by provider Daily. Remove & Discard patch within 12 hours or as directed by MD (Patient not taking: Reported on 11/8/2024), Disp: , Rfl:      No Known Allergies     Social History     Socioeconomic History    Marital status:      Spouse name: Modesto Jefferson    Number of children: 2   Tobacco Use    Smoking status: Never    Smokeless tobacco: Never   Vaping Use    Vaping status: Never Used   Substance and Sexual Activity    Alcohol use: Yes     Comment: OCC. USE    Drug use: No    Sexual activity: Yes     Partners: Male        Family History   Problem Relation Age of Onset    Depression Mother     Breast cancer Mother         MASECTOMY    Dementia Mother     Heart attack Father     Hypertension Father     Breast cancer Maternal Aunt     Hypertension Sister     Colon polyps Sister     Hypertension Sister         Review of Systems   Constitutional:  Negative for chills and fever.   Eyes:  Negative for visual disturbance.   Gastrointestinal:  Negative for constipation, diarrhea, nausea and vomiting.   Genitourinary:  Negative for difficulty urinating and urgency.   Musculoskeletal:  Positive for back pain, neck pain (left upper arm pain) and neck stiffness. Negative for gait problem.   Neurological:  Positive for weakness (left arm) and numbness. Negative for dizziness and headaches.   All other systems reviewed and are negative.      Objective     Vitals:    11/08/24 1310   BP: 130/72   Pulse: 76   Temp: 96.8 °F (36 °C)   SpO2: 98%   Weight: 82.9 kg (182 lb 11.2 oz)   Height: 165.1 cm (65\")     Body mass index is 30.4 kg/m².      Physical Exam  Awake, " alert, oriented x3  Pupils equal round reactive to light  Extraocular muscles intact  Face symmetric  Speech is fluent and clear  Motor exam  Bilateral deltoids 5/5, bilateral biceps 5/5, bilateral triceps 5/5, bilateral wrist extension 5/5 bilateral hand  5/5  Bilateral hip flexion 5/5, bilateral knee extension 5/5, bilateral DF/PF 5/5  No clonus  No Sandra's reflex  Steady normal gait  Able to detect  light touch in all 4 extremities          Assessment & Plan   Independent Review of Radiographic Studies:      I personally reviewed the images from the following studies.  MRI cervical spine from 2024  The MRI images were reviewed and demonstrate diffuse degenerative changes throughout the cervical spine.  Most significantly at C4-5 and C5-6 where there is moderate canal stenosis and severe neuroforaminal stenosis.  At C5-6 there is a disc osteophyte complex eccentric to the left.    Medical Decision Makin-year-old female with severe neck pain and left shoulder pain since July.  -She has tried conservative management with physical therapy and at least 1 steroid injection with pain management without significant improvement in her symptoms.  She is currently on gabapentin 100 mg 3 times a day.  I will plan to increase the gabapentin dose to 300 mg 3 times a day.  I will plan to see her back with a CT and x-ray of the cervical spine.  I have also ordered an EMG to further evaluate the cause of her severe neck pain and shoulder pain.  If she has no significant improvement at the next visit will discuss the risks and benefits of both C4-5 and C5-6 ACDF.  Diagnoses and all orders for this visit:    1. DDD (degenerative disc disease), cervical (Primary)  -     CT Cervical Spine Without Contrast; Future  -     XR Spine Cervical Complete 4 or 5 View; Future  -     EMG & Nerve Conduction Test; Future  -     gabapentin (NEURONTIN) 300 MG capsule; Take 1 capsule by mouth 3 (Three) Times a Day.  Dispense: 90  capsule; Refill: 0    2. Spinal stenosis in cervical region  -     CT Cervical Spine Without Contrast; Future  -     XR Spine Cervical Complete 4 or 5 View; Future  -     EMG & Nerve Conduction Test; Future  -     gabapentin (NEURONTIN) 300 MG capsule; Take 1 capsule by mouth 3 (Three) Times a Day.  Dispense: 90 capsule; Refill: 0    3. Cervical radiculopathy  -     CT Cervical Spine Without Contrast; Future  -     XR Spine Cervical Complete 4 or 5 View; Future  -     EMG & Nerve Conduction Test; Future  -     gabapentin (NEURONTIN) 300 MG capsule; Take 1 capsule by mouth 3 (Three) Times a Day.  Dispense: 90 capsule; Refill: 0      Return in about 6 weeks (around 12/20/2024).    45 minutes reviewing the medical record, reviewing the MRI images, discussing the management of neuroforaminal stenosis, discussing the management of cervical spinal stenosis, discussing the risks and benefits of an ACDF, discussing the risks and benefits of the posterior cervical decompression and fusion

## 2024-11-08 ENCOUNTER — OFFICE VISIT (OUTPATIENT)
Dept: NEUROSURGERY | Facility: CLINIC | Age: 74
End: 2024-11-08
Payer: MEDICARE

## 2024-11-08 VITALS
WEIGHT: 182.7 LBS | SYSTOLIC BLOOD PRESSURE: 130 MMHG | DIASTOLIC BLOOD PRESSURE: 72 MMHG | HEIGHT: 65 IN | TEMPERATURE: 96.8 F | HEART RATE: 76 BPM | BODY MASS INDEX: 30.44 KG/M2 | OXYGEN SATURATION: 98 %

## 2024-11-08 DIAGNOSIS — M50.30 DDD (DEGENERATIVE DISC DISEASE), CERVICAL: Primary | ICD-10-CM

## 2024-11-08 DIAGNOSIS — M48.02 SPINAL STENOSIS IN CERVICAL REGION: ICD-10-CM

## 2024-11-08 DIAGNOSIS — M54.12 CERVICAL RADICULOPATHY: ICD-10-CM

## 2024-11-08 PROCEDURE — 1160F RVW MEDS BY RX/DR IN RCRD: CPT | Performed by: NEUROLOGICAL SURGERY

## 2024-11-08 PROCEDURE — 3075F SYST BP GE 130 - 139MM HG: CPT | Performed by: NEUROLOGICAL SURGERY

## 2024-11-08 PROCEDURE — 1159F MED LIST DOCD IN RCRD: CPT | Performed by: NEUROLOGICAL SURGERY

## 2024-11-08 PROCEDURE — 99204 OFFICE O/P NEW MOD 45 MIN: CPT | Performed by: NEUROLOGICAL SURGERY

## 2024-11-08 PROCEDURE — 3078F DIAST BP <80 MM HG: CPT | Performed by: NEUROLOGICAL SURGERY

## 2024-11-08 RX ORDER — GABAPENTIN 100 MG/1
100 CAPSULE ORAL 3 TIMES DAILY
COMMUNITY
Start: 2024-10-17

## 2024-11-08 RX ORDER — GABAPENTIN 300 MG/1
300 CAPSULE ORAL 3 TIMES DAILY
Qty: 90 CAPSULE | Refills: 0 | Status: SHIPPED | OUTPATIENT
Start: 2024-11-08

## 2024-11-21 ENCOUNTER — HOSPITAL ENCOUNTER (OUTPATIENT)
Dept: NEUROLOGY | Facility: HOSPITAL | Age: 74
Discharge: HOME | End: 2024-11-21
Admitting: PSYCHIATRY & NEUROLOGY
Payer: MEDICARE

## 2024-11-21 DIAGNOSIS — M48.02 SPINAL STENOSIS IN CERVICAL REGION: ICD-10-CM

## 2024-11-21 DIAGNOSIS — M54.12 CERVICAL RADICULOPATHY: ICD-10-CM

## 2024-11-21 DIAGNOSIS — M50.30 DDD (DEGENERATIVE DISC DISEASE), CERVICAL: ICD-10-CM

## 2024-11-21 PROCEDURE — 95886 MUSC TEST DONE W/N TEST COMP: CPT | Performed by: PSYCHIATRY & NEUROLOGY

## 2024-11-21 PROCEDURE — 95909 NRV CNDJ TST 5-6 STUDIES: CPT | Performed by: PSYCHIATRY & NEUROLOGY

## 2024-11-21 NOTE — PROCEDURES
EMG and Nerve Conduction Studies    I.      Instrument used: Neuromax 1002  II.     Please see data sheets for tabular summary of NCS and details on methods, temperatures and lab standards.   III.    EMG muscles tested for upper extremity studies include the deltoid, biceps, triceps, pronator teres, extensor digitorum communis, first dorsal interosseous and abductor pollicis brevis.    IV.   EMG muscles tested for lower extremity studies include the vastus lateralis, tibialis anterior, peroneus longus, medial gastrocnemius and extensor digitorum brevis.    V.    Additional muscles tested as needed.  Paraspinal muscles tested as needed.   VI.   Please see data sheets for tabular summary of EMG findings.   VII. The complete report includes the data sheets.      Indication: Left cervical radiculopathy  History: 74-year-old woman with neck and left shoulder pain which is rather severe and persistent.  MRI of the cervical spine shows multilevel degenerative disc disease with foraminal stenoses on both sides at multiple levels.  She denies a history of diabetes.  Recent hemoglobin A1c at 5.8% is consistent with prediabetes.  She has treated hypothyroidism.      Ht: 65 inches  Wt: 180 pounds  HbA1C:   Lab Results   Component Value Date    HGBA1C 5.80 (H) 08/30/2024     TSH:   Lab Results   Component Value Date    TSH 2.870 08/30/2024       Technical summary:  Nerve conduction studies were obtained in the left arm.  Skin temperatures were at least 32 °C measured on the palm.  Needle examination was obtained on selected muscles in both arms.    Results:  1.  Normal left median antidromic sensory distal latency and amplitude.  2.  Normal left ulnar antidromic sensory distal latency and amplitude.  3.  Normal left radial sensory distal latency and amplitude.  4.  Normal left median motor conduction velocity with a normal distal latency and amplitudes.  5.  Normal ulnar motor conduction velocities with a normal distal latency  and amplitudes.  6.  Needle examination of selected muscles in both arms showed complex repetitive potentials in the left pronator teres with normal motor units and recruitment.  There were occasional positive sharp waves in the right biceps with normal motor units and recruitment patterns.  All other muscles tested in both arms showed normal insertional activities, motor units and recruitment patterns.  Cervical paraspinals at C6 showed fibrillations and positive sharp waves on the left and positive sharp waves on the right    Impression:  Abnormal study consistent with bilateral cervical radiculopathies which were only manifested in 1 muscle in each arm.  On the left C6 or C7 on the right C5 or C6.  Paraspinal abnormalities demonstrated root level involvement.  There was no evidence of polyneuropathy or a more focal neuropathy in the left arm by nerve conduction study.  Study results were discussed with the patient.    Edilberto Good M.D.              Dictated utilizing Dragon dictation.

## 2024-11-25 ENCOUNTER — HOSPITAL ENCOUNTER (OUTPATIENT)
Dept: GENERAL RADIOLOGY | Facility: HOSPITAL | Age: 74
Discharge: HOME | End: 2024-11-25
Payer: MEDICARE

## 2024-11-25 ENCOUNTER — HOSPITAL ENCOUNTER (OUTPATIENT)
Dept: CT IMAGING | Facility: HOSPITAL | Age: 74
Discharge: HOME | End: 2024-11-25
Payer: MEDICARE

## 2024-11-25 DIAGNOSIS — M50.30 DDD (DEGENERATIVE DISC DISEASE), CERVICAL: ICD-10-CM

## 2024-11-25 DIAGNOSIS — M48.02 SPINAL STENOSIS IN CERVICAL REGION: ICD-10-CM

## 2024-11-25 DIAGNOSIS — M54.12 CERVICAL RADICULOPATHY: ICD-10-CM

## 2024-11-25 PROCEDURE — 72125 CT NECK SPINE W/O DYE: CPT

## 2024-11-25 PROCEDURE — 72052 X-RAY EXAM NECK SPINE 6/>VWS: CPT

## 2024-12-16 NOTE — PROGRESS NOTES
Subjective   History of Present Illness: Dian Jefferson is a 74 y.o. female is here today for follow-up on neck and left shoulder pain. EMG 11/21/24, cervical XR and CT cervical spine was completed on 11/25/24.  She is doing well today.  She reports that she has had some improvement in her neck and left shoulder pain since her last follow-up.  Denies any changes in strength or sensation.    History of Present Illness    The following portions of the patient's history were reviewed and updated as appropriate: allergies, current medications, past family history, past medical history, past social history, past surgical history, and problem list.    Past Medical History:   Diagnosis Date    Anxiety     Colon polyp     GERD (gastroesophageal reflux disease)     HLD (hyperlipidemia)     Hot flash, menopausal     HTN (hypertension)     Hypothyroidism     Mammogram abnormal     Myalgia     Vitamin D deficiency         Past Surgical History:   Procedure Laterality Date    BREAST SURGERY  1980    ENLARGEMENT    CATARACT EXTRACTION Bilateral     Aug 2023    CATARACT EXTRACTION, BILATERAL Bilateral 2/2021, 3/2021    COLONOSCOPY      COLONOSCOPY N/A 03/07/2022    Procedure: COLONOSCOPY;  Surgeon: Lorraine Alba MD;  [place] [facility] [place] [facility];  Service: Gastroenterology;  Laterality: N/A;  Diverticulosis    MEDIAL BRANCH BLOCK Bilateral 02/23/2022    Procedure: LUMBAR MEDIAL BRANCH BLOCK Bilateral ~L2-L5 x2 (2 weeks apart);  Surgeon: Mildred Mirza MD;  [place]: [facility] [place] [facility];  Service: Pain Management;  Laterality: Bilateral;    MEDIAL BRANCH BLOCK Bilateral 03/02/2022    Procedure: LUMBAR MEDIAL BRANCH BLOCK Bilateral ~L2-L5 x2 (2 weeks apart);  Surgeon: Mildred Mirza MD;  [place] [facility] [place] [facility];  Service: Pain Management;  Laterality: Bilateral;    RADIOFREQUENCY ABLATION Bilateral 03/30/2022    Procedure: RADIOFREQUENCY ABLATION LUMBAR bilateral L2-L5;  Surgeon: Mildred Mirza MD;  [place]: [facility] [place] [facility];   Service: Pain Management;  Laterality: Bilateral;    RADIOFREQUENCY ABLATION Bilateral 09/11/2023    Procedure: RADIOFREQUENCY ABLATION LUMBAR. Bilateral L3-S1.  [id number], [id number];  Surgeon: Mildred Mirza MD;  Location[facility];  Service: Pain Management;  Laterality: Bilateral;    SACROILIAC JOINT INJECTION Left 12/07/2022    Procedure: SACROILIAC INJECTION;  Surgeon: Mildred Mirza MD;  [facility]: [facility];  Service: Pain Management;  Laterality: Left;    SACROILIAC JOINT INJECTION Left 12/4/2023    Procedure: SACROILIAC INJECTION. left. [id number];  Surgeon: Mildred Mirza MD;  [facility];  Service: Pain Management;  Laterality: Left;    SACROILIAC JOINT INJECTION Bilateral 10/23/2024    Procedure: SACROILIAC INJECTION. bilateral.  [id number];  Surgeon: Mildred Mirza MD;  [facility]: [facility];  Service: Pain Management;  Laterality: Bilateral;    TONSILLECTOMY      TUBAL ABDOMINAL LIGATION            Current Outpatient Medications:     cetirizine (ZyrTEC) 10 MG tablet, Take 1 tablet by mouth Daily., Disp: , Rfl:     Cholecalciferol (VITAMIN D3) 2000 UNITS capsule, Take 1 capsule by mouth Daily., Disp: , Rfl:     ezetimibe (ZETIA) 10 MG tablet, Take 1 tablet by mouth once daily, Disp: 90 tablet, Rfl: 1    gabapentin (NEURONTIN) 100 MG capsule, Take 1 capsule by mouth 3 (Three) Times a Day., Disp: , Rfl:     methocarbamol (ROBAXIN) 750 MG tablet, Take 1 tablet by mouth 3 (Three) Times a Day As Needed for Muscle Spasms., Disp: 90 tablet, Rfl: 1    rosuvastatin (CRESTOR) 40 MG tablet, TAKE 1 TABLET BY MOUTH ONCE DAILY AT NIGHT, Disp: 90 tablet, Rfl: 1    Synthroid 25 MCG tablet, Take 1 tablet by mouth once daily, Disp: 90 tablet, Rfl: 1    vilazodone (VIIBRYD) 40 MG tablet tablet, Take 1 tablet by mouth once daily, Disp: 90 tablet, Rfl: 1    gabapentin (NEURONTIN) 300 MG capsule, Take 1 capsule by mouth 3 (Three) Times a Day. (Patient not taking: Reported on 12/20/2024), Disp: 90 capsule, Rfl:  "0    lidocaine (LIDODERM) 5 %, Place 1 patch on the skin as directed by provider Daily. Remove & Discard patch within 12 hours or as directed by MD (Patient not taking: Reported on 12/20/2024), Disp: , Rfl:     lisinopril (PRINIVIL,ZESTRIL) 10 MG tablet, Take 1 tablet by mouth once daily (Patient not taking: Reported on 12/20/2024), Disp: 90 tablet, Rfl: 1     No Known Allergies     Social History     Socioeconomic History    Marital status:      Spouse name: Modesto Jefferson    Number of children: 2   Tobacco Use    Smoking status: Never    Smokeless tobacco: Never   Vaping Use    Vaping status: Never Used   Substance and Sexual Activity    Alcohol use: Yes     Comment: OCC. USE    Drug use: No    Sexual activity: Yes     Partners: Male        Family History   Problem Relation Age of Onset    Depression Mother     Breast cancer Mother         MASECTOMY    Dementia Mother     Heart attack Father     Hypertension Father     Breast cancer Maternal Aunt     Hypertension Sister     Colon polyps Sister     Hypertension Sister         Review of Systems   Musculoskeletal:  Positive for back pain, gait problem and neck pain (left shoulder pain).   Neurological:  Negative for weakness.   All other systems reviewed and are negative.      Objective     Vitals:    12/20/24 1412   BP: 118/74   Pulse: 75   SpO2: 98%   Weight: 82.3 kg (181 lb 8 oz)   Height: 165.1 cm (65\")     Body mass index is 30.2 kg/m².      Physical Exam  Awake, alert, oriented x3  Face symmetric  Speech is fluent and clear  Motor exam  Bilateral deltoids 5/5, bilateral biceps 5/5, bilateral triceps 5/5, bilateral wrist extension 5/5 bilateral hand  5/5  Bilateral hip flexion 5/5, bilateral knee extension 5/5, bilateral DF/PF 5/5  No clonus  No Sandra's reflex  Steady normal gait  Able to detect  light touch in all 4 extremities        Assessment & Plan   Independent Review of Radiographic Studies:      I personally reviewed the images from the " following studies.  X-ray of the cervical spine from 2024, CT cervical spine from 2024, MRI of the cervical spine from 2024  MRI, CT, x-ray images were reviewed and demonstrate generative changes throughout the cervical spine.  Most significantly at C5-6 where there is moderate central canal stenosis and severe left-sided neuroforaminal stenosis.  This is mostly from a disc osteophyte complex eccentric to the left    Medical Decision Makin-year-old female with a 1 year history of worsening neck pain and left shoulder pain  -After reviewing her MRI images, CT images, x-ray images, EMG study I believe most of her shoulder pain is coming from the left sided C5-6 neuroforaminal stenosis.  We discussed the risks and benefits of an ACDF.  She would like to hold off from the surgery for now since she has had some improvement in her symptoms.  She plans to follow-up with a new pain management doctor named Sushma Holguin MD.  I have asked her to call or come back if she has any worsening or recurrence of her symptoms or would like to proceed with surgery  Diagnoses and all orders for this visit:    1. DDD (degenerative disc disease), cervical (Primary)    2. Spinal stenosis in cervical region    3. Cervical radiculopathy    4. Herniated nucleus pulposus, C5-6 left      Return if symptoms worsen or fail to improve.  I spent 30 minutes reviewing the medical record, reviewing the MRI images, reviewing the CT images, reviewing x-ray images, reviewing the EMG report, discussing the risks and benefits of an ACDF, discussing the causes of neck and shoulder pain

## 2024-12-20 ENCOUNTER — OFFICE VISIT (OUTPATIENT)
Dept: NEUROSURGERY | Facility: CLINIC | Age: 74
End: 2024-12-20
Payer: MEDICARE

## 2024-12-20 VITALS
OXYGEN SATURATION: 98 % | HEIGHT: 65 IN | HEART RATE: 75 BPM | WEIGHT: 181.5 LBS | DIASTOLIC BLOOD PRESSURE: 74 MMHG | BODY MASS INDEX: 30.24 KG/M2 | SYSTOLIC BLOOD PRESSURE: 118 MMHG

## 2024-12-20 DIAGNOSIS — M50.30 DDD (DEGENERATIVE DISC DISEASE), CERVICAL: Primary | ICD-10-CM

## 2024-12-20 DIAGNOSIS — M48.02 SPINAL STENOSIS IN CERVICAL REGION: ICD-10-CM

## 2024-12-20 DIAGNOSIS — M50.222 HERNIATED NUCLEUS PULPOSUS, C5-6 LEFT: ICD-10-CM

## 2024-12-20 DIAGNOSIS — M54.12 CERVICAL RADICULOPATHY: ICD-10-CM

## 2024-12-20 PROCEDURE — 3074F SYST BP LT 130 MM HG: CPT | Performed by: NEUROLOGICAL SURGERY

## 2024-12-20 PROCEDURE — 1160F RVW MEDS BY RX/DR IN RCRD: CPT | Performed by: NEUROLOGICAL SURGERY

## 2024-12-20 PROCEDURE — 99214 OFFICE O/P EST MOD 30 MIN: CPT | Performed by: NEUROLOGICAL SURGERY

## 2024-12-20 PROCEDURE — 3078F DIAST BP <80 MM HG: CPT | Performed by: NEUROLOGICAL SURGERY

## 2024-12-20 PROCEDURE — 1159F MED LIST DOCD IN RCRD: CPT | Performed by: NEUROLOGICAL SURGERY

## 2025-01-14 ENCOUNTER — APPOINTMENT (OUTPATIENT)
Dept: GENERAL RADIOLOGY | Facility: HOSPITAL | Age: 75
End: 2025-01-14
Payer: MEDICARE

## 2025-01-14 ENCOUNTER — HOSPITAL ENCOUNTER (OUTPATIENT)
Facility: HOSPITAL | Age: 75
Discharge: HOME | End: 2025-01-14
Attending: STUDENT IN AN ORGANIZED HEALTH CARE EDUCATION/TRAINING PROGRAM
Payer: MEDICARE

## 2025-01-14 VITALS
OXYGEN SATURATION: 93 % | RESPIRATION RATE: 16 BRPM | HEIGHT: 65 IN | WEIGHT: 175 LBS | BODY MASS INDEX: 29.16 KG/M2 | SYSTOLIC BLOOD PRESSURE: 111 MMHG | HEART RATE: 84 BPM | DIASTOLIC BLOOD PRESSURE: 76 MMHG | TEMPERATURE: 97.1 F

## 2025-01-14 DIAGNOSIS — J40 BRONCHITIS: Primary | ICD-10-CM

## 2025-01-14 LAB
FLUAV SUBTYP SPEC NAA+PROBE: NOT DETECTED
FLUBV RNA ISLT QL NAA+PROBE: NOT DETECTED
SARS-COV-2 RNA RESP QL NAA+PROBE: NOT DETECTED

## 2025-01-14 PROCEDURE — 25010000002 DEXAMETHASONE PER 1 MG: Performed by: STUDENT IN AN ORGANIZED HEALTH CARE EDUCATION/TRAINING PROGRAM

## 2025-01-14 PROCEDURE — 71046 X-RAY EXAM CHEST 2 VIEWS: CPT

## 2025-01-14 PROCEDURE — 87636 SARSCOV2 & INF A&B AMP PRB: CPT | Performed by: STUDENT IN AN ORGANIZED HEALTH CARE EDUCATION/TRAINING PROGRAM

## 2025-01-14 RX ADMIN — DEXAMETHASONE SODIUM PHOSPHATE 10 MG: 10 INJECTION, SOLUTION INTRAMUSCULAR; INTRAVENOUS at 17:23

## 2025-01-14 NOTE — Clinical Note
[facility]  [address]  [place] [id number]    Dian Jefferson was seen and treated in our emergency department on 1/14/2025.  She may return to work on 01/16/2025.         Thank you for choosing [facility].    Wade Smith MD

## 2025-01-14 NOTE — FS[facility] PROVIDER NOTE
"Subjective   History of Present Illness  74-year-old female past medical history of hypertension hyperlipidemia as well as hypothyroidism presenting with complaint of cough runny nose as well as nasal congestion and sore throat this been present since 3 days ago.  States that the symptoms have continued, she feels \"lousy \"she has fatigue.        Review of Systems    Past Medical History:   Diagnosis Date    Anxiety     Colon polyp     GERD (gastroesophageal reflux disease)     HLD (hyperlipidemia)     Hot flash, menopausal     HTN (hypertension)     Hypothyroidism     Mammogram abnormal     Myalgia     Vitamin D deficiency        No Known Allergies    Past Surgical History:   Procedure Laterality Date    BREAST SURGERY  1980    ENLARGEMENT    CATARACT EXTRACTION Bilateral     Aug 2023    CATARACT EXTRACTION, BILATERAL Bilateral 2/2021, 3/2021    COLONOSCOPY      COLONOSCOPY N/A 03/07/2022    Procedure: COLONOSCOPY;  Surgeon: Lorraine Alba MD;  Location: [facility];  Service: Gastroenterology;  Laterality: N/A;  Diverticulosis    MEDIAL BRANCH BLOCK Bilateral 02/23/2022    Procedure: LUMBAR MEDIAL BRANCH BLOCK Bilateral ~L2-L5 x2 (2 weeks apart);  Surgeon: Mildred Mirza MD;  Location: [facility];  Service: Pain Management;  Laterality: Bilateral;    MEDIAL BRANCH BLOCK Bilateral 03/02/2022    Procedure: LUMBAR MEDIAL BRANCH BLOCK Bilateral ~L2-L5 x2 (2 weeks apart);  Surgeon: Mildred Mirza MD;  Location: [facility];  Service: Pain Management;  Laterality: Bilateral;    RADIOFREQUENCY ABLATION Bilateral 03/30/2022    Procedure: RADIOFREQUENCY ABLATION LUMBAR bilateral L2-L5;  Surgeon: Mildred Mirza MD;  Location: [facility];  Service: Pain Management;  Laterality: Bilateral;    RADIOFREQUENCY ABLATION Bilateral 09/11/2023    Procedure: RADIOFREQUENCY ABLATION LUMBAR. Bilateral L3-S1.  55352, [id number];  Surgeon: Mildred Mirza MD;  Location: [facility];  Service: Pain Management;  " Laterality: Bilateral;    SACROILIAC JOINT INJECTION Left 12/07/2022    Procedure: SACROILIAC INJECTION;  Surgeon: Mildred Mirza MD;  Location: [facility];  Service: Pain Management;  Laterality: Left;    SACROILIAC JOINT INJECTION Left 12/4/2023    Procedure: SACROILIAC INJECTION. left. 77788;  Surgeon: Mildred Mirza MD;  [facility];  Service: Pain Management;  Laterality: Left;    SACROILIAC JOINT INJECTION Bilateral 10/23/2024    Procedure: SACROILIAC INJECTION. bilateral.  09854;  Surgeon: Mildred Mirza MD;  Location: [facility];  Service: Pain Management;  Laterality: Bilateral;    TONSILLECTOMY      TUBAL ABDOMINAL LIGATION         Family History   Problem Relation Age of Onset    Depression Mother     Breast cancer Mother         MASECTOMY    Dementia Mother     Heart attack Father     Hypertension Father     Breast cancer Maternal Aunt     Hypertension Sister     Colon polyps Sister     Hypertension Sister        Social History     Socioeconomic History    Marital status:      Spouse name: Modesto Jefferson    Number of children: 2   Tobacco Use    Smoking status: Never    Smokeless tobacco: Never   Vaping Use    Vaping status: Never Used   Substance and Sexual Activity    Alcohol use: Yes     Comment: OCC. USE    Drug use: No    Sexual activity: Yes     Partners: Male           Objective   Physical Exam  Vitals and nursing note reviewed. Exam conducted with a chaperone present.   Constitutional:       General: She is not in acute distress.     Appearance: Normal appearance. She is not ill-appearing, toxic-appearing or diaphoretic.   HENT:      Head: Normocephalic and atraumatic.      Right Ear: Tympanic membrane, ear canal and external ear normal.      Left Ear: Tympanic membrane, ear canal and external ear normal.      Nose: Nose normal. No congestion or rhinorrhea.      Mouth/Throat:      Pharynx: Oropharynx is clear. Posterior oropharyngeal erythema (Papules in the posterior  oropharynx near the hard palate, no pustules, tonsils not apparent uvula midline no swelling,) present. No oropharyngeal exudate.   Eyes:      Extraocular Movements: Extraocular movements intact.      Conjunctiva/sclera: Conjunctivae normal.      Pupils: Pupils are equal, round, and reactive to light.   Cardiovascular:      Rate and Rhythm: Normal rate and regular rhythm.   Pulmonary:      Effort: Pulmonary effort is normal. No respiratory distress.      Breath sounds: Normal breath sounds. No stridor. No wheezing, rhonchi or rales.   Abdominal:      General: Abdomen is flat. There is no distension.      Tenderness: There is no abdominal tenderness. There is no guarding or rebound.   Musculoskeletal:         General: No swelling, tenderness, deformity or signs of injury. Normal range of motion.      Cervical back: Normal range of motion.   Skin:     General: Skin is warm and dry.      Capillary Refill: Capillary refill takes less than 2 seconds.      Findings: No erythema or rash.   Neurological:      General: No focal deficit present.      Mental Status: She is alert and oriented to person, place, and time. Mental status is at baseline.      Cranial Nerves: No cranial nerve deficit.      Sensory: No sensory deficit.      Motor: No weakness.   Psychiatric:         Mood and Affect: Mood normal.         Procedures           ED Course                                           Medical Decision Making  Patient here for evaluation of flulike symptoms found to primarily have symptoms of a cough.  She took home COVID and flu test, stated that she could not toughed out so she came into the emergency room for further evaluation and on exam there was no source of focal bacterial infection.  She had the papules on her posterior oropharynx but no signs of streptococcal pharyngitis, she had no signs of uvulitis,  retropharyngeal abscess, Jeremiah angina, her symptom of primarily cough without respiratory distress suggest that she  has a bronchitis    Problems Addressed:  Bronchitis: complicated acute illness or injury    Amount and/or Complexity of Data Reviewed  Radiology: ordered.    Risk  OTC drugs.  Prescription drug management.        Final diagnoses:   Bronchitis       ED Disposition  ED Disposition       ED Disposition   Discharge    Condition   Stable    Comment   --               Mallika Stewart MD  [address]  [address]  [facility] [id number] [phone]    In 3 days      [facility]  [address] [facility] [id number]    If symptoms worsen         Medication List        New Prescriptions      benzocaine-menthol 6-10 MG lozenge  Commonly known as: CHLORASEPTIC  Take 1 each by mouth Every 2 (Two) Hours As Needed for Sore Throat.               Where to Get Your Medications        These medications were sent to [facility] - [place], [place] - [address] - [phone]  - [phone]  [address] [facility] [address]      Phone: [phone]   benzocaine-menthol 6-10 MG lozenge

## 2025-01-17 ENCOUNTER — OFFICE VISIT (OUTPATIENT)
Dept: INTERNAL MEDICINE | Facility: CLINIC | Age: 75
End: 2025-01-17
Payer: MEDICARE

## 2025-01-17 VITALS
HEART RATE: 77 BPM | HEIGHT: 65 IN | BODY MASS INDEX: 29.16 KG/M2 | OXYGEN SATURATION: 94 % | TEMPERATURE: 98.1 F | SYSTOLIC BLOOD PRESSURE: 98 MMHG | DIASTOLIC BLOOD PRESSURE: 70 MMHG | WEIGHT: 175 LBS

## 2025-01-17 DIAGNOSIS — J06.9 ACUTE URI: Primary | ICD-10-CM

## 2025-01-17 PROCEDURE — 1125F AMNT PAIN NOTED PAIN PRSNT: CPT | Performed by: INTERNAL MEDICINE

## 2025-01-17 PROCEDURE — 3078F DIAST BP <80 MM HG: CPT | Performed by: INTERNAL MEDICINE

## 2025-01-17 PROCEDURE — 3074F SYST BP LT 130 MM HG: CPT | Performed by: INTERNAL MEDICINE

## 2025-01-17 PROCEDURE — 99214 OFFICE O/P EST MOD 30 MIN: CPT | Performed by: INTERNAL MEDICINE

## 2025-01-17 RX ORDER — AZITHROMYCIN 250 MG/1
TABLET, FILM COATED ORAL
Qty: 6 TABLET | Refills: 0 | Status: SHIPPED | OUTPATIENT
Start: 2025-01-17

## 2025-01-17 NOTE — PROGRESS NOTES
Subjective   Dian Jefferson is a 74 y.o. female.   She has had a cough since last weekend  History of Present Illness   She has been coughing with sinus drainage  coughing up yellow mucous.  She was given a steroid and she is no better cough is now more productive    The following portions of the patient's history were reviewed and updated as appropriate: allergies, current medications, past family history, past medical history, past social history, past surgical history, and problem list.    Review of Systems    Objective   Physical Exam  Vitals reviewed.   Constitutional:       Appearance: She is well-developed.   HENT:      Head: Normocephalic and atraumatic.      Right Ear: External ear normal.      Left Ear: External ear normal.      Mouth/Throat:      Comments: Lymphoid hyperplasia in the posterior pharynx  Eyes:      Conjunctiva/sclera: Conjunctivae normal.      Pupils: Pupils are equal, round, and reactive to light.   Neck:      Thyroid: No thyromegaly.      Trachea: No tracheal deviation.   Cardiovascular:      Rate and Rhythm: Normal rate and regular rhythm.      Heart sounds: Normal heart sounds.   Pulmonary:      Effort: Pulmonary effort is normal.      Breath sounds: Normal breath sounds.   Abdominal:      General: Bowel sounds are normal. There is no distension.      Palpations: Abdomen is soft.      Tenderness: There is no abdominal tenderness.   Musculoskeletal:         General: No deformity. Normal range of motion.      Cervical back: Normal range of motion.   Skin:     General: Skin is warm and dry.   Neurological:      Mental Status: She is alert and oriented to person, place, and time.   Psychiatric:         Behavior: Behavior normal.         Thought Content: Thought content normal.         Judgment: Judgment normal.         Vitals:    01/17/25 1207   BP: 98/70   Pulse: 77   Temp: 98.1 °F (36.7 °C)   SpO2: 94%     Body mass index is 29.12 kg/m².         Assessment & Plan   Diagnoses and all orders  for this visit:    1. Acute URI (Primary)    Other orders  -     azithromycin (Zithromax Z-Neeraj) 250 MG tablet; Take 2 tablets the first day, then 1 tablet daily for 4 days.  Dispense: 6 tablet; Refill: 0    1.  Bronchitis: We will go ahead and take a Z-Neeraj for her as she has been having symptoms now for a week.  I did advise her to keep drinking plenty of fluids.  She can use Delsym for the cough.  If symptoms worsen or fail to improve she will let me know or go to the ER

## 2025-01-27 DIAGNOSIS — E78.5 HYPERLIPIDEMIA, UNSPECIFIED HYPERLIPIDEMIA TYPE: ICD-10-CM

## 2025-01-27 RX ORDER — ROSUVASTATIN CALCIUM 40 MG/1
TABLET, COATED ORAL
Qty: 90 TABLET | Refills: 1 | Status: SHIPPED | OUTPATIENT
Start: 2025-01-27

## 2025-01-27 RX ORDER — VILAZODONE HYDROCHLORIDE 40 MG/1
40 TABLET ORAL DAILY
Qty: 90 TABLET | Refills: 1 | Status: SHIPPED | OUTPATIENT
Start: 2025-01-27

## 2025-03-12 RX ORDER — LISINOPRIL 10 MG/1
10 TABLET ORAL DAILY
Qty: 90 TABLET | Refills: 1 | Status: SHIPPED | OUTPATIENT
Start: 2025-03-12

## 2025-03-12 RX ORDER — LEVOTHYROXINE SODIUM 25 MCG
25 TABLET ORAL DAILY
Qty: 90 TABLET | Refills: 1 | Status: SHIPPED | OUTPATIENT
Start: 2025-03-12

## 2025-03-13 ENCOUNTER — OFFICE VISIT (OUTPATIENT)
Dept: INTERNAL MEDICINE | Facility: CLINIC | Age: 75
End: 2025-03-13
Payer: MEDICARE

## 2025-03-13 VITALS
OXYGEN SATURATION: 96 % | HEART RATE: 75 BPM | WEIGHT: 174.4 LBS | DIASTOLIC BLOOD PRESSURE: 78 MMHG | HEIGHT: 65 IN | TEMPERATURE: 98 F | BODY MASS INDEX: 29.06 KG/M2 | SYSTOLIC BLOOD PRESSURE: 118 MMHG

## 2025-03-13 DIAGNOSIS — Z78.0 POSTMENOPAUSAL: ICD-10-CM

## 2025-03-13 DIAGNOSIS — M54.2 CHRONIC NECK PAIN: ICD-10-CM

## 2025-03-13 DIAGNOSIS — G89.29 CHRONIC NECK PAIN: ICD-10-CM

## 2025-03-13 DIAGNOSIS — E78.5 HYPERLIPIDEMIA, UNSPECIFIED HYPERLIPIDEMIA TYPE: ICD-10-CM

## 2025-03-13 DIAGNOSIS — I10 ESSENTIAL HYPERTENSION: ICD-10-CM

## 2025-03-13 DIAGNOSIS — Z00.00 MEDICARE ANNUAL WELLNESS VISIT, SUBSEQUENT: Primary | ICD-10-CM

## 2025-03-13 DIAGNOSIS — E03.9 ACQUIRED HYPOTHYROIDISM: ICD-10-CM

## 2025-03-13 DIAGNOSIS — Z12.31 ENCOUNTER FOR SCREENING MAMMOGRAM FOR MALIGNANT NEOPLASM OF BREAST: ICD-10-CM

## 2025-03-13 NOTE — PROGRESS NOTES
Subjective   The ABCs of the Annual Wellness Visit  Medicare Wellness Visit      Dian Jefferson is a 75 y.o. patient who presents for a Medicare Wellness Visit.    The following portions of the patient's history were reviewed and   updated as appropriate: allergies, current medications, past family history, past medical history, past social history, past surgical history, and problem list.    Compared to one year ago, the patient's physical   health is worse.back and neck are worse  seeing pain   Compared to one year ago, the patient's mental   health is the same.    Recent Hospitalizations:  This patient has had a [facility] admission record on file within the last 365 days.  Current Medical Providers:  Patient Care Team:  Mallika Stewart MD as PCP - General (Internal Medicine)  Reyes, Rosenberg Acosta, MD as Consulting Physician (Family Medicine)  Britney Cole MD as Consulting Physician (Obstetrics and Gynecology)  Terra Paul APRN as Nurse Practitioner (Pain Medicine)    Outpatient Medications Prior to Visit   Medication Sig Dispense Refill    cetirizine (ZyrTEC) 10 MG tablet Take 1 tablet by mouth Daily.      Cholecalciferol (VITAMIN D3) 2000 UNITS capsule Take 1 capsule by mouth Daily.      ezetimibe (ZETIA) 10 MG tablet Take 1 tablet by mouth once daily 90 tablet 1    gabapentin (NEURONTIN) 100 MG capsule Take 1 capsule by mouth 3 (Three) Times a Day.      lisinopril (PRINIVIL,ZESTRIL) 10 MG tablet Take 1 tablet by mouth once daily 90 tablet 1    rosuvastatin (CRESTOR) 40 MG tablet TAKE 1 TABLET BY MOUTH ONCE DAILY AT NIGHT 90 tablet 1    Synthroid 25 MCG tablet Take 1 tablet by mouth once daily 90 tablet 1    vilazodone (VIIBRYD) 40 MG tablet tablet Take 1 tablet by mouth Daily. 90 tablet 1    gabapentin (NEURONTIN) 300 MG capsule Take 1 capsule by mouth 3 (Three) Times a Day. (Patient not taking: Reported on 12/20/2024) 90 capsule 0    lidocaine (LIDODERM) 5 % Place 1 patch on the skin as  directed by provider Daily. Remove & Discard patch within 12 hours or as directed by MD (Patient not taking: Reported on 3/13/2025)      methocarbamol (ROBAXIN) 750 MG tablet Take 1 tablet by mouth 3 (Three) Times a Day As Needed for Muscle Spasms. (Patient not taking: Reported on 3/13/2025) 90 tablet 1    azithromycin (Zithromax Z-Neeraj) 250 MG tablet Take 2 tablets the first day, then 1 tablet daily for 4 days. 6 tablet 0    benzocaine-menthol (CHLORASEPTIC) 6-10 MG lozenge Take 1 each by mouth Every 2 (Two) Hours As Needed for Sore Throat. 18 each 0     No facility-administered medications prior to visit.     No opioid medication identified on active medication list. I have reviewed chart for other potential  high risk medication/s and harmful drug interactions in the elderly.      Aspirin is not on active medication list.  Aspirin use is not indicated based on review of current medical condition/s. Risk of harm outweighs potential benefits.  .    Patient Active Problem List   Diagnosis    Anxiety    Gastroesophageal reflux disease    Menopausal flushing    Essential hypertension    Hyperlipidemia    Hypothyroidism    Abnormal mammogram    Breast implant rupture    Vitamin D deficiency    Tubular adenoma of colon    Sigmoid diverticulosis    Internal hemorrhoids    DDD (degenerative disc disease), lumbar    Spinal stenosis, lumbar region, with neurogenic claudication    Spondylosis of lumbar region without myelopathy or radiculopathy    Chronic bilateral low back pain without sciatica    Personal history of colonic polyps    Nuclear cataract    Sacroiliac joint dysfunction    Cervical radiculopathy     Advance Care Planning Advance Directive is not on file.  ACP discussion was held with the patient during this visit. Patient has an advance directive (not in EMR), copy requested.            Objective   Vitals:    03/13/25 1454   BP: 118/78   Pulse: 75   Temp: 98 °F (36.7 °C)   TempSrc: Oral   SpO2: 96%   Weight:  "79.1 kg (174 lb 6.4 oz)   Height: 165.1 cm (65\")   PainSc: 0-No pain       Estimated body mass index is 29.02 kg/m² as calculated from the following:    Height as of this encounter: 165.1 cm (65\").    Weight as of this encounter: 79.1 kg (174 lb 6.4 oz).    BMI is >= 25 and <30. (Overweight) The following options were offered after discussion;: exercise counseling/recommendations and nutrition counseling/recommendations           Does the patient have evidence of cognitive impairment? No  Lab Results   Component Value Date    CHLPL 171 03/07/2025    TRIG 185 (H) 03/07/2025    HDL 52 03/07/2025    LDL 88 03/07/2025    VLDL 31 03/07/2025    HGBA1C 6.00 (H) 03/07/2025                                                                                               Health  Risk Assessment    Smoking Status:  Social History     Tobacco Use   Smoking Status Never   Smokeless Tobacco Never     Alcohol Consumption:  Social History     Substance and Sexual Activity   Alcohol Use Yes    Comment: OCC. USE       Fall Risk Screen  STEADI Fall Risk Assessment was completed, and patient is at LOW risk for falls.Assessment completed on:3/13/2025    Depression Screening   Little interest or pleasure in doing things? Not at all   Feeling down, depressed, or hopeless? Not at all   PHQ-2 Total Score 0      Health Habits and Functional and Cognitive Screening:      3/13/2025     3:02 PM   Functional & Cognitive Status   Do you have difficulty preparing food and eating? No   Do you have difficulty bathing yourself, getting dressed or grooming yourself? No   Do you have difficulty using the toilet? No   Do you have difficulty moving around from place to place? No   Do you have trouble with steps or getting out of a bed or a chair? No   Current Diet Well Balanced Diet   Dental Exam Up to date   Eye Exam Up to date   Exercise (times per week) 7 times per week   Current Exercises Include Walking   Do you need help using the phone?  No   Are you " deaf or do you have serious difficulty hearing?  No   Do you need help to go to places out of walking distance? No   Do you need help shopping? No   Do you need help preparing meals?  No   Do you need help with housework?  No   Do you need help with laundry? No   Do you need help taking your medications? No   Do you need help managing money? No   Do you ever drive or ride in a car without wearing a seat belt? No   Have you felt unusual stress, anger or loneliness in the last month? Yes   Who do you live with? Spouse   If you need help, do you have trouble finding someone available to you? No   Have you been bothered in the last four weeks by sexual problems? No   Do you have difficulty concentrating, remembering or making decisions? No           Age-appropriate Screening Schedule:  Refer to the list below for future screening recommendations based on patient's age, sex and/or medical conditions. Orders for these recommended tests are listed in the plan section. The patient has been provided with a written plan.    Health Maintenance List  Health Maintenance   Topic Date Due    DXA SCAN  Never done    Pneumococcal Vaccine 50+ (1 of 1 - PCV) Never done    INFLUENZA VACCINE  07/01/2024    COVID-19 Vaccine (1 - 2024-25 season) Never done    ANNUAL WELLNESS VISIT  03/04/2025    BMI FOLLOWUP  03/04/2025    COLORECTAL CANCER SCREENING  03/07/2025    LIPID PANEL  03/07/2026    MAMMOGRAM  04/15/2026    TDAP/TD VACCINES (2 - Td or Tdap) 07/13/2027    HEPATITIS C SCREENING  Completed    PAP SMEAR  Discontinued    ZOSTER VACCINE  Discontinued                                                                                                                                                CMS Preventative Services Quick Reference  Risk Factors Identified During Encounter  None Identified    The above risks/problems have been discussed with the patient.  Pertinent information has been shared with the patient in the After Visit  Summary.  An After Visit Summary and PPPS were made available to the patient.    Follow Up:   Next Medicare Wellness visit to be scheduled in 1 year.     Assessment & Plan  Encounter for screening mammogram for malignant neoplasm of breast         Postmenopausal         Acquired hypothyroidism         Essential hypertension           Hyperlipidemia, unspecified hyperlipidemia type            Medicare annual wellness visit, subsequent              Follow Up:   No follow-ups on file.

## 2025-03-13 NOTE — PROGRESS NOTES
Subjective   Dian Jefferson is a 75 y.o. female.   Fu with FL  History of Present Illness   Pt has been taking cholesterol meds as prescribed.  No difficulties with myalgias.   Pt has been taking BP meds as prescribed without any problems.  No HA  No episodes of orthostasis  Pt has been doing well with thyroid meds.  Taking as perscribed without any complications  She still has chrinic neck pain-  she has seen NS and gotten ablation from pain and cont to have p[ain that is worse than last year    The following portions of the patient's history were reviewed and updated as appropriate: allergies, current medications, past family history, past medical history, past social history, past surgical history, and problem list.  Tries to walk reg  Review of Systems    Objective   Physical Exam  Vitals reviewed.   Constitutional:       Appearance: She is well-developed.   HENT:      Head: Normocephalic and atraumatic.      Right Ear: External ear normal.      Left Ear: External ear normal.   Eyes:      Conjunctiva/sclera: Conjunctivae normal.      Pupils: Pupils are equal, round, and reactive to light.   Neck:      Thyroid: No thyromegaly.      Trachea: No tracheal deviation.   Cardiovascular:      Rate and Rhythm: Normal rate and regular rhythm.      Heart sounds: Normal heart sounds.   Pulmonary:      Effort: Pulmonary effort is normal.      Breath sounds: Normal breath sounds.   Abdominal:      General: Bowel sounds are normal. There is no distension.      Palpations: Abdomen is soft.      Tenderness: There is no abdominal tenderness.   Musculoskeletal:         General: No deformity. Normal range of motion.      Cervical back: Normal range of motion.   Skin:     General: Skin is warm and dry.   Neurological:      Mental Status: She is alert and oriented to person, place, and time.   Psychiatric:         Behavior: Behavior normal.         Thought Content: Thought content normal.         Judgment: Judgment normal.          Vitals:    03/13/25 1454   BP: 118/78   Pulse: 75   Temp: 98 °F (36.7 °C)   SpO2: 96%     Body mass index is 29.02 kg/m².         Assessment & Plan   Diagnoses and all orders for this visit:    1. Medicare annual wellness visit, subsequent (Primary)    2. Encounter for screening mammogram for malignant neoplasm of breast    3. Postmenopausal    4. Acquired hypothyroidism  Assessment & Plan:             5. Essential hypertension  Assessment & Plan:               6. Hyperlipidemia, unspecified hyperlipidemia type  Assessment & Plan:                   HPL ok with zetia  HTN-  ok with lisinopril  she is good  Hypothyroidism-ok with thyroidism hormone  Depression-  ding well with current meds  5.  Chronic neck pain-  cont to see pain and NS  takes occas gabapentin

## 2025-04-14 RX ORDER — EZETIMIBE 10 MG/1
10 TABLET ORAL DAILY
Qty: 90 TABLET | Refills: 1 | Status: SHIPPED | OUTPATIENT
Start: 2025-04-14

## 2025-06-02 ENCOUNTER — HOSPITAL ENCOUNTER (OUTPATIENT)
Dept: BONE DENSITY | Facility: HOSPITAL | Age: 75
Discharge: HOME | End: 2025-06-02
Payer: MEDICARE

## 2025-06-02 ENCOUNTER — HOSPITAL ENCOUNTER (OUTPATIENT)
Dept: MAMMOGRAPHY | Facility: HOSPITAL | Age: 75
Discharge: HOME | End: 2025-06-02
Payer: MEDICARE

## 2025-06-02 DIAGNOSIS — Z12.31 ENCOUNTER FOR SCREENING MAMMOGRAM FOR MALIGNANT NEOPLASM OF BREAST: ICD-10-CM

## 2025-06-02 DIAGNOSIS — Z78.0 POSTMENOPAUSAL: ICD-10-CM

## 2025-06-02 PROCEDURE — 77067 SCR MAMMO BI INCL CAD: CPT

## 2025-06-02 PROCEDURE — 77063 BREAST TOMOSYNTHESIS BI: CPT

## 2025-06-02 PROCEDURE — 77080 DXA BONE DENSITY AXIAL: CPT

## 2025-07-09 DIAGNOSIS — E78.5 HYPERLIPIDEMIA, UNSPECIFIED HYPERLIPIDEMIA TYPE: ICD-10-CM

## 2025-07-09 RX ORDER — ROSUVASTATIN CALCIUM 40 MG/1
40 TABLET, COATED ORAL NIGHTLY
Qty: 90 TABLET | Refills: 1 | Status: SHIPPED | OUTPATIENT
Start: 2025-07-09

## 2025-08-25 RX ORDER — LISINOPRIL 10 MG/1
10 TABLET ORAL DAILY
Qty: 90 TABLET | Refills: 1 | Status: SHIPPED | OUTPATIENT
Start: 2025-08-25

## (undated) DEVICE — NDL SPINE 22G 31/2IN BLK

## (undated) DEVICE — KT ORCA ORCAPOD DISP STRL

## (undated) DEVICE — CANN NASL CO2 TRULINK W/O2 A/

## (undated) DEVICE — PAD GRND CATHAY W/CABL DISP

## (undated) DEVICE — PAD GRND E/S NT200IX RF/GEN W/CABL DISP

## (undated) DEVICE — TOWEL,OR,DSP,ST,BLUE,STD,4/PK,20PK/CS: Brand: MEDLINE

## (undated) DEVICE — EPIDURAL TRAY: Brand: MEDLINE INDUSTRIES, INC.

## (undated) DEVICE — GLV SURG TRIUMPH PF LTX 7.5 STRL

## (undated) DEVICE — Device

## (undated) DEVICE — GLV SURG TRIUMPH PF LTX 7 STRL

## (undated) DEVICE — FLEX ADVANTAGE 1500CC: Brand: FLEX ADVANTAGE

## (undated) DEVICE — GOWN ISOL W/THUMB UNIV BLU BX/15